# Patient Record
Sex: MALE | Race: WHITE | NOT HISPANIC OR LATINO | Employment: OTHER | URBAN - METROPOLITAN AREA
[De-identification: names, ages, dates, MRNs, and addresses within clinical notes are randomized per-mention and may not be internally consistent; named-entity substitution may affect disease eponyms.]

---

## 2023-03-03 LAB
FIO2: 21 %
PATIENT TEMPERATURE: 37 DEGREES C
POCT BASE EXCESS, ARTERIAL: -6.8 MMOL/L (ref -2–3)
POCT HCO3, ARTERIAL: 17.7 MMOL/L (ref 22–26)
POCT OXY HEMOGLOBIN, ARTERIAL: 92.5 % (ref 94–98)
POCT PCO2, ARTERIAL: 32 MMHG (ref 38–42)
POCT PH, ARTERIAL: 7.35 (ref 7.38–7.42)
POCT PO2, ARTERIAL: 73 MMHG (ref 85–95)
POCT SO2, ARTERIAL: 96 % (ref 94–100)

## 2023-04-04 DIAGNOSIS — I10 HYPERTENSION, UNSPECIFIED TYPE: ICD-10-CM

## 2023-04-04 DIAGNOSIS — E03.9 HYPOTHYROIDISM, UNSPECIFIED TYPE: ICD-10-CM

## 2023-04-04 DIAGNOSIS — E78.5 HYPERLIPIDEMIA, UNSPECIFIED HYPERLIPIDEMIA TYPE: ICD-10-CM

## 2023-04-04 DIAGNOSIS — M47.816 OSTEOARTHRITIS OF LUMBAR SPINE, UNSPECIFIED SPINAL OSTEOARTHRITIS COMPLICATION STATUS: ICD-10-CM

## 2023-04-04 RX ORDER — ROSUVASTATIN CALCIUM 40 MG/1
40 TABLET, COATED ORAL DAILY
COMMUNITY
Start: 2018-01-29 | End: 2023-04-06 | Stop reason: SINTOL

## 2023-04-04 RX ORDER — CELECOXIB 200 MG/1
200 CAPSULE ORAL DAILY
COMMUNITY
Start: 2022-06-07 | End: 2023-04-04 | Stop reason: SDUPTHER

## 2023-04-04 RX ORDER — LEVOTHYROXINE SODIUM 100 UG/1
100 TABLET ORAL DAILY
Qty: 30 TABLET | Refills: 0 | Status: SHIPPED | OUTPATIENT
Start: 2023-04-04 | End: 2023-04-06 | Stop reason: ALTCHOICE

## 2023-04-04 RX ORDER — LEVOTHYROXINE SODIUM 100 UG/1
100 TABLET ORAL DAILY
COMMUNITY
Start: 2014-12-09 | End: 2023-04-04 | Stop reason: SDUPTHER

## 2023-04-04 RX ORDER — PRAVASTATIN SODIUM 40 MG/1
40 TABLET ORAL DAILY
COMMUNITY
Start: 2023-01-12 | End: 2023-04-04 | Stop reason: SDUPTHER

## 2023-04-04 RX ORDER — OLMESARTAN MEDOXOMIL 20 MG/1
20 TABLET ORAL DAILY
COMMUNITY
Start: 2021-12-16 | End: 2023-04-04 | Stop reason: SDUPTHER

## 2023-04-04 RX ORDER — CELECOXIB 200 MG/1
200 CAPSULE ORAL 2 TIMES DAILY
Qty: 60 CAPSULE | Refills: 0 | Status: SHIPPED | OUTPATIENT
Start: 2023-04-04 | End: 2023-04-06 | Stop reason: SDUPTHER

## 2023-04-04 RX ORDER — LISINOPRIL 20 MG/1
20 TABLET ORAL
COMMUNITY
End: 2023-04-06 | Stop reason: ALTCHOICE

## 2023-04-04 RX ORDER — LISINOPRIL 20 MG/1
20 TABLET ORAL DAILY
Qty: 30 TABLET | Refills: 0 | Status: CANCELLED | OUTPATIENT
Start: 2023-04-04 | End: 2023-05-04

## 2023-04-04 RX ORDER — TAMSULOSIN HYDROCHLORIDE 0.4 MG/1
0.4 CAPSULE ORAL DAILY
COMMUNITY
Start: 2021-04-07 | End: 2023-04-06 | Stop reason: ALTCHOICE

## 2023-04-04 RX ORDER — PRAVASTATIN SODIUM 40 MG/1
40 TABLET ORAL DAILY
Qty: 30 TABLET | Refills: 0 | Status: SHIPPED | OUTPATIENT
Start: 2023-04-04 | End: 2023-04-06 | Stop reason: SINTOL

## 2023-04-04 RX ORDER — OLMESARTAN MEDOXOMIL 20 MG/1
20 TABLET ORAL DAILY
Qty: 30 TABLET | Refills: 0 | Status: SHIPPED | OUTPATIENT
Start: 2023-04-04 | End: 2023-04-06 | Stop reason: SDUPTHER

## 2023-04-06 ENCOUNTER — LAB (OUTPATIENT)
Dept: LAB | Facility: LAB | Age: 73
End: 2023-04-06
Payer: MEDICARE

## 2023-04-06 ENCOUNTER — OFFICE VISIT (OUTPATIENT)
Dept: PRIMARY CARE | Facility: CLINIC | Age: 73
End: 2023-04-06
Payer: MEDICARE

## 2023-04-06 VITALS
DIASTOLIC BLOOD PRESSURE: 70 MMHG | WEIGHT: 236 LBS | HEART RATE: 70 BPM | SYSTOLIC BLOOD PRESSURE: 122 MMHG | BODY MASS INDEX: 27.27 KG/M2

## 2023-04-06 DIAGNOSIS — Z00.00 MEDICARE ANNUAL WELLNESS VISIT, SUBSEQUENT: Primary | ICD-10-CM

## 2023-04-06 DIAGNOSIS — R91.1 PULMONARY NODULE: ICD-10-CM

## 2023-04-06 DIAGNOSIS — I10 PRIMARY HYPERTENSION: ICD-10-CM

## 2023-04-06 DIAGNOSIS — E03.9 ACQUIRED HYPOTHYROIDISM: ICD-10-CM

## 2023-04-06 DIAGNOSIS — M15.9 GENERALIZED OSTEOARTHRITIS OF MULTIPLE SITES: ICD-10-CM

## 2023-04-06 DIAGNOSIS — J44.9 CHRONIC OBSTRUCTIVE PULMONARY DISEASE, UNSPECIFIED COPD TYPE (MULTI): ICD-10-CM

## 2023-04-06 DIAGNOSIS — C61 PROSTATE CANCER (MULTI): ICD-10-CM

## 2023-04-06 DIAGNOSIS — M47.816 SPONDYLOSIS OF LUMBAR REGION WITHOUT MYELOPATHY OR RADICULOPATHY: ICD-10-CM

## 2023-04-06 DIAGNOSIS — N18.31 STAGE 3A CHRONIC KIDNEY DISEASE (MULTI): ICD-10-CM

## 2023-04-06 DIAGNOSIS — E78.2 MIXED HYPERLIPIDEMIA: ICD-10-CM

## 2023-04-06 DIAGNOSIS — Z00.00 PHYSICAL EXAM, ANNUAL: ICD-10-CM

## 2023-04-06 PROBLEM — R39.12 WEAK URINARY STREAM: Status: ACTIVE | Noted: 2023-04-06

## 2023-04-06 PROBLEM — R09.02 HYPOXIA: Status: RESOLVED | Noted: 2023-04-06 | Resolved: 2023-04-06

## 2023-04-06 PROBLEM — I65.29 CAROTID ARTERY OCCLUSION: Status: ACTIVE | Noted: 2023-04-06

## 2023-04-06 PROBLEM — M25.642 STIFFNESS OF LEFT HAND JOINT: Status: ACTIVE | Noted: 2023-04-06

## 2023-04-06 PROBLEM — M79.10 MYALGIA: Status: ACTIVE | Noted: 2023-04-06

## 2023-04-06 PROBLEM — R97.20 INCREASED PROSTATE SPECIFIC ANTIGEN (PSA) VELOCITY: Status: RESOLVED | Noted: 2023-04-06 | Resolved: 2023-04-06

## 2023-04-06 PROBLEM — R06.02 SOB (SHORTNESS OF BREATH) ON EXERTION: Status: ACTIVE | Noted: 2023-04-06

## 2023-04-06 PROBLEM — M25.642 STIFFNESS OF LEFT HAND JOINT: Status: RESOLVED | Noted: 2023-04-06 | Resolved: 2023-04-06

## 2023-04-06 PROBLEM — R76.8 ANTI-RNP ANTIBODIES PRESENT: Status: ACTIVE | Noted: 2023-04-06

## 2023-04-06 PROBLEM — M25.641 STIFFNESS OF RIGHT HAND JOINT: Status: ACTIVE | Noted: 2023-04-06

## 2023-04-06 PROBLEM — R09.02 HYPOXIA: Status: ACTIVE | Noted: 2023-04-06

## 2023-04-06 PROBLEM — R76.8 SMITH ANTIBODY POSITIVE: Status: ACTIVE | Noted: 2023-04-06

## 2023-04-06 PROBLEM — I65.23 BILATERAL CAROTID ARTERY STENOSIS: Status: ACTIVE | Noted: 2023-04-06

## 2023-04-06 PROBLEM — K42.9 UMBILICAL HERNIA WITHOUT OBSTRUCTION AND WITHOUT GANGRENE: Status: ACTIVE | Noted: 2023-04-06

## 2023-04-06 PROBLEM — M79.10 MYALGIA: Status: RESOLVED | Noted: 2023-04-06 | Resolved: 2023-04-06

## 2023-04-06 PROBLEM — R79.89 AZOTEMIA: Status: ACTIVE | Noted: 2023-04-06

## 2023-04-06 PROBLEM — R97.20 INCREASED PROSTATE SPECIFIC ANTIGEN (PSA) VELOCITY: Status: ACTIVE | Noted: 2023-04-06

## 2023-04-06 PROBLEM — T46.4X5A ACE-INHIBITOR COUGH: Status: ACTIVE | Noted: 2023-04-06

## 2023-04-06 PROBLEM — R13.19 INTERMITTENT DYSPHAGIA: Status: ACTIVE | Noted: 2023-04-06

## 2023-04-06 PROBLEM — M25.641 STIFFNESS OF RIGHT HAND JOINT: Status: RESOLVED | Noted: 2023-04-06 | Resolved: 2023-04-06

## 2023-04-06 PROBLEM — R05.8 ACE-INHIBITOR COUGH: Status: ACTIVE | Noted: 2023-04-06

## 2023-04-06 PROBLEM — R06.02 SOB (SHORTNESS OF BREATH) ON EXERTION: Status: RESOLVED | Noted: 2023-04-06 | Resolved: 2023-04-06

## 2023-04-06 PROBLEM — R39.12 WEAK URINARY STREAM: Status: RESOLVED | Noted: 2023-04-06 | Resolved: 2023-04-06

## 2023-04-06 PROBLEM — R79.89 AZOTEMIA: Status: RESOLVED | Noted: 2023-04-06 | Resolved: 2023-04-06

## 2023-04-06 PROBLEM — E78.5 HYPERLIPIDEMIA: Status: ACTIVE | Noted: 2023-04-06

## 2023-04-06 LAB — THYROTROPIN (MIU/L) IN SER/PLAS BY DETECTION LIMIT <= 0.05 MIU/L: 0.6 MIU/L (ref 0.44–3.98)

## 2023-04-06 PROCEDURE — 84443 ASSAY THYROID STIM HORMONE: CPT

## 2023-04-06 PROCEDURE — 1160F RVW MEDS BY RX/DR IN RCRD: CPT | Performed by: FAMILY MEDICINE

## 2023-04-06 PROCEDURE — 99397 PER PM REEVAL EST PAT 65+ YR: CPT | Performed by: FAMILY MEDICINE

## 2023-04-06 PROCEDURE — 99214 OFFICE O/P EST MOD 30 MIN: CPT | Performed by: FAMILY MEDICINE

## 2023-04-06 PROCEDURE — 1157F ADVNC CARE PLAN IN RCRD: CPT | Performed by: FAMILY MEDICINE

## 2023-04-06 PROCEDURE — 1036F TOBACCO NON-USER: CPT | Performed by: FAMILY MEDICINE

## 2023-04-06 PROCEDURE — G0439 PPPS, SUBSEQ VISIT: HCPCS | Performed by: FAMILY MEDICINE

## 2023-04-06 PROCEDURE — 3074F SYST BP LT 130 MM HG: CPT | Performed by: FAMILY MEDICINE

## 2023-04-06 PROCEDURE — 1159F MED LIST DOCD IN RCRD: CPT | Performed by: FAMILY MEDICINE

## 2023-04-06 PROCEDURE — 1170F FXNL STATUS ASSESSED: CPT | Performed by: FAMILY MEDICINE

## 2023-04-06 PROCEDURE — 36415 COLL VENOUS BLD VENIPUNCTURE: CPT

## 2023-04-06 PROCEDURE — 3078F DIAST BP <80 MM HG: CPT | Performed by: FAMILY MEDICINE

## 2023-04-06 RX ORDER — CELECOXIB 200 MG/1
200 CAPSULE ORAL 2 TIMES DAILY
Qty: 180 CAPSULE | Refills: 1 | Status: SHIPPED | OUTPATIENT
Start: 2023-04-06 | End: 2023-09-08 | Stop reason: SDUPTHER

## 2023-04-06 RX ORDER — OLMESARTAN MEDOXOMIL 20 MG/1
20 TABLET ORAL DAILY
Qty: 90 TABLET | Refills: 1 | Status: SHIPPED | OUTPATIENT
Start: 2023-04-06 | End: 2023-09-08 | Stop reason: SDUPTHER

## 2023-04-06 RX ORDER — EZETIMIBE 10 MG/1
10 TABLET ORAL DAILY
Qty: 90 TABLET | Refills: 1 | Status: SHIPPED | OUTPATIENT
Start: 2023-04-06 | End: 2024-01-09 | Stop reason: ALTCHOICE

## 2023-04-06 ASSESSMENT — PATIENT HEALTH QUESTIONNAIRE - PHQ9
1. LITTLE INTEREST OR PLEASURE IN DOING THINGS: NOT AT ALL
SUM OF ALL RESPONSES TO PHQ9 QUESTIONS 1 AND 2: 0
2. FEELING DOWN, DEPRESSED OR HOPELESS: NOT AT ALL
1. LITTLE INTEREST OR PLEASURE IN DOING THINGS: NOT AT ALL
SUM OF ALL RESPONSES TO PHQ9 QUESTIONS 1 AND 2: 0
2. FEELING DOWN, DEPRESSED OR HOPELESS: NOT AT ALL

## 2023-04-06 ASSESSMENT — ACTIVITIES OF DAILY LIVING (ADL)
TAKING_MEDICATION: INDEPENDENT
MANAGING_FINANCES: INDEPENDENT
DRESSING: INDEPENDENT
DOING_HOUSEWORK: INDEPENDENT
GROCERY_SHOPPING: INDEPENDENT
BATHING: INDEPENDENT

## 2023-04-06 ASSESSMENT — ENCOUNTER SYMPTOMS
LOSS OF SENSATION IN FEET: 0
OCCASIONAL FEELINGS OF UNSTEADINESS: 0
DEPRESSION: 0

## 2023-04-06 NOTE — ASSESSMENT & PLAN NOTE
Intermediate risk prostate cancer, cT1c cN0 cMx, PSA 3.38 ng/mL, [place] grade group 2 (3+4 = 7), stage IIB  Now almost 2 years status post I-125 brachytherapy seeds  You have follow-up with your oncology forthcoming, please keep that appointment at the end of May  Last PSA check was in November 2022, noted to be 0.75 and there appeared to be a nice decline in the data points since having radiation seeds

## 2023-04-06 NOTE — ASSESSMENT & PLAN NOTE
Last lipid panel was noted to be very nice back in December  163, 42, 90, 152, currently taking pravastatin  We are going to discontinue pravastatin in favor of Zetia 10 mg daily  As we discussed, there is no long-term data for risk reduction with Zetia, but we need to do something to keep your data points as low as possible, especially since you have not been tolerable to the statin family medications  Talked about Repatha, but we both felt it may be cost prohibitive for you

## 2023-04-06 NOTE — ASSESSMENT & PLAN NOTE
CT chest reviewed, ordered by cardiology, reviewed also with pulmonology  Some pulmonary nodules noted along with lymphadenopathy in the hilar and mediastinum area indicative of possible reactive nodes, or even neoplasia, consider sarcoid  Repeat CT chest recommended by radiologist, it looks as though it has been arranged for August 2023 through pulmonology

## 2023-04-06 NOTE — ASSESSMENT & PLAN NOTE
GFR last checked December 2022, noted to be 44 previous 48, 49, 55, 59  It appears as though this has remained stable for quite some time  We will continue to monitor routinely

## 2023-04-06 NOTE — ASSESSMENT & PLAN NOTE
Stable, no changes to medication recommended  I would like to have you monitor and record blood pressures at home   Blood pressure goal should be below 130/80, ideally 120/80  If the blood pressure is too high or too low, we need to consider making adjustments to your antihypertensive therapy

## 2023-04-06 NOTE — PROGRESS NOTES
Subjective   Patient ID: Sae Dan is a 72 y.o. male who presents for Hyperlipidemia, Hypertension, and Hypothyroidism.    HPI  1. Health Maintenance  Overall patient is doing well.   Immunization: Up-to-date except Shingrix  Colon Cancer Screenin, no repeat necessary  Diet:  Exercise:  Tobacco: Former smoker with secondhand smoke exposure  EtOH: Rarely Socially      2. Hypertension   Currently on lisinopril 20 mg and olmesartan 20 mg daily. Tolerating well.   BP today in triage showed ##. Recheck showed ##.   Home blood pressure readings showed systolic around ## and diastolic around ##.   Denies any headaches, dizziness, vision changes, chest pain or pressure, palpitations, SOB, VILLATORO, LE edema, or any other complaints.     3. Hyperlipidemia  Currently on pravastatin 40 mg and ##. Tolerating well. Denies any myalgias.  Previous lipid panel on 2022 showed triglycerides elevated at 152. All others are within normal limits.    4. Hypothyroidism  Currently on levothyroxine 100 mcg daily.  Denies any palpitations, weight changes, confusion, diarrhea/constipation, heat or cold intolerance.    5.. Generalized              Review of Systems  All pertinent positive symptoms are included in the history of present illness.    All other systems have been reviewed and are negative and noncontributory to this patient's current ailments.    No Known Allergies    Immunization History   Administered Date(s) Administered    Influenza, seasonal, injectable 2022    Pfizer Purple Cap SARS-CoV-2 2021, 2021    Pneumococcal, Unspecified 2016, 2018       Objective   Vitals:    23 1303   BP: 122/70   Pulse: 70   Weight: 107 kg (236 lb)     Body mass index is 27.27 kg/m².    Physical Exam  CONSTITUTIONAL - well nourished, well developed, looks like stated age, in no acute distress, not ill-appearing, and not tired appearing  SKIN - normal skin color and pigmentation, normal skin turgor  without rash, lesions, or nodules visualized  HEAD - no trauma, normocephalic  EYES - pupils are equal and reactive to light, extraocular muscles are intact, and normal external exam  ENT - TM's intact, no injection, no signs of infection, uvula midline, normal tongue movement and throat normal, no exudate, nasal passage without discharge and patent  NECK - supple without rigidity, no neck mass was observed, no thyromegaly or thyroid nodules  CHEST - clear to auscultation, no wheezing, no crackles and no rales, good effort  CARDIAC - regular rate and regular rhythm, no skipped beats, no murmur  ABDOMEN - no organomegaly, soft, nontender, nondistended, normal bowel sounds, no guarding/rebound/rigidity, negative McBurney sign and negative Saez sign  RECTAL - prostate is smooth, not enlarged, nontender, no masses or nodules palpable; normal sphincter tone, no rectal masses  EXTREMITIES - no edema, no deformities  NEUROLOGICAL - normal gait, normal balance, normal motor, no ataxia, DTRs equal and symmetrical; alert, oriented and no focal signs  PSYCHIATRIC - alert, pleasant and cordial, age-appropriate  IMMUNOLOGIC - no cervical lymphadenopathy     Assessment/Plan   Problem List Items Addressed This Visit       Hyperlipidemia     Last lipid panel was noted to be very nice back in December  163, 42, 90, 152, currently taking pravastatin         Hypertension - Primary     Stable, no changes to medication recommended  I would like to have you monitor and record blood pressures at home   Blood pressure goal should be below 130/80, ideally 120/80  If the blood pressure is too high or too low, we need to consider making adjustments to your antihypertensive therapy         Hypothyroidism     TSH was 3.47 back in December, you work asymptomatic at the time so no changes to medication were made  You continue to be asymptomatic         Osteoarthritis of lumbar spine     Celebrex has been used to help with arthritic related  pain, we will continue to refill as long as it continues to provide benefit         Prostate cancer (CMS/HCC)     Intermediate risk prostate cancer, cT1c cN0 cMx, PSA 3.38 ng/mL, [place] grade group 2 (3+4 = 7), stage IIB  Now almost 2 years status post I-125 brachytherapy seeds  You have follow-up with your oncology forthcoming, please keep that appointment  Last PSA check was in November 2022, noted to be 0.75 and there appeared to be a nice decline in the data points since having radiation seeds         Stage 3a chronic kidney disease     GFR last checked December 2022, noted to be 44 previous 48, 49, 55, 59  It appears as though this has remained stable for quite some time  We will continue to monitor routinely         COPD (chronic obstructive pulmonary disease) (CMS/[facility])     Recent PFT was normal  Please continue to follow with pulmonology per protocol         Generalized osteoarthritis of multiple sites     Celebrex has been used to help with arthritic related pain, we will continue to refill as long as it continues to provide benefit         Pulmonary nodule     CT chest reviewed, ordered by cardiology, reviewed also with pulmonology  Some pulmonary nodules noted along with lymphadenopathy in the hilar and mediastinum area indicative of possible reactive nodes, or even neoplasia, consider sarcoid  Repeat CT chest recommended by radiologist, it looks as though it has been arranged for August 2023 through pulmonology

## 2023-04-06 NOTE — ASSESSMENT & PLAN NOTE
Celebrex has been used to help with arthritic related pain, we will continue to refill as long as it continues to provide benefit

## 2023-04-06 NOTE — ASSESSMENT & PLAN NOTE
TSH was 3.47 back in December, you were asymptomatic at the time so no changes to medication were made  However, now you continue to have problems with weight gain which could be a symptom of your postoperative hypothyroidism

## 2023-04-06 NOTE — PROGRESS NOTES
Subjective   Patient ID: Sae Dan is a 72 y.o. male who presents for Hyperlipidemia, Hypertension, Hypothyroidism, and Medicare Annual Wellness Visit Subsequent.    HPI  1. Medicare annual wellness visit  Overall patient is doing well.   Immunization: Up to date.  COVID-19 vaccine Pfizer x2.  Colon Cancer Screening: No family history; No longer required to continue.  Diet: reportedly well balanced.  Exercise: poor during the winter; planing to restart his gym membership  Tobacco: Denies use  EtOH: Denies use      2. Acquired Hypothyroidism  History of Grave's Disease s/p thyroidectomy   Currently on levothyroxine 112 mcg daily  TSH was 3.47 on December 2022 labs  He reports that prior to the surgery his weight was approximately 185--he is now 236.  Denies any palpitations, confusion, diarrhea/constipation, heat or cold intolerance     3. Primary hypertension   Currently on olmesartan 20 mg daily. Tolerating well.  BP today in triage showed 122/70.   Denies any headaches, dizziness, vision changes, chest pain or pressure, palpitations, SOB, VILLATORO, LE edema, or any other complaints.     4. Mixed hyperlipidemia   Currently on pravastatin 40 mg. Reports significant muscle cramps.  He was previously on rosuvastatin 40 mg, and reported the same sensation.  Previous lipid panel in December 2022 showed triglycerides elevated at 152. All other are within normal limits.  Not willing to continue the medication, would like to have something different.  He has not spoken to his cardiologist about this.    5. Spondylosis/Low back pain/generalized osteoarthritis   History of chronic low back pain  Denies any urinary incontinence, paresthesias, or muscle weakness  Currently on celecoxib 200 mg 1-2 times daily as needed for low back pain  Reports significant relief of pain in his hands, with some relief of his back pain    6. Pulmonary Nodule/COPD  Currently following with his pulmonologist  Reports that he has a follow up CT  scan in 4 months  Recent pulmonary function test was reportedly normal    7. CKD stage 3b  GFR on 12/21/2022 was 44 from 44 six months prior. Appears stable.  Baseline creatinine appears to be 1.5-1.6.  Denies any fatigue, weakness, nausea, vomiting, loss of appetite, or pruritis  Patient reports that he does not want to follow this issue with nephrology at this time.    Review of Systems  All pertinent positive symptoms are included in the history of present illness.    All other systems have been reviewed and are negative and noncontributory to this patient's current ailments.    No Known Allergies    Immunization History   Administered Date(s) Administered    Influenza, seasonal, injectable 12/21/2022    Pfizer Purple Cap SARS-CoV-2 03/08/2021, 04/05/2021    Pneumococcal, Unspecified 12/21/2016, 08/08/2018       Objective   Vitals:    04/06/23 1303   BP: 122/70   Pulse: 70   Weight: 107 kg (236 lb)     Body mass index is 27.27 kg/m².    Physical Exam  CONSTITUTIONAL - well nourished, well developed, looks like stated age, in no acute distress, not ill-appearing, and not tired appearing  SKIN - normal skin color and pigmentation, normal skin turgor without rash, lesions, or nodules visualized  HEAD - no trauma, normocephalic  EYES - extraocular muscles are intact, and normal external exam  NECK - supple without rigidity, no neck mass was observed, no thyromegaly or thyroid nodules  CHEST - clear to auscultation, no wheezing, no crackles and no rales, good effort  CARDIAC - regular rate and regular rhythm, no skipped beats, no murmur  ABDOMEN - no organomegaly, soft, nontender, nondistended, normal bowel sounds, no guarding/rebound/rigidity  EXTREMITIES - no edema, no deformities  NEUROLOGICAL - normal gait, normal balance, normal motor, no ataxia; alert, oriented and no focal signs  PSYCHIATRIC - alert, pleasant and cordial, age-appropriate  IMMUNOLOGIC - no cervical lymphadenopathy     Assessment/Plan   Problem  List Items Addressed This Visit       Hyperlipidemia     Last lipid panel was noted to be very nice back in December  163, 42, 90, 152, currently taking pravastatin  We are going to discontinue pravastatin in favor of Zetia 10 mg daily  As we discussed, there is no long-term data for risk reduction with Zetia, but we need to do something to keep your data points as low as possible, especially since you have not been tolerable to the statin family medications  Talked about Repatha, but we both felt it may be cost prohibitive for you         Hypertension     Stable, no changes to medication recommended  I would like to have you monitor and record blood pressures at home   Blood pressure goal should be below 130/80, ideally 120/80  If the blood pressure is too high or too low, we need to consider making adjustments to your antihypertensive therapy         Hypothyroidism     TSH was 3.47 back in December, you were asymptomatic at the time so no changes to medication were made  However, now you continue to have problems with weight gain which could be a symptom of your postoperative hypothyroidism         Relevant Orders    Tsh With Reflex To Free T4 If Abnormal    Osteoarthritis of lumbar spine     Celebrex has been used to help with arthritic related pain, we will continue to refill as long as it continues to provide benefit         Prostate cancer (CMS/[facility])     Intermediate risk prostate cancer, cT1c cN0 cMx, PSA 3.38 ng/mL, [place] grade group 2 (3+4 = 7), stage IIB  Now almost 2 years status post I-125 brachytherapy seeds  You have follow-up with your oncology forthcoming, please keep that appointment at the end of May  Last PSA check was in November 2022, noted to be 0.75 and there appeared to be a nice decline in the data points since having radiation seeds         Stage 3a chronic kidney disease     GFR last checked December 2022, noted to be 44 previous 48, 49, 55, 59  It appears as though this has remained  stable for quite some time  We will continue to monitor routinely         COPD (chronic obstructive pulmonary disease) (CMS/HCC)     Recent PFT was normal  Please continue to follow with pulmonology per protocol         Generalized osteoarthritis of multiple sites     Celebrex has been used to help with arthritic related pain, we will continue to refill as long as it continues to provide benefit         Pulmonary nodule     CT chest reviewed, ordered by cardiology, reviewed also with pulmonology  Some pulmonary nodules noted along with lymphadenopathy in the hilar and mediastinum area indicative of possible reactive nodes, or even neoplasia, consider sarcoid  Repeat CT chest recommended by radiologist, it looks as though it has been arranged for August 2023 through pulmonology         Medicare annual wellness visit, subsequent - Primary     Colonoscopy no longer needed  Immunizations up-to-date

## 2023-04-07 DIAGNOSIS — E03.9 HYPOTHYROIDISM, UNSPECIFIED TYPE: ICD-10-CM

## 2023-04-07 RX ORDER — LEVOTHYROXINE SODIUM 112 UG/1
112 TABLET ORAL DAILY
Qty: 30 TABLET | Refills: 0 | OUTPATIENT
Start: 2023-04-07 | End: 2023-05-07

## 2023-04-09 DIAGNOSIS — E03.9 ACQUIRED HYPOTHYROIDISM: Primary | ICD-10-CM

## 2023-04-09 RX ORDER — LEVOTHYROXINE SODIUM 112 UG/1
112 TABLET ORAL DAILY
Qty: 90 TABLET | Refills: 1 | Status: SHIPPED | OUTPATIENT
Start: 2023-04-09 | End: 2023-09-08 | Stop reason: SDUPTHER

## 2023-04-09 NOTE — RESULT ENCOUNTER NOTE
Thyroid looks excellent with a TSH of 0.60 which is well controlled so no changes to medication are recommended.  This is clearly not the reason why there has been some weight gain.  I will refill levothyroxine 112 mcg daily to the pharmacy on file

## 2023-05-15 DIAGNOSIS — E03.9 ACQUIRED HYPOTHYROIDISM: ICD-10-CM

## 2023-05-15 RX ORDER — LEVOTHYROXINE SODIUM 112 UG/1
112 TABLET ORAL DAILY
Qty: 30 TABLET | Refills: 0 | OUTPATIENT
Start: 2023-05-15 | End: 2023-11-11

## 2023-05-18 DIAGNOSIS — E78.2 MIXED HYPERLIPIDEMIA: Primary | ICD-10-CM

## 2023-05-20 RX ORDER — PRAVASTATIN SODIUM 40 MG/1
TABLET ORAL
Qty: 90 TABLET | Refills: 0 | Status: SHIPPED | OUTPATIENT
Start: 2023-05-20 | End: 2023-09-07 | Stop reason: ALTCHOICE

## 2023-09-07 ENCOUNTER — LAB (OUTPATIENT)
Dept: LAB | Facility: LAB | Age: 73
End: 2023-09-07
Payer: MEDICARE

## 2023-09-07 ENCOUNTER — OFFICE VISIT (OUTPATIENT)
Dept: PRIMARY CARE | Facility: CLINIC | Age: 73
End: 2023-09-07
Payer: MEDICARE

## 2023-09-07 VITALS
SYSTOLIC BLOOD PRESSURE: 150 MMHG | WEIGHT: 227 LBS | HEART RATE: 70 BPM | BODY MASS INDEX: 33.52 KG/M2 | DIASTOLIC BLOOD PRESSURE: 90 MMHG

## 2023-09-07 DIAGNOSIS — N18.31 STAGE 3A CHRONIC KIDNEY DISEASE (MULTI): ICD-10-CM

## 2023-09-07 DIAGNOSIS — D72.829 LEUKOCYTOSIS, UNSPECIFIED TYPE: ICD-10-CM

## 2023-09-07 DIAGNOSIS — E78.2 MIXED HYPERLIPIDEMIA: ICD-10-CM

## 2023-09-07 DIAGNOSIS — I10 PRIMARY HYPERTENSION: Primary | ICD-10-CM

## 2023-09-07 DIAGNOSIS — E89.0 POSTOPERATIVE HYPOTHYROIDISM: ICD-10-CM

## 2023-09-07 DIAGNOSIS — I10 PRIMARY HYPERTENSION: ICD-10-CM

## 2023-09-07 DIAGNOSIS — M47.816 SPONDYLOSIS OF LUMBAR REGION WITHOUT MYELOPATHY OR RADICULOPATHY: ICD-10-CM

## 2023-09-07 DIAGNOSIS — E03.9 ACQUIRED HYPOTHYROIDISM: ICD-10-CM

## 2023-09-07 DIAGNOSIS — Z90.49 HISTORY OF APPENDECTOMY: ICD-10-CM

## 2023-09-07 PROBLEM — R94.31 ABNORMAL ELECTROCARDIOGRAM (ECG) (EKG): Status: ACTIVE | Noted: 2023-09-07

## 2023-09-07 PROBLEM — I63.9 CEREBRAL INFARCTION, UNSPECIFIED (MULTI): Status: ACTIVE | Noted: 2023-09-07

## 2023-09-07 LAB
ALANINE AMINOTRANSFERASE (SGPT) (U/L) IN SER/PLAS: 18 U/L (ref 10–52)
ALBUMIN (G/DL) IN SER/PLAS: 4 G/DL (ref 3.4–5)
ALKALINE PHOSPHATASE (U/L) IN SER/PLAS: 76 U/L (ref 33–136)
ANION GAP IN SER/PLAS: 13 MMOL/L (ref 10–20)
ASPARTATE AMINOTRANSFERASE (SGOT) (U/L) IN SER/PLAS: 18 U/L (ref 9–39)
BASOPHILS (10*3/UL) IN BLOOD BY AUTOMATED COUNT: 0.06 X10E9/L (ref 0–0.1)
BASOPHILS/100 LEUKOCYTES IN BLOOD BY AUTOMATED COUNT: 1 % (ref 0–2)
BILIRUBIN TOTAL (MG/DL) IN SER/PLAS: 0.5 MG/DL (ref 0–1.2)
CALCIUM (MG/DL) IN SER/PLAS: 8.9 MG/DL (ref 8.6–10.3)
CARBON DIOXIDE, TOTAL (MMOL/L) IN SER/PLAS: 23 MMOL/L (ref 21–32)
CHLORIDE (MMOL/L) IN SER/PLAS: 108 MMOL/L (ref 98–107)
CHOLESTEROL (MG/DL) IN SER/PLAS: 234 MG/DL (ref 0–199)
CHOLESTEROL IN HDL (MG/DL) IN SER/PLAS: 41.1 MG/DL
CHOLESTEROL/HDL RATIO: 5.7
CREATININE (MG/DL) IN SER/PLAS: 1.24 MG/DL (ref 0.5–1.3)
EOSINOPHILS (10*3/UL) IN BLOOD BY AUTOMATED COUNT: 0.2 X10E9/L (ref 0–0.4)
EOSINOPHILS/100 LEUKOCYTES IN BLOOD BY AUTOMATED COUNT: 3.4 % (ref 0–6)
ERYTHROCYTE DISTRIBUTION WIDTH (RATIO) BY AUTOMATED COUNT: 14 % (ref 11.5–14.5)
ERYTHROCYTE MEAN CORPUSCULAR HEMOGLOBIN CONCENTRATION (G/DL) BY AUTOMATED: 31.8 G/DL (ref 32–36)
ERYTHROCYTE MEAN CORPUSCULAR VOLUME (FL) BY AUTOMATED COUNT: 92 FL (ref 80–100)
ERYTHROCYTES (10*6/UL) IN BLOOD BY AUTOMATED COUNT: 4.35 X10E12/L (ref 4.5–5.9)
GFR MALE: 61 ML/MIN/1.73M2
GLUCOSE (MG/DL) IN SER/PLAS: 94 MG/DL (ref 74–99)
HEMATOCRIT (%) IN BLOOD BY AUTOMATED COUNT: 40 % (ref 41–52)
HEMOGLOBIN (G/DL) IN BLOOD: 12.7 G/DL (ref 13.5–17.5)
IMMATURE GRANULOCYTES/100 LEUKOCYTES IN BLOOD BY AUTOMATED COUNT: 0.2 % (ref 0–0.9)
LDL: 162 MG/DL (ref 0–99)
LEUKOCYTES (10*3/UL) IN BLOOD BY AUTOMATED COUNT: 6 X10E9/L (ref 4.4–11.3)
LYMPHOCYTES (10*3/UL) IN BLOOD BY AUTOMATED COUNT: 1.43 X10E9/L (ref 0.8–3)
LYMPHOCYTES/100 LEUKOCYTES IN BLOOD BY AUTOMATED COUNT: 24 % (ref 13–44)
MONOCYTES (10*3/UL) IN BLOOD BY AUTOMATED COUNT: 0.71 X10E9/L (ref 0.05–0.8)
MONOCYTES/100 LEUKOCYTES IN BLOOD BY AUTOMATED COUNT: 11.9 % (ref 2–10)
NEUTROPHILS (10*3/UL) IN BLOOD BY AUTOMATED COUNT: 3.55 X10E9/L (ref 1.6–5.5)
NEUTROPHILS/100 LEUKOCYTES IN BLOOD BY AUTOMATED COUNT: 59.5 % (ref 40–80)
PLATELETS (10*3/UL) IN BLOOD AUTOMATED COUNT: 131 X10E9/L (ref 150–450)
POTASSIUM (MMOL/L) IN SER/PLAS: 4.5 MMOL/L (ref 3.5–5.3)
PROTEIN TOTAL: 7.2 G/DL (ref 6.4–8.2)
SODIUM (MMOL/L) IN SER/PLAS: 139 MMOL/L (ref 136–145)
THYROTROPIN (MIU/L) IN SER/PLAS BY DETECTION LIMIT <= 0.05 MIU/L: 1.15 MIU/L (ref 0.44–3.98)
TRIGLYCERIDE (MG/DL) IN SER/PLAS: 155 MG/DL (ref 0–149)
UREA NITROGEN (MG/DL) IN SER/PLAS: 22 MG/DL (ref 6–23)
VLDL: 31 MG/DL (ref 0–40)

## 2023-09-07 PROCEDURE — 3080F DIAST BP >= 90 MM HG: CPT | Performed by: FAMILY MEDICINE

## 2023-09-07 PROCEDURE — 84443 ASSAY THYROID STIM HORMONE: CPT

## 2023-09-07 PROCEDURE — 80053 COMPREHEN METABOLIC PANEL: CPT

## 2023-09-07 PROCEDURE — 36415 COLL VENOUS BLD VENIPUNCTURE: CPT

## 2023-09-07 PROCEDURE — 1036F TOBACCO NON-USER: CPT | Performed by: FAMILY MEDICINE

## 2023-09-07 PROCEDURE — 80061 LIPID PANEL: CPT

## 2023-09-07 PROCEDURE — 99214 OFFICE O/P EST MOD 30 MIN: CPT | Performed by: FAMILY MEDICINE

## 2023-09-07 PROCEDURE — 1157F ADVNC CARE PLAN IN RCRD: CPT | Performed by: FAMILY MEDICINE

## 2023-09-07 PROCEDURE — 85025 COMPLETE CBC W/AUTO DIFF WBC: CPT

## 2023-09-07 PROCEDURE — 3077F SYST BP >= 140 MM HG: CPT | Performed by: FAMILY MEDICINE

## 2023-09-07 PROCEDURE — 1160F RVW MEDS BY RX/DR IN RCRD: CPT | Performed by: FAMILY MEDICINE

## 2023-09-07 PROCEDURE — 1126F AMNT PAIN NOTED NONE PRSNT: CPT | Performed by: FAMILY MEDICINE

## 2023-09-07 PROCEDURE — 1159F MED LIST DOCD IN RCRD: CPT | Performed by: FAMILY MEDICINE

## 2023-09-07 PROCEDURE — 3008F BODY MASS INDEX DOCD: CPT | Performed by: FAMILY MEDICINE

## 2023-09-07 ASSESSMENT — PATIENT HEALTH QUESTIONNAIRE - PHQ9
1. LITTLE INTEREST OR PLEASURE IN DOING THINGS: NOT AT ALL
SUM OF ALL RESPONSES TO PHQ9 QUESTIONS 1 AND 2: 0
2. FEELING DOWN, DEPRESSED OR HOPELESS: NOT AT ALL

## 2023-09-07 NOTE — ASSESSMENT & PLAN NOTE
Continue on Zetia given your previous side effects with statins  As we discussed, there is no long-term data for risk reduction with Zetia, but we need to do something to keep your data points as low as possible, especially since you have not been tolerable to the statin family medications

## 2023-09-07 NOTE — PROGRESS NOTES
Subjective   Patient ID: Sae Dan is a 72 y.o. male who presents for Hyperlipidemia, Hypertension, and Hypothyroidism.    HPI  1.  Status post appendectomy/hospital discharge.  Went to the ED on August 4 for RLQ pain  Turns out appendicitis was diagnosed, so underwent appendectomy  Hospitalized until August 5  Pathology revealed acute appendicitis with perforation and acute serositis  No residual concerns or complaints, feeling fantastic today    2.  Hypothyroidism.  History of Grave's Disease s/p thyroidectomy   Currently on levothyroxine 112 mcg daily  TSH was 0.60 on April 2023 labs  He reports that prior to the surgery his weight was approximately 185--he is now 227.  Notes that after his appendectomy, he lost 12 pounds, tried to keep weight off but is gaining some back  Has joined a gym and is hoping to remain committed to weight loss journey  Denies any palpitations, confusion, diarrhea/constipation, heat or cold intolerance      3.  Hypertension.  Currently on olmesartan 20 mg daily. Tolerating well.  BP today in triage showed 150/90, was 142/81 on retake  States he forgot his medication this morning  Denies any headaches, dizziness, vision changes, chest pain or pressure, palpitations, SOB, VILLATORO, LE edema, or any other complaints.      4.  Hyperlipidemia.  Currently on Zetia 10 mg  He was previously on pravastatin 40 mg, and reported muscle cramping  Previous lipid panel in December 2022 showed triglycerides elevated at 152. All other are within normal limits.     5.  Spondylosis/Low back pain/generalized osteoarthritis   History of chronic low back pain  Denies any urinary incontinence, paresthesias, or muscle weakness  Currently on celecoxib 200 mg 1-2 times daily as needed for low back pain  Reports significant relief of pain in his hands, with some relief of his back pain     Review of Systems  All pertinent positive symptoms are included in the history of present illness.    All other systems  have been reviewed and are negative and noncontributory to this patient's current ailments.    Allergies   Allergen Reactions    Morphine Rash       Immunization History   Administered Date(s) Administered    Influenza, seasonal, injectable 12/21/2022    Pfizer Purple Cap SARS-CoV-2 03/08/2021, 04/05/2021    Pneumococcal, Unspecified 12/21/2016, 08/08/2018       Objective   Visit Vitals  /90   Pulse 70   Wt 103 kg (227 lb)   BMI 33.52 kg/m²   Smoking Status Former   BSA 2.24 m²       Physical Exam  CONSTITUTIONAL - well nourished, well developed, looks like stated age, in no acute distress, not ill-appearing, and not tired appearing  SKIN - normal skin color and pigmentation, normal skin turgor without rash, lesions, or nodules visualized  HEAD - no trauma, normocephalic  EYES - normal external exam  CHEST - clear to auscultation, no wheezing, no crackles and no rales, good effort  CARDIAC - regular rate and regular rhythm, no skipped beats, no murmur  ABDOMEN - 3 small incision sites were noted where the appendectomy was done, all well-healed, no signs of infection, there is a small scab on the central lesion just superior to the umbilicus; soft, nontender, no distention  EXTREMITIES - no edema, no deformities  NEUROLOGICAL - normal balance, normal motor, no ataxia  PSYCHIATRIC - alert, pleasant and cordial, age-appropriate     Assessment/Plan   Problem List Items Addressed This Visit       Hyperlipidemia     Continue on Zetia given your previous side effects with statins  As we discussed, there is no long-term data for risk reduction with Zetia, but we need to do something to keep your data points as low as possible, especially since you have not been tolerable to the statin family medications          Relevant Orders    Comprehensive Metabolic Panel    Lipid Panel    Hypertension - Primary     Stable, no changes to medication recommended  I would like to have you monitor and record blood pressures at home    Blood pressure goal should be below 130/80, ideally 120/80  If the blood pressure is too high or too low, we need to consider making adjustments to your antihypertensive therapy         Relevant Orders    Comprehensive Metabolic Panel    Hypothyroidism     Continue on current management plan  We will obtain TSH to ensure euthyroid state         Relevant Orders    TSH with reflex to Free T4 if abnormal    Osteoarthritis of lumbar spine     Celebrex has been used to help with arthritic related pain, we will continue to refill as long as it continues to provide benefit          Stage 3a chronic kidney disease (CMS/HCC)     We will continue to monitor, ordered CMP with blood work today         Relevant Orders    Comprehensive Metabolic Panel    History of appendectomy     I am glad that everything went well with you surgery and you are recovering         Leukocytosis     WBC was elevated at your hospital visit, likely because of your appendicitis  I am going to repeat it with blood work today to ensure it has stabilized         Relevant Orders    CBC and Auto Differential     Current Outpatient Medications   Medication Instructions    celecoxib (CELEBREX) 200 mg, oral, 2 times daily    ezetimibe (ZETIA) 10 mg, oral, Daily    levothyroxine (SYNTHROID, LEVOXYL) 112 mcg, oral, Daily    olmesartan (BENICAR) 20 mg, oral, Daily

## 2023-09-07 NOTE — ASSESSMENT & PLAN NOTE
WBC was elevated at your hospital visit, likely because of your appendicitis  I am going to repeat it with blood work today to ensure it has stabilized

## 2023-09-08 DIAGNOSIS — E03.9 ACQUIRED HYPOTHYROIDISM: ICD-10-CM

## 2023-09-08 DIAGNOSIS — I10 PRIMARY HYPERTENSION: ICD-10-CM

## 2023-09-08 DIAGNOSIS — M15.9 GENERALIZED OSTEOARTHRITIS OF MULTIPLE SITES: ICD-10-CM

## 2023-09-08 DIAGNOSIS — E78.2 MIXED HYPERLIPIDEMIA: ICD-10-CM

## 2023-09-08 RX ORDER — CELECOXIB 200 MG/1
200 CAPSULE ORAL 2 TIMES DAILY
Qty: 180 CAPSULE | Refills: 1 | Status: SHIPPED | OUTPATIENT
Start: 2023-09-08 | End: 2024-03-06

## 2023-09-08 RX ORDER — LEVOTHYROXINE SODIUM 112 UG/1
112 TABLET ORAL DAILY
Qty: 90 TABLET | Refills: 1 | Status: SHIPPED | OUTPATIENT
Start: 2023-09-08 | End: 2024-04-28 | Stop reason: SDUPTHER

## 2023-09-08 RX ORDER — OLMESARTAN MEDOXOMIL 20 MG/1
20 TABLET ORAL DAILY
Qty: 90 TABLET | Refills: 1 | Status: SHIPPED | OUTPATIENT
Start: 2023-09-08 | End: 2024-03-07 | Stop reason: SDUPTHER

## 2023-09-08 NOTE — RESULT ENCOUNTER NOTE
Sugar, kidney, liver, electrolytes, thyroid normal    Cholesterol markedly elevated at 234 with  which is easily the highest you have had on file in at least 3 years.  Does not look like the Zetia that you are using in place of the pravastatin is benefiting you at all.  Pravastatin is the least toxic in regard to side effect profile of all the other statin therapies.  I looked at your past records and you have tried atorvastatin, rosuvastatin, and simvastatin all of which were discontinued because of side effects.  The only other thing I can think about doing is having you seen by a cardiologist who can try to get Repatha approved for you which is an injection every 2 weeks that dramatically lowers the cholesterol and has very minimal side effects.    Complete blood cell count has improved since your hospital admission, still showing some slight anemia, and platelets are down a bit, which I suspect may be residual from your appendectomy.  You may want to consider taking an OTC daily iron 65 mg tablet

## 2023-09-11 RX ORDER — PRAVASTATIN SODIUM 40 MG/1
40 TABLET ORAL DAILY
Qty: 90 TABLET | Refills: 1 | Status: SHIPPED | OUTPATIENT
Start: 2023-09-11 | End: 2024-03-07 | Stop reason: SDUPTHER

## 2023-10-02 ENCOUNTER — APPOINTMENT (OUTPATIENT)
Dept: PULMONOLOGY | Facility: CLINIC | Age: 73
End: 2023-10-02
Payer: MEDICARE

## 2023-10-16 ENCOUNTER — HOSPITAL ENCOUNTER (OUTPATIENT)
Dept: RADIOLOGY | Facility: HOSPITAL | Age: 73
Discharge: HOME | End: 2023-10-16
Payer: MEDICARE

## 2023-10-16 DIAGNOSIS — R91.1 SOLITARY PULMONARY NODULE: ICD-10-CM

## 2023-10-16 DIAGNOSIS — C61 MALIGNANT NEOPLASM OF PROSTATE (MULTI): ICD-10-CM

## 2023-10-16 DIAGNOSIS — R06.02 SHORTNESS OF BREATH: ICD-10-CM

## 2023-10-16 PROCEDURE — 71250 CT THORAX DX C-: CPT

## 2023-10-16 PROCEDURE — 71250 CT THORAX DX C-: CPT | Performed by: RADIOLOGY

## 2023-11-29 ENCOUNTER — LAB (OUTPATIENT)
Dept: LAB | Facility: LAB | Age: 73
End: 2023-11-29
Payer: MEDICARE

## 2023-11-29 ENCOUNTER — TELEPHONE (OUTPATIENT)
Dept: RADIATION ONCOLOGY | Facility: HOSPITAL | Age: 73
End: 2023-11-29

## 2023-11-29 DIAGNOSIS — C61 MALIGNANT NEOPLASM OF PROSTATE (MULTI): Primary | ICD-10-CM

## 2023-11-29 DIAGNOSIS — C61 MALIGNANT NEOPLASM OF PROSTATE (MULTI): ICD-10-CM

## 2023-11-29 LAB — PSA SERPL-MCNC: 0.19 NG/ML

## 2023-11-29 PROCEDURE — 84153 ASSAY OF PSA TOTAL: CPT

## 2023-11-29 PROCEDURE — 36415 COLL VENOUS BLD VENIPUNCTURE: CPT

## 2023-11-30 NOTE — PROGRESS NOTES
Cancer synopsis:  Rad/onc:  Dr. Vázquez/ Mimi HUA    73 year-old male with favorable intermediate risk prostate cancer, cT1c cN0 cMx, PSA 3.38 ng/mL, [place] grade group 2 (3+4 = 7), stage IIB.  The patient has a remote history of Graves' disease treated with retro-orbital radiation 2000 cGy in 10 fractions completed in 2014.  The patient had elevated PSA and MRI of the prostate done shows a 1.2 cm focus in the posterior medial left peripheral zone of the apex consistent with a PI-RADS four lesion with abutment of the capsule, no notable  extraprostatic extension, seminal vesicle invasion or pelvic lymphadenopathy.  The patient underwent transrectal ultrasound-guided biopsy of the prostate  which shows [place] grade group 2 (3+4 = 7) prostate adenocarcinoma with  intraductal carcinoma identified.  Pretreatment MALACHI score 11. IPSS 3.  Quality  of life 1 (pleased).     The radiation planning and treatment procedures were explained to the patient  in advance, and all questions were answered. The benefits and goals of  treatment, options and alternatives, limitations, side effects and risks of  radiation were also explained. The patient provided informed consent.     Technical Summary : Region(s) Treated: Prostate  Radiation Dose Prescribed:  144 Gy, minimal peripheral dose.  Radiation Technique/Machine Used: LDR prostate brachytherapy implant using  I-125. Additional radiation technical details available in our radiation  oncology EMR [employer] [facility] and our treatment planning system.     Treatment Area #1  Location : Prostate  Dates : 5/24/2021  Number of Treatments : 1  Dose : 52728 cGy  Modality : I-125 LDR     History of presenting illness:    Patient ID: [id number]     Sae Dna is a 73 y.o. male who presents for his FIR prostate cancer GS 3+4=7, IPSA <10 now s/p RT.    RT Site: Prostate  RT Date: 05/24/2021 ([facility])  Hormone therapy: No  Hot Flushes: Denies  Fatigue: Denies  Bone pain: Denies  MALACHI: n/a virtual  ED:  Does report erectile loss with aging and RT hwoever not concerned at this time per patient  ED medications: No  IPSS: n/a virtual  Urinary symptoms: Denies hematuria, dysuria. Admits to mild weak stream however resolved as day progresses and is manageable per patient. Denies incomplete bladder emptying, urine leakage or urgency.  Urinary Medications: No  Rectal bleeding: Denies  Other systems: Denies SOB, CP or fever.      Review of systems:  Review of Systems   Constitutional:  Negative for fatigue, fever and unexpected weight change.   Respiratory:  Negative for cough, chest tightness and shortness of breath.    Cardiovascular:  Negative for chest pain, palpitations and leg swelling.   Gastrointestinal:  Negative for abdominal pain, anal bleeding, blood in stool, constipation, diarrhea and rectal pain.   Endocrine: Negative for cold intolerance, heat intolerance and polyuria.   Genitourinary:  Negative for decreased urine volume, difficulty urinating, dysuria, frequency, hematuria and urgency.   Musculoskeletal:  Negative for arthralgias, back pain, gait problem and joint swelling.   Skin: Negative.    Allergic/Immunologic: Negative.    Neurological:  Negative for dizziness, syncope and weakness.   Psychiatric/Behavioral: Negative.         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    Personal history of other diseases of the circulatory system 10/04/2013    History of  arteriosclerotic cardiovascular disease    Personal history of other endocrine, nutritional and metabolic disease     History of Graves' disease    Personal history of other endocrine, nutritional and metabolic disease     History of thyroid disease    Shortness of breath 11/26/2018    Shortness of breath on exertion    Unspecified disorder of refraction 05/15/2014    Refractive error    Unspecified ptosis of left eyelid 05/29/2014    Ptosis of left eyelid    Unspecified ptosis of right eyelid 05/29/2014    Ptosis of right eyelid        Surgical/family history  Family History   Problem Relation Name Age of Onset    Cancer Mother      Cataracts Father      Cancer Father      Glaucoma Other grandmother       Past Surgical History:   Procedure Laterality Date    CT ANGIO HEART CORONARY  11/8/2018    CT HEART CORONARY ANGIOGRAM 11/8/2018 [facility]    MR HEAD ANGIO WO IV CONTRAST  11/17/2012    MR HEAD ANGIO WO IV CONTRAST 11[facility]17/2012 [facility]    MR NECK ANGIO WO IV CONTRAST  11/17/2012    MR NECK ANGIO WO IV CONTRAST 11/17/2012 [facility]    OTHER SURGICAL HISTORY  08/09/2018    Endarterectomy Carotid Artery    TOTAL THYROIDECTOMY  01/09/2015    Thyroid Surgery Total Thyroidectomy        Social History  Tobacco Use: Medium Risk (9/7/2023)    Patient History     Smoking Tobacco Use: Former     Smokeless Tobacco Use: Never     Passive Exposure: Past         Current med list:  Current Outpatient Medications   Medication Instructions    celecoxib (CELEBREX) 200 mg, oral, 2 times daily    ezetimibe (ZETIA) 10 mg, oral, Daily    levothyroxine (SYNTHROID, LEVOXYL) 112 mcg, oral, Daily    olmesartan (BENICAR) 20 mg, oral, Daily    pravastatin (PRAVACHOL) 40 mg, oral, Daily, as directed        Last recorded vital:  N/a virtual    Physical exam  N/a virtual    Pertinent labs:  Prostate Specific AG   Date/Time Value Ref Range Status   11/29/2023 10:55 AM 0.19 <=4.00 ng/mL Final         Dx:  Problem  List Items Addressed This Visit    None  Visit Diagnoses       Malignant neoplasm of prostate (CMS/HCC)    -  Primary    Relevant Orders    Clinic Appointment Request Follow Up; [facility] (virtual); [facility]  [facility] (virtual)         PSA of 0.19 was reviewed and is still declining nicely. Review of latent SE including rectal bleeding, hematuria, urinary strictures, ED where reviewed as well as how to contact office if s/s present. Does report erectile loss with aging and RT however not concerned at this time per patient. Denies latent SE. NCCN guidelines where reviewed and routine FUV of every 3m for first year and every 6m for four years for a total of five years was discussed. Patient verbalized understanding.          PLAN:  FUV 6m virtually  Labs PSA in 6m  Imaging none  FUV other providers: PCP for routine evals        Please contact office with any concerns:  Chon Perez CNP  [phone]

## 2023-12-01 ENCOUNTER — HOSPITAL ENCOUNTER (OUTPATIENT)
Dept: RADIATION ONCOLOGY | Facility: HOSPITAL | Age: 73
Setting detail: RADIATION/ONCOLOGY SERIES
Discharge: HOME | End: 2023-12-01
Payer: MEDICARE

## 2023-12-01 DIAGNOSIS — C61 MALIGNANT NEOPLASM OF PROSTATE (MULTI): Primary | ICD-10-CM

## 2023-12-01 PROCEDURE — 99213 OFFICE O/P EST LOW 20 MIN: CPT

## 2023-12-01 ASSESSMENT — ENCOUNTER SYMPTOMS
HEMATURIA: 0
RECTAL PAIN: 0
WEAKNESS: 0
ALLERGIC/IMMUNOLOGIC NEGATIVE: 1
UNEXPECTED WEIGHT CHANGE: 0
BLOOD IN STOOL: 0
DYSURIA: 0
COUGH: 0
BACK PAIN: 0
FATIGUE: 0
CONSTIPATION: 0
SHORTNESS OF BREATH: 0
JOINT SWELLING: 0
PSYCHIATRIC NEGATIVE: 1
ANAL BLEEDING: 0
DIFFICULTY URINATING: 0
FEVER: 0
PALPITATIONS: 0
CHEST TIGHTNESS: 0
ABDOMINAL PAIN: 0
DIZZINESS: 0
ARTHRALGIAS: 0
FREQUENCY: 0
DIARRHEA: 0

## 2024-01-09 ENCOUNTER — OFFICE VISIT (OUTPATIENT)
Dept: PRIMARY CARE | Facility: CLINIC | Age: 74
End: 2024-01-09
Payer: MEDICARE

## 2024-01-09 VITALS
SYSTOLIC BLOOD PRESSURE: 130 MMHG | WEIGHT: 237 LBS | DIASTOLIC BLOOD PRESSURE: 70 MMHG | BODY MASS INDEX: 35 KG/M2 | HEART RATE: 68 BPM

## 2024-01-09 DIAGNOSIS — K59.04 CHRONIC IDIOPATHIC CONSTIPATION: Primary | ICD-10-CM

## 2024-01-09 DIAGNOSIS — M15.9 GENERALIZED OSTEOARTHRITIS OF MULTIPLE SITES: ICD-10-CM

## 2024-01-09 PROBLEM — K59.00 CONSTIPATION: Status: ACTIVE | Noted: 2024-01-09

## 2024-01-09 PROCEDURE — 1036F TOBACCO NON-USER: CPT | Performed by: FAMILY MEDICINE

## 2024-01-09 PROCEDURE — 1159F MED LIST DOCD IN RCRD: CPT | Performed by: FAMILY MEDICINE

## 2024-01-09 PROCEDURE — 99214 OFFICE O/P EST MOD 30 MIN: CPT | Performed by: FAMILY MEDICINE

## 2024-01-09 PROCEDURE — 1123F ACP DISCUSS/DSCN MKR DOCD: CPT | Performed by: FAMILY MEDICINE

## 2024-01-09 PROCEDURE — 1126F AMNT PAIN NOTED NONE PRSNT: CPT | Performed by: FAMILY MEDICINE

## 2024-01-09 PROCEDURE — 3075F SYST BP GE 130 - 139MM HG: CPT | Performed by: FAMILY MEDICINE

## 2024-01-09 PROCEDURE — 1160F RVW MEDS BY RX/DR IN RCRD: CPT | Performed by: FAMILY MEDICINE

## 2024-01-09 PROCEDURE — 1157F ADVNC CARE PLAN IN RCRD: CPT | Performed by: FAMILY MEDICINE

## 2024-01-09 PROCEDURE — 3008F BODY MASS INDEX DOCD: CPT | Performed by: FAMILY MEDICINE

## 2024-01-09 PROCEDURE — 3078F DIAST BP <80 MM HG: CPT | Performed by: FAMILY MEDICINE

## 2024-01-09 PROCEDURE — 1158F ADVNC CARE PLAN TLK DOCD: CPT | Performed by: FAMILY MEDICINE

## 2024-01-09 ASSESSMENT — PATIENT HEALTH QUESTIONNAIRE - PHQ9
SUM OF ALL RESPONSES TO PHQ9 QUESTIONS 1 AND 2: 0
1. LITTLE INTEREST OR PLEASURE IN DOING THINGS: NOT AT ALL
2. FEELING DOWN, DEPRESSED OR HOPELESS: NOT AT ALL

## 2024-01-09 NOTE — ASSESSMENT & PLAN NOTE
We discussed initiating a trial of Linzess at 72 mcg daily for an initial period of 3 to 4 days. If there's no improvement, we can consider increasing the dosage to 2 capsules daily. However, if there's still no benefit, it would be prudent to explore a GI referral and possibly undergo a colonoscopy to further evaluate your concerns.    Considering your history of appendectomy and prostate radiation therapy, which could potentially affect your bowel wall, we took these factors into account when discussing the possible causes of your increased constipation.    Please keep me informed about your response to the Linzess trial, and we can reassess the next steps based on the outcomes. If you have any questions or experience any concerns during this process, don't hesitate to reach out.

## 2024-01-09 NOTE — PROGRESS NOTES
Subjective   Patient ID: Sae Dan is a 73 y.o. male who presents for Abdominal Pain (On and off for the last month or so).    Past Medical, Surgical, and Family History reviewed and updated in chart.    Reviewed all medications by prescribing practitioner or clinical pharmacist (such as prescriptions, OTCs, herbal therapies and supplements) and documented in the medical record.    HPI  1.  Constipation.  Dilip has been experiencing increased constipation for the past few years, which he believes may have worsened post-radiation therapy for prostate cancer and an appendectomy in August 2023. Despite taking metamucil twice daily, he only experiences a bowel movement once or twice weekly, occasionally relying on stimulant laxatives. Dilip denies abdominal pain but reports persistent bloating and is able to pass gas without issue. There's no indication of melena, hematochezia, fevers, nausea, vomiting, or diarrhea.    Notably, Dilip's most recent colonoscopy in 2020 showed normal results. He acknowledges a decrease in physical activity and insufficient water intake. However, he maintains a good diet rich in fiber and abstains from alcohol.    Dilip is bothered by the fact that he feels like he is gaining weight, especially in the abdominal area.  Not opposed to getting another colonoscopy if it is felt to be warranted.  If he uses an OTC laxative, he has no problems with having a bowel movement.    No recent fever, chills, nausea, vomiting, but admits that there is some abdominal bloating but no true pain.  I reviewed the CT scan from August at which time he was diagnosed with appendicitis, and the rest of the scan was normal outside of the appendix problem.    2.  Diffuse arthritis.  Continues to have problems with arthralgia, especially in the back and hips.  He is not interested in pursuing surgery, but admits that since his halfway, he has not been able to do a whole lot because of his inability to walk long  distances.  He says today is fine, but using Celebrex has not provided any significant benefit that would allow him to be a bit more physically active.  He is reluctantly considering talking to an orthopedic surgeon, but does not necessarily know what the problem is that might be causing the pain that he is experiencing especially in the back and the hips.    Review of Systems  All pertinent positive symptoms are included in the history of present illness.    All other systems have been reviewed and are negative and noncontributory to this patient's current ailments.    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    Personal history of other diseases of the circulatory system 10/04/2013    History of arteriosclerotic cardiovascular disease    Personal history of other endocrine, nutritional and metabolic disease     History of Graves' disease    Personal history of other endocrine, nutritional and metabolic disease     History of thyroid disease    Shortness of breath 11/26/2018    Shortness of breath on exertion    Unspecified disorder of refraction 05/15/2014    Refractive error    Unspecified ptosis of left eyelid 05/29/2014    Ptosis of left eyelid    Unspecified ptosis of right eyelid 05/29/2014    Ptosis of right eyelid     Past Surgical History:   Procedure Laterality Date    CT ANGIO CORONARY ART WITH HEARTFLOW IF SCORE >30%  11/8/2018    CT HEART CORONARY ANGIOGRAM 11/8/2018 [facility]     MR HEAD ANGIO WO IV CONTRAST  11/17/2012    MR HEAD ANGIO WO IV CONTRAST 11/17/2012 [facility]    MR NECK ANGIO WO IV CONTRAST  11/17/2012    MR NECK ANGIO WO IV CONTRAST 11/17/2012 [facility]    OTHER SURGICAL HISTORY  08/09/2018    Endarterectomy Carotid Artery    TOTAL THYROIDECTOMY  01/09/2015    Thyroid Surgery Total Thyroidectomy     Social History     Tobacco Use    Smoking status: Former     Types: Cigarettes     Passive exposure: Past    Smokeless tobacco: Never   Substance Use Topics    Alcohol use: Yes    Drug use: Never     Family History   Problem Relation Name Age of Onset    Cancer Mother      Cataracts Father      Cancer Father      Glaucoma Other grandmother      Immunization History   Administered Date(s) Administered    Influenza, seasonal, injectable 12/21/2022    Pfizer Purple Cap SARS-CoV-2 03/08/2021, 04/05/2021    Pneumococcal, Unspecified 12/21/2016, 08/08/2018     Current Outpatient Medications   Medication Instructions    celecoxib (CELEBREX) 200 mg, oral, 2 times daily    levothyroxine (SYNTHROID, LEVOXYL) 112 mcg, oral, Daily    linaCLOtide (LINZESS) 72 mcg, oral, Daily before breakfast, Do not crush or chew.    olmesartan (BENICAR) 20 mg, oral, Daily    pravastatin (PRAVACHOL) 40 mg, oral, Daily, as directed     Allergies   Allergen Reactions    Morphine Rash       Objective   Vitals:    01/09/24 1120   BP: 130/70   Pulse: 68   Weight: 108 kg (237 lb)     Body mass index is 35 kg/m².    BP Readings from Last 3 Encounters:   01/09/24 130/70   09/07/23 150/90   04/06/23 122/70      Wt Readings from Last 3 Encounters:   01/09/24 108 kg (237 lb)   09/07/23 103 kg (227 lb)   04/06/23 107 kg (236 lb)        No visits with results within 1 Month(s) from this visit.   Latest known visit with results is:   Lab on 11/29/2023   Component Date Value    Prostate Specific AG 11/29/2023 0.19      Physical Exam  CONSTITUTIONAL - well nourished, well developed, looks like stated  age, in no acute distress, not ill-appearing, and not tired appearing  SKIN - normal skin color and pigmentation, normal skin turgor without rash, lesions, or nodules visualized  HEAD - no trauma, normocephalic  NECK - supple without rigidity, no neck mass was observed  ABDOMEN - no organomegaly, soft, nontender, moderately distended, normal bowel sounds, no guarding/rebound/rigidity, negative McBurney sign and negative Saez sign   EXTREMITIES - no obvious or evident edema, arthritic deformities in the fingers  NEUROLOGICAL - walks with a slight limp, normal balance, normal motor, no ataxia; alert, oriented and no focal signs  PSYCHIATRIC - alert, pleasant and cordial, age-appropriate    Assessment/Plan   Problem List Items Addressed This Visit       Generalized osteoarthritis of multiple sites     Suggested speaking with an orthopedic surgeon to be evaluated further and determine whether or not you have hip arthritis that would require either an intra-articular injection or a replacement.  You told me that you would speak to your wife's orthopedic surgeon, and if he has retired, then you would let me know and we can help with a referral         Constipation - Primary     We discussed initiating a trial of Linzess at 72 mcg daily for an initial period of 3 to 4 days. If there's no improvement, we can consider increasing the dosage to 2 capsules daily. However, if there's still no benefit, it would be prudent to explore a GI referral and possibly undergo a colonoscopy to further evaluate your concerns.    Considering your history of appendectomy and prostate radiation therapy, which could potentially affect your bowel wall, we took these factors into account when discussing the possible causes of your increased constipation.    Please keep me informed about your response to the Linzess trial, and we can reassess the next steps based on the outcomes. If you have any questions or experience any concerns during this  process, don't hesitate to reach out.         Relevant Medications    linaCLOtide (Linzess) 72 mcg capsule

## 2024-01-09 NOTE — ASSESSMENT & PLAN NOTE
Suggested speaking with an orthopedic surgeon to be evaluated further and determine whether or not you have hip arthritis that would require either an intra-articular injection or a replacement.  You told me that you would speak to your wife's orthopedic surgeon, and if he has retired, then you would let me know and we can help with a referral

## 2024-02-04 DIAGNOSIS — K59.04 CHRONIC IDIOPATHIC CONSTIPATION: ICD-10-CM

## 2024-03-07 DIAGNOSIS — E78.2 MIXED HYPERLIPIDEMIA: ICD-10-CM

## 2024-03-07 DIAGNOSIS — I10 PRIMARY HYPERTENSION: ICD-10-CM

## 2024-03-07 DIAGNOSIS — K59.04 CHRONIC IDIOPATHIC CONSTIPATION: ICD-10-CM

## 2024-03-07 RX ORDER — OLMESARTAN MEDOXOMIL 20 MG/1
20 TABLET ORAL DAILY
Qty: 90 TABLET | Refills: 0 | Status: SHIPPED | OUTPATIENT
Start: 2024-03-07 | End: 2024-04-25 | Stop reason: SDUPTHER

## 2024-03-07 RX ORDER — PRAVASTATIN SODIUM 40 MG/1
40 TABLET ORAL DAILY
Qty: 90 TABLET | Refills: 0 | Status: SHIPPED | OUTPATIENT
Start: 2024-03-07 | End: 2024-04-28 | Stop reason: SDUPTHER

## 2024-04-25 ENCOUNTER — LAB (OUTPATIENT)
Dept: LAB | Facility: LAB | Age: 74
End: 2024-04-25
Payer: MEDICARE

## 2024-04-25 ENCOUNTER — OFFICE VISIT (OUTPATIENT)
Dept: PRIMARY CARE | Facility: CLINIC | Age: 74
End: 2024-04-25
Payer: MEDICARE

## 2024-04-25 VITALS
SYSTOLIC BLOOD PRESSURE: 138 MMHG | BODY MASS INDEX: 35.1 KG/M2 | HEIGHT: 69 IN | WEIGHT: 237 LBS | HEART RATE: 62 BPM | DIASTOLIC BLOOD PRESSURE: 70 MMHG

## 2024-04-25 DIAGNOSIS — C61 PROSTATE CANCER (MULTI): ICD-10-CM

## 2024-04-25 DIAGNOSIS — E78.2 MIXED HYPERLIPIDEMIA: ICD-10-CM

## 2024-04-25 DIAGNOSIS — Z00.00 PHYSICAL EXAM, ANNUAL: ICD-10-CM

## 2024-04-25 DIAGNOSIS — E03.9 ACQUIRED HYPOTHYROIDISM: ICD-10-CM

## 2024-04-25 DIAGNOSIS — M15.9 GENERALIZED OSTEOARTHRITIS OF MULTIPLE SITES: ICD-10-CM

## 2024-04-25 DIAGNOSIS — Z00.00 MEDICARE ANNUAL WELLNESS VISIT, SUBSEQUENT: Primary | ICD-10-CM

## 2024-04-25 DIAGNOSIS — J44.9 CHRONIC OBSTRUCTIVE PULMONARY DISEASE, UNSPECIFIED COPD TYPE (MULTI): ICD-10-CM

## 2024-04-25 DIAGNOSIS — I73.9 PERIPHERAL VASCULAR DISEASE, UNSPECIFIED (CMS-HCC): ICD-10-CM

## 2024-04-25 DIAGNOSIS — I10 PRIMARY HYPERTENSION: ICD-10-CM

## 2024-04-25 DIAGNOSIS — N18.31 STAGE 3A CHRONIC KIDNEY DISEASE (MULTI): ICD-10-CM

## 2024-04-25 LAB
ALBUMIN SERPL BCP-MCNC: 4.2 G/DL (ref 3.4–5)
ALP SERPL-CCNC: 74 U/L (ref 33–136)
ALT SERPL W P-5'-P-CCNC: 16 U/L (ref 10–52)
ANION GAP SERPL CALC-SCNC: 14 MMOL/L (ref 10–20)
AST SERPL W P-5'-P-CCNC: 18 U/L (ref 9–39)
BASOPHILS # BLD AUTO: 0.06 X10*3/UL (ref 0–0.1)
BASOPHILS NFR BLD AUTO: 1 %
BILIRUB SERPL-MCNC: 0.3 MG/DL (ref 0–1.2)
BUN SERPL-MCNC: 33 MG/DL (ref 6–23)
CALCIUM SERPL-MCNC: 8.8 MG/DL (ref 8.6–10.3)
CHLORIDE SERPL-SCNC: 109 MMOL/L (ref 98–107)
CHOLEST SERPL-MCNC: 204 MG/DL (ref 0–199)
CHOLESTEROL/HDL RATIO: 4.5
CO2 SERPL-SCNC: 21 MMOL/L (ref 21–32)
CREAT SERPL-MCNC: 1.63 MG/DL (ref 0.5–1.3)
EGFRCR SERPLBLD CKD-EPI 2021: 44 ML/MIN/1.73M*2
EOSINOPHIL # BLD AUTO: 0.25 X10*3/UL (ref 0–0.4)
EOSINOPHIL NFR BLD AUTO: 4 %
ERYTHROCYTE [DISTWIDTH] IN BLOOD BY AUTOMATED COUNT: 14 % (ref 11.5–14.5)
GLUCOSE SERPL-MCNC: 84 MG/DL (ref 74–99)
HCT VFR BLD AUTO: 42.9 % (ref 41–52)
HDLC SERPL-MCNC: 45.4 MG/DL
HGB BLD-MCNC: 13 G/DL (ref 13.5–17.5)
IMM GRANULOCYTES # BLD AUTO: 0.02 X10*3/UL (ref 0–0.5)
IMM GRANULOCYTES NFR BLD AUTO: 0.3 % (ref 0–0.9)
LDLC SERPL CALC-MCNC: 139 MG/DL
LYMPHOCYTES # BLD AUTO: 1.72 X10*3/UL (ref 0.8–3)
LYMPHOCYTES NFR BLD AUTO: 27.8 %
MCH RBC QN AUTO: 29.1 PG (ref 26–34)
MCHC RBC AUTO-ENTMCNC: 30.3 G/DL (ref 32–36)
MCV RBC AUTO: 96 FL (ref 80–100)
MONOCYTES # BLD AUTO: 0.8 X10*3/UL (ref 0.05–0.8)
MONOCYTES NFR BLD AUTO: 12.9 %
NEUTROPHILS # BLD AUTO: 3.34 X10*3/UL (ref 1.6–5.5)
NEUTROPHILS NFR BLD AUTO: 54 %
NON HDL CHOLESTEROL: 159 MG/DL (ref 0–149)
NRBC BLD-RTO: 0 /100 WBCS (ref 0–0)
PLATELET # BLD AUTO: 173 X10*3/UL (ref 150–450)
POTASSIUM SERPL-SCNC: 4.7 MMOL/L (ref 3.5–5.3)
PROT SERPL-MCNC: 7.2 G/DL (ref 6.4–8.2)
PSA SERPL-MCNC: 0.24 NG/ML
RBC # BLD AUTO: 4.46 X10*6/UL (ref 4.5–5.9)
SODIUM SERPL-SCNC: 139 MMOL/L (ref 136–145)
TRIGL SERPL-MCNC: 99 MG/DL (ref 0–149)
TSH SERPL-ACNC: 1.48 MIU/L (ref 0.44–3.98)
VLDL: 20 MG/DL (ref 0–40)
WBC # BLD AUTO: 6.2 X10*3/UL (ref 4.4–11.3)

## 2024-04-25 PROCEDURE — 1123F ACP DISCUSS/DSCN MKR DOCD: CPT | Performed by: FAMILY MEDICINE

## 2024-04-25 PROCEDURE — 99397 PER PM REEVAL EST PAT 65+ YR: CPT | Performed by: FAMILY MEDICINE

## 2024-04-25 PROCEDURE — 1157F ADVNC CARE PLAN IN RCRD: CPT | Performed by: FAMILY MEDICINE

## 2024-04-25 PROCEDURE — 80053 COMPREHEN METABOLIC PANEL: CPT

## 2024-04-25 PROCEDURE — 85025 COMPLETE CBC W/AUTO DIFF WBC: CPT

## 2024-04-25 PROCEDURE — 1160F RVW MEDS BY RX/DR IN RCRD: CPT | Performed by: FAMILY MEDICINE

## 2024-04-25 PROCEDURE — 84443 ASSAY THYROID STIM HORMONE: CPT

## 2024-04-25 PROCEDURE — 1036F TOBACCO NON-USER: CPT | Performed by: FAMILY MEDICINE

## 2024-04-25 PROCEDURE — 1159F MED LIST DOCD IN RCRD: CPT | Performed by: FAMILY MEDICINE

## 2024-04-25 PROCEDURE — 80061 LIPID PANEL: CPT

## 2024-04-25 PROCEDURE — 99215 OFFICE O/P EST HI 40 MIN: CPT | Performed by: FAMILY MEDICINE

## 2024-04-25 PROCEDURE — 3008F BODY MASS INDEX DOCD: CPT | Performed by: FAMILY MEDICINE

## 2024-04-25 PROCEDURE — 1170F FXNL STATUS ASSESSED: CPT | Performed by: FAMILY MEDICINE

## 2024-04-25 PROCEDURE — 84153 ASSAY OF PSA TOTAL: CPT

## 2024-04-25 PROCEDURE — 3078F DIAST BP <80 MM HG: CPT | Performed by: FAMILY MEDICINE

## 2024-04-25 PROCEDURE — G0439 PPPS, SUBSEQ VISIT: HCPCS | Performed by: FAMILY MEDICINE

## 2024-04-25 PROCEDURE — 3075F SYST BP GE 130 - 139MM HG: CPT | Performed by: FAMILY MEDICINE

## 2024-04-25 PROCEDURE — 36415 COLL VENOUS BLD VENIPUNCTURE: CPT

## 2024-04-25 RX ORDER — CELECOXIB 200 MG/1
200 CAPSULE ORAL 2 TIMES DAILY
COMMUNITY
Start: 2024-03-07 | End: 2024-05-09

## 2024-04-25 RX ORDER — OLMESARTAN MEDOXOMIL 20 MG/1
20 TABLET ORAL DAILY
Qty: 90 TABLET | Refills: 1 | Status: SHIPPED | OUTPATIENT
Start: 2024-04-25 | End: 2024-10-22

## 2024-04-25 ASSESSMENT — PATIENT HEALTH QUESTIONNAIRE - PHQ9
2. FEELING DOWN, DEPRESSED OR HOPELESS: NOT AT ALL
SUM OF ALL RESPONSES TO PHQ9 QUESTIONS 1 AND 2: 0
1. LITTLE INTEREST OR PLEASURE IN DOING THINGS: NOT AT ALL

## 2024-04-25 ASSESSMENT — ENCOUNTER SYMPTOMS
DEPRESSION: 0
OCCASIONAL FEELINGS OF UNSTEADINESS: 0
LOSS OF SENSATION IN FEET: 0

## 2024-04-25 ASSESSMENT — ACTIVITIES OF DAILY LIVING (ADL)
DOING_HOUSEWORK: INDEPENDENT
DRESSING: INDEPENDENT
MANAGING_FINANCES: INDEPENDENT
BATHING: INDEPENDENT
GROCERY_SHOPPING: INDEPENDENT
TAKING_MEDICATION: INDEPENDENT

## 2024-04-25 NOTE — ASSESSMENT & PLAN NOTE
Our goal for your TSH is 0.1-2.00  Currently asymptomatic  Please obtain blood work to ensure euthyroid state

## 2024-04-25 NOTE — ASSESSMENT & PLAN NOTE
Questions answered and reviewed  Colon cancer screening no longer needed  Immunizations up-to-date

## 2024-04-25 NOTE — ASSESSMENT & PLAN NOTE
Stable, not interested in pursuing any further testing or treatment, although I would recommend continuing the CT chest as did the pulmonology team

## 2024-04-25 NOTE — PROGRESS NOTES
Subjective   Reason for Visit: Sae Dan is an 73 y.o. male here for a Medicare Annual Wellness Visit Subsequent, Hypothyroidism, Hypertension, Hyperlipidemia, COPD, and Annual Exam.     Past Medical, Surgical, and Family History reviewed and updated in chart.    Reviewed all medications by prescribing practitioner or clinical pharmacist (such as prescriptions, OTCs, herbal therapies and supplements) and documented in the medical record.    HPI  1. Medicare Wellness/Physical Exam:   Dilip is generally in good health, although he admits he is getting older and finding it more challenging to maintain physical activity. This has led to weight gain, which in turn has further limited his activity levels. His immunizations are up to date, and given his lack of family history, he no longer requires colon cancer screening.    2. Hypothyroidism:   Dilip has a history of Grave's Disease and has undergone thyroidectomy. He is currently on a daily dose of levothyroxine 112 mcg. Since his thyroid surgery, his weight has increased from approximately 185 to 237. He denies experiencing any symptoms such as palpitations, confusion, changes in bowel movements, or intolerance to heat or cold.    3. Hypertension:   Dilip is currently on a daily dose of olmesartan 20 mg, which he tolerates well. His blood pressure reading today was 122/70. He does not report any symptoms such as headaches, dizziness, vision changes, chest pain or pressure, palpitations, shortness of breath, leg edema, or any other complaints.    4. Hyperlipidemia:   Dilip has consented to have fasting blood work done today. He is currently taking pravastatin 40 mg daily.    5. Generalized Osteoarthritis:   Dilip has a history of chronic low back pain. He denies experiencing any urinary incontinence, paresthesias, or muscle weakness. He is currently taking celecoxib 200 mg 1-2 times daily as needed for low back pain and has expressed interest in obtaining a handicap  placard.    6. Pulmonary Nodule/COPD:   Dilip was previously under the care of Dr. Queen in pulmonology. A CT chest follow-up was requested for October 2024, but Dilip has declined any further workup with pulmonology, stating that he feels fine and is not worried about any pulmonary issues.    7. Chronic Kidney Disease (CKD):   At this time, Dilip is not interested in pursuing nephrology consultations for his CKD.    8. Constipation:  Linzess 145 mcg daily x 90 days alleviated his constipation  He says that it reset his whole bowel cycle, very happy and pleased with the outcome  Has a refill of the medication, and will keep it on the shelf to use it as needed    Review of Systems  All pertinent positive symptoms are included in the history of present illness.    All other systems have been reviewed and are negative and noncontributory to this patient's current ailments.    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    Personal history of other diseases of the circulatory system 10/04/2013    History of arteriosclerotic cardiovascular disease    Personal history of other endocrine, nutritional and metabolic disease     History of Graves' disease    Personal history of other endocrine, nutritional and metabolic disease     History of thyroid disease    Shortness of breath 11/26/2018    Shortness of breath on exertion    Unspecified disorder of refraction 05/15/2014    Refractive error    Unspecified ptosis of  left eyelid 05/29/2014    Ptosis of left eyelid    Unspecified ptosis of right eyelid 05/29/2014    Ptosis of right eyelid     Past Surgical History:   Procedure Laterality Date    CT ANGIO CORONARY ART WITH HEARTFLOW IF SCORE >30%  11/8/2018    CT HEART CORONARY ANGIOGRAM 11/8/2018 [facility] CL[facility]ICAL [facility]GAC[facility]    MR HEAD ANGIO WO IV CONTRAST  11/17/2012    MR HEAD ANGIO WO IV CONTRAST 11[facility]17/2012 [facility]    MR NECK ANGIO WO IV CONTRAST  11/17/2012    MR NECK ANGIO WO IV CONTRAST 11/17/2012 [facility]    OTHER SURGICAL HISTORY  08/09/2018    Endarterectomy Carotid Artery    TOTAL THYROIDECTOMY  01/09/2015    Thyroid Surgery Total Thyroidectomy     Social History     Tobacco Use    Smoking status: Former     Types: Cigarettes     Passive exposure: Past    Smokeless tobacco: Never   Substance Use Topics    Alcohol use: Yes    Drug use: Never     Family History   Problem Relation Name Age of Onset    Cancer Mother      Cataracts Father      Cancer Father      Glaucoma Other grandmother      Allergies   Allergen Reactions    Morphine Rash     Immunization History   Administered Date(s) Administered    Influenza, Seasonal, Quadrivalent, Adjuvanted 11/03/2023    Influenza, seasonal, injectable 12/21/2022    Pfizer Purple Cap SARS-CoV-2 03/08/2021, 04/05/2021    Pneumococcal, Unspecified 12/21/2016, 08/08/2018     Current Outpatient Medications   Medication Instructions    celecoxib (CELEBREX) 200 mg, oral, 2 times daily    levothyroxine (SYNTHROID, LEVOXYL) 112 mcg, oral, Daily    linaCLOtide (LINZESS) 145 mcg, oral, Daily before breakfast, Do not crush or chew.    olmesartan (BENICAR) 20 mg, oral, Daily    pravastatin (PRAVACHOL) 40 mg, oral, Daily, as directed       Patient Self Assessment of Health Status  Patient Self Assessment: Fair    Nutrition and Exercise  Current Diet: Well Balanced Diet  Adequate Fluid Intake: Yes  Caffeine: Yes  Exercise Frequency: Infrequently    Functional Ability/Level of  "Safety  Cognitive Impairment Observed: No cognitive impairment observed  Cognitive Impairment Reported: No cognitive impairment reported by patient or family    Home Safety Risk Factors: None    Objective   Visit Vitals  /70   Pulse 62   Ht 1.753 m (5' 9\")   Wt 108 kg (237 lb)   BMI 35.00 kg/m²   Smoking Status Former   BSA 2.29 m²      No visits with results within 1 Month(s) from this visit.   Latest known visit with results is:   Lab on 11/29/2023   Component Date Value    Prostate Specific AG 11/29/2023 0.19      The 10-year ASCVD risk score (Charles ROSSI, et al., 2019) is: 33.5%    Values used to calculate the score:      Age: 73 years      Sex: Male      Is Non- : No      Diabetic: No      Tobacco smoker: No      Systolic Blood Pressure: 138 mmHg      Is BP treated: Yes      HDL Cholesterol: 41.1 mg/dL      Total Cholesterol: 234 mg/dL    Physical Exam  CONSTITUTIONAL - well nourished, well developed, looks like stated age, in no acute distress, not ill-appearing, and not tired appearing  SKIN - normal skin color and pigmentation, normal skin turgor without rash, lesions, or nodules visualized  HEAD - no trauma, normocephalic  EYES - pupils are equal and reactive to light, extraocular muscles are intact, and normal external exam  CHEST - clear to auscultation, no wheezing, no crackles and no rales, good effort  CARDIAC - regular rate and regular rhythm, no skipped beats, no murmur  EXTREMITIES - no obvious or evident edema, no obvious or evident deformities  NEUROLOGICAL - normal gait, normal balance, normal motor, no ataxia, alert, oriented and no focal signs  PSYCHIATRIC - alert, pleasant and cordial, age-appropriate    Assessment/Plan   Problem List Items Addressed This Visit       Hyperlipidemia    Current Assessment & Plan     Please obtain fasting blood work and we will adjust medication accordingly         Relevant Orders    Lipid Panel    Hypertension    Current Assessment & " Plan     Stable, no changes to medication recommended         Relevant Medications    olmesartan (BENIcar) 20 mg tablet    Other Relevant Orders    CBC and Auto Differential    Comprehensive Metabolic Panel    Hypothyroidism    Current Assessment & Plan     Our goal for your TSH is 0.1-2.00  Currently asymptomatic  Please obtain blood work to ensure euthyroid state         Relevant Orders    TSH with reflex to Free T4 if abnormal    Prostate cancer (Multi)    Current Assessment & Plan     We will continue to monitor PSA to ensure stability         Relevant Orders    PSA    Stage 3a chronic kidney disease (Multi)    Current Assessment & Plan     We will continue to monitor  Continue to maintain proper hydration         Relevant Orders    Comprehensive Metabolic Panel    COPD (chronic obstructive pulmonary disease) (Multi)    Current Assessment & Plan     Stable, not interested in pursuing any further testing or treatment, although I would recommend continuing the CT chest as did the pulmonology team         Generalized osteoarthritis of multiple sites    Current Assessment & Plan     Handicap placard was provided per your request         Relevant Orders    Disability Placard    Medicare annual wellness visit, subsequent - Primary    Current Assessment & Plan     Questions answered and reviewed  Colon cancer screening no longer needed  Immunizations up-to-date         Peripheral vascular disease, unspecified (CMS-HCC)    Current Assessment & Plan     Stable, no changes to medication recommended         Physical exam, annual    Current Assessment & Plan     Complete history and physical examination was performed  We will notify of test results once available          Patient Care Team:  Mgiuel Angel Hendrix DO as PCP - General (Family Medicine)  Miguel Angel Hendrix DO as PCP - [employer] PCP

## 2024-04-28 DIAGNOSIS — E78.2 MIXED HYPERLIPIDEMIA: ICD-10-CM

## 2024-04-28 DIAGNOSIS — E03.9 ACQUIRED HYPOTHYROIDISM: ICD-10-CM

## 2024-04-28 RX ORDER — LEVOTHYROXINE SODIUM 112 UG/1
112 TABLET ORAL DAILY
Qty: 90 TABLET | Refills: 1 | Status: SHIPPED | OUTPATIENT
Start: 2024-04-28 | End: 2024-10-25

## 2024-04-28 RX ORDER — PRAVASTATIN SODIUM 40 MG/1
40 TABLET ORAL DAILY
Qty: 90 TABLET | Refills: 1 | Status: SHIPPED | OUTPATIENT
Start: 2024-04-28 | End: 2024-10-25

## 2024-04-28 NOTE — RESULT ENCOUNTER NOTE
Cholesterol 204, 45, 139, 99 previously 234, 41, 162, 155  Sugar, electrolytes, liver normal  Kidney function is showing some decline again.  If you change your mind about wanting to see a kidney specialist, please let me know so I can help arrange  Complete blood cell count continues to show some slight anemia but improved since previous  Thyroid is excellent as is your prostate cancer screening test  I will not make any changes to your medications at this time

## 2024-04-29 DIAGNOSIS — N18.31 STAGE 3A CHRONIC KIDNEY DISEASE (MULTI): Primary | ICD-10-CM

## 2024-05-08 DIAGNOSIS — K59.04 CHRONIC IDIOPATHIC CONSTIPATION: ICD-10-CM

## 2024-05-08 DIAGNOSIS — M15.9 POLYOSTEOARTHRITIS, UNSPECIFIED: ICD-10-CM

## 2024-05-09 RX ORDER — CELECOXIB 200 MG/1
200 CAPSULE ORAL 2 TIMES DAILY
Qty: 180 CAPSULE | Refills: 1 | Status: SHIPPED | OUTPATIENT
Start: 2024-05-09

## 2024-05-09 RX ORDER — LINACLOTIDE 145 UG/1
CAPSULE, GELATIN COATED ORAL
Qty: 90 CAPSULE | Refills: 1 | Status: SHIPPED | OUTPATIENT
Start: 2024-05-09

## 2024-05-29 ENCOUNTER — TELEPHONE (OUTPATIENT)
Dept: RADIATION ONCOLOGY | Facility: HOSPITAL | Age: 74
End: 2024-05-29
Payer: MEDICARE

## 2024-05-29 ASSESSMENT — ENCOUNTER SYMPTOMS
SHORTNESS OF BREATH: 0
FREQUENCY: 0
DYSURIA: 0
UNEXPECTED WEIGHT CHANGE: 0
WEAKNESS: 0
RECTAL PAIN: 0
ANAL BLEEDING: 0
DIZZINESS: 0
HEMATURIA: 0
ARTHRALGIAS: 0
COUGH: 0
CONSTIPATION: 0
ABDOMINAL PAIN: 0
JOINT SWELLING: 0
BACK PAIN: 0
FEVER: 0
BLOOD IN STOOL: 0
ALLERGIC/IMMUNOLOGIC NEGATIVE: 1
CHEST TIGHTNESS: 0
DIARRHEA: 0
PSYCHIATRIC NEGATIVE: 1
FATIGUE: 0
PALPITATIONS: 0
DIFFICULTY URINATING: 0

## 2024-05-29 NOTE — PROGRESS NOTES
Cancer synopsis:  Rad/onc:  Dr. Vázquez/ Mimi [facility]    73 year-old male with favorable intermediate risk prostate cancer, cT1c cN0 cMx, PSA 3.38 ng/mL, [place] grade group 2 (3+4 = 7), stage IIB.  The patient has a remote history of Graves' disease treated with retro-orbital radiation 2000 cGy in 10 fractions completed in 2014.  The patient had elevated PSA and MRI of the prostate done shows a 1.2 cm focus in the posterior medial left peripheral zone of the apex consistent with a PI-RADS four lesion with abutment of the capsule, no notable  extraprostatic extension, seminal vesicle invasion or pelvic lymphadenopathy.  The patient underwent transrectal ultrasound-guided biopsy of the prostate  which shows Favio grade group 2 (3+4 = 7) prostate adenocarcinoma with  intraductal carcinoma identified.  Pretreatment MALACHI score 11. IPSS 3.  Quality  of life 1 (pleased).     The radiation planning and treatment procedures were explained to the patient  in advance, and all questions were answered. The benefits and goals of  treatment, options and alternatives, limitations, side effects and risks of  radiation were also explained. The patient provided informed consent.     Technical Summary : Region(s) Treated: Prostate  Radiation Dose Prescribed:  144 Gy, minimal peripheral dose.  Radiation Technique/Machine Used: LDR prostate brachytherapy implant using  I-125. Additional radiation technical details available in our radiation  oncology EMR [employer] and our treatment planning system.     Treatment Area #1  Location : Prostate  Dates : 5/24/2021  Number of Treatments : 1  Dose : 25031 cGy  Modality : I-125 LDR     Other issues:  HLD, HTN, stage 3a CKD, hypothydism, OA, PVD    Imaging:  none    History of presenting illness:    Patient ID: [id number]     Sae Dan is a 73 y.o. male who presents for his FIR prostate cancer GS 3+4=7, IPSA <10 now s/p RT.    RT Site: Prostate  RT Date: 05/24/2021 (PSI)  Hormone therapy:  No  Hot Flushes: Denies  Fatigue: Denies  Bone pain: Denies  MALACHI: n/a virtual  ED: Does report erectile loss with aging and RT however not concerned at this time per patient  ED medications: No  IPSS: n/a virtual  Urinary symptoms: Denies hematuria, dysuria. Admits to mild weak stream however resolved as day progresses and is manageable per patient. Denies incomplete bladder emptying, urine leakage or urgency.  Urinary Medications: No  Rectal bleeding: Denies  Colonoscopy:  03/2020: normal, next in 10yrs  Other systems: Denies SOB, CP or fever. Recently had appendix removed after bursting per patient. Recovering well.    Review of systems:  Review of Systems   Constitutional:  Negative for fatigue, fever and unexpected weight change.   Respiratory:  Negative for cough, chest tightness and shortness of breath.    Cardiovascular:  Negative for chest pain, palpitations and leg swelling.   Gastrointestinal:  Negative for abdominal pain, anal bleeding, blood in stool, constipation, diarrhea and rectal pain.   Endocrine: Negative for cold intolerance, heat intolerance and polyuria.   Genitourinary:  Negative for decreased urine volume, difficulty urinating, dysuria, frequency, hematuria and urgency.   Musculoskeletal:  Negative for arthralgias, back pain, gait problem and joint swelling.   Skin: Negative.    Allergic/Immunologic: Negative.    Neurological:  Negative for dizziness, syncope and weakness.   Psychiatric/Behavioral: Negative.         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    Personal history of other diseases of the circulatory system 10/04/2013    History of arteriosclerotic cardiovascular disease    Personal history of other endocrine, nutritional and metabolic disease     History of Graves' disease    Personal history of other endocrine, nutritional and metabolic disease     History of thyroid disease    Shortness of breath 11/26/2018    Shortness of breath on exertion    Unspecified disorder of refraction 05/15/2014    Refractive error    Unspecified ptosis of left eyelid 05/29/2014    Ptosis of left eyelid    Unspecified ptosis of right eyelid 05/29/2014    Ptosis of right eyelid        Surgical/family history  Family History   Problem Relation Name Age of Onset    Cancer Mother      Cataracts Father      Cancer Father      Glaucoma Other grandmother       Past Surgical History:   Procedure Laterality Date    CT ANGIO CORONARY ART WITH HEARTFLOW IF SCORE >30%  11/8/2018    CT HEART CORONARY ANGIOGRAM 11/8/2018 [facility] CLINICAL LEGACY    MR HEAD ANGIO WO IV CONTRAST  11/17/2012    MR HEAD ANGIO WO IV CONTRAST 11/17/2012 [facility]    MR NECK ANGIO WO IV CONTRAST  11/17/2012    MR NECK ANGIO WO IV CONTRAST 11/17/2012 [facility]    OTHER SURGICAL HISTORY  08/09/2018    Endarterectomy Carotid Artery    TOTAL THYROIDECTOMY  01/09/2015    Thyroid Surgery Total Thyroidectomy        Social History  Tobacco Use: Medium Risk (4/25/2024)    Patient History     Smoking Tobacco Use: Former     Smokeless Tobacco Use: Never     Passive Exposure: Past         Current med list:  Current Outpatient Medications   Medication Instructions    celecoxib (CELEBREX) 200 mg, oral, 2 times daily    levothyroxine (SYNTHROID, LEVOXYL) 112 mcg, oral, Daily    Linzess 145 mcg capsule Take 1 capsule (145 mcg) by mouth once daily in the morning before a meal. Do not crush or chew.    olmesartan  (BENICAR) 20 mg, oral, Daily    pravastatin (PRAVACHOL) 40 mg, oral, Daily, as directed        Last recorded vital:  N/a virtual    Physical exam  N/a virtual    Pertinent labs:  Prostate Specific AG   Date/Time Value Ref Range Status   04/25/2024 09:16 AM 0.24 <=4.00 ng/mL Final       Dx:  Problem List Items Addressed This Visit    None  Visit Diagnoses       Malignant neoplasm of prostate (Multi)    -  Primary    Relevant Orders    Clinic Appointment Request Follow Up; CHON ROBB (virtual); [facility] [address] [facility] (virtual) (Completed)        PSA of 0.24 was reviewed and is stable. Review of latent SE including rectal bleeding, hematuria, urinary strictures, ED where reviewed as well as how to contact office if s/s present. Does report erectile loss with aging and RT however not concerned at this time per patient. Denies latent SE. NCCN guidelines where reviewed and routine FUV of every 3m for first year and every 6m for four years for a total of five years was discussed. Patient verbalized understanding.      PLAN:  FUV 6m virtually  Labs PSA in 6m  Imaging none  FUV other providers: PCP for routine evals    Please contact office with any concerns:  Chon Perez CNP  [phone]    I performed this visit using realtime telehealth tools, including an audio/video OR telephone connection between  patient’s name and location Sae Dan and Chon Robb CNP.    2. POS 10: Telehealth provided in patient's home.  o Patient is located in their home (which is a location other than a hospital or other  facility where the patient receives care in a private residence) when receiving  health services or health related services through telecommunication technology.

## 2024-05-31 ENCOUNTER — HOSPITAL ENCOUNTER (OUTPATIENT)
Dept: RADIATION ONCOLOGY | Facility: HOSPITAL | Age: 74
Setting detail: RADIATION/ONCOLOGY SERIES
Discharge: HOME | End: 2024-05-31
Payer: MEDICARE

## 2024-05-31 DIAGNOSIS — C61 MALIGNANT NEOPLASM OF PROSTATE (MULTI): Primary | ICD-10-CM

## 2024-05-31 PROCEDURE — 99213 OFFICE O/P EST LOW 20 MIN: CPT

## 2024-08-28 ENCOUNTER — APPOINTMENT (OUTPATIENT)
Dept: NEPHROLOGY | Facility: CLINIC | Age: 74
End: 2024-08-28
Payer: MEDICARE

## 2024-08-28 VITALS
SYSTOLIC BLOOD PRESSURE: 167 MMHG | BODY MASS INDEX: 33.01 KG/M2 | OXYGEN SATURATION: 93 % | TEMPERATURE: 97.3 F | RESPIRATION RATE: 16 BRPM | HEART RATE: 71 BPM | DIASTOLIC BLOOD PRESSURE: 77 MMHG | WEIGHT: 230.6 LBS | HEIGHT: 70 IN

## 2024-08-28 DIAGNOSIS — I10 ESSENTIAL HYPERTENSION: ICD-10-CM

## 2024-08-28 DIAGNOSIS — N18.31 STAGE 3A CHRONIC KIDNEY DISEASE (MULTI): Primary | ICD-10-CM

## 2024-08-28 LAB
APPEARANCE UR: CLEAR
BILIRUB UR STRIP.AUTO-MCNC: NEGATIVE MG/DL
COLOR UR: ABNORMAL
CREAT UR-MCNC: 145.6 MG/DL (ref 20–370)
GLUCOSE UR STRIP.AUTO-MCNC: NORMAL MG/DL
HYALINE CASTS #/AREA URNS AUTO: ABNORMAL /LPF
KETONES UR STRIP.AUTO-MCNC: NEGATIVE MG/DL
LEUKOCYTE ESTERASE UR QL STRIP.AUTO: NEGATIVE
MUCOUS THREADS #/AREA URNS AUTO: ABNORMAL /LPF
NITRITE UR QL STRIP.AUTO: NEGATIVE
PH UR STRIP.AUTO: 5.5 [PH]
POC APPEARANCE, URINE: ABNORMAL
POC BILIRUBIN, URINE: NEGATIVE
POC BLOOD, URINE: ABNORMAL
POC COLOR, URINE: YELLOW
POC GLUCOSE, URINE: NEGATIVE MG/DL
POC KETONES, URINE: NEGATIVE MG/DL
POC LEUKOCYTES, URINE: NEGATIVE
POC NITRITE,URINE: NEGATIVE
POC PH, URINE: 5.5 PH
POC PROTEIN, URINE: NEGATIVE MG/DL
POC SPECIFIC GRAVITY, URINE: 1.02
POC UROBILINOGEN, URINE: 0.2 EU/DL
PROT UR STRIP.AUTO-MCNC: NEGATIVE MG/DL
PROT UR-ACNC: 10 MG/DL (ref 5–25)
PROT/CREAT UR: 0.07 MG/MG CREAT (ref 0–0.17)
RBC # UR STRIP.AUTO: ABNORMAL /UL
RBC #/AREA URNS AUTO: ABNORMAL /HPF
SP GR UR STRIP.AUTO: 1.02
UROBILINOGEN UR STRIP.AUTO-MCNC: NORMAL MG/DL
WBC #/AREA URNS AUTO: ABNORMAL /HPF

## 2024-08-28 PROCEDURE — 3078F DIAST BP <80 MM HG: CPT | Performed by: INTERNAL MEDICINE

## 2024-08-28 PROCEDURE — 81001 URINALYSIS AUTO W/SCOPE: CPT

## 2024-08-28 PROCEDURE — 1123F ACP DISCUSS/DSCN MKR DOCD: CPT | Performed by: INTERNAL MEDICINE

## 2024-08-28 PROCEDURE — 1157F ADVNC CARE PLAN IN RCRD: CPT | Performed by: INTERNAL MEDICINE

## 2024-08-28 PROCEDURE — 81003 URINALYSIS AUTO W/O SCOPE: CPT | Performed by: INTERNAL MEDICINE

## 2024-08-28 PROCEDURE — 3008F BODY MASS INDEX DOCD: CPT | Performed by: INTERNAL MEDICINE

## 2024-08-28 PROCEDURE — 84156 ASSAY OF PROTEIN URINE: CPT

## 2024-08-28 PROCEDURE — 99205 OFFICE O/P NEW HI 60 MIN: CPT | Performed by: INTERNAL MEDICINE

## 2024-08-28 PROCEDURE — 3077F SYST BP >= 140 MM HG: CPT | Performed by: INTERNAL MEDICINE

## 2024-08-28 PROCEDURE — 82570 ASSAY OF URINE CREATININE: CPT

## 2024-08-28 PROCEDURE — 1159F MED LIST DOCD IN RCRD: CPT | Performed by: INTERNAL MEDICINE

## 2024-08-28 PROCEDURE — 82043 UR ALBUMIN QUANTITATIVE: CPT

## 2024-08-28 RX ORDER — ASPIRIN 81 MG/1
81 TABLET ORAL DAILY
COMMUNITY

## 2024-08-28 NOTE — PROGRESS NOTES
Subjective       Sae Dan is a 73 y.o. male who has past medical history of HTN, HLD, bilateral carotid stenosis, PAD, hypothyroidism, OA, prostate cancer, cerebral infarction, and COPD presents for initial consultation for CKD management.    Sae comes in today with his wife, Alie. Referral by PCP, Dr. Hendrix, for acute worsening of kidney function in May 2024. In Dec 2021, SCr increased from 1.3 to 1.42. This continued to rise up until August 2023 with a peak at 1.85. This coincided with hospital admission for acute appendicitis. Repeat Scr 1.24 in September 2023. In April 2024, Scr increased again to 1.63.  Currently has brachytherapy in place. PSA stable per radiation oncology. No history of external radiation. BP today was slightly elevated compared to prior office visits. States that he was walking around the building outside, which led to this elevation. Reports that his SBP at home runs around 120s. States that he drinks >50 oz of water per day. Discussed limiting sodium rich foods. Reports that he originally gained weight due to long-term prednisone use for treatment of Grave's ophthalmopathy. No history of kidney stones.     Objective   There were no vitals taken for this visit.  Wt Readings from Last 3 Encounters:   04/25/24 108 kg (237 lb)   01/09/24 108 kg (237 lb)   09/07/23 103 kg (227 lb)       Physical Exam    General appearance: no distress awake and alert on room air, euvolemic on exam  Eyes: non-icteric  HEENT: atrumatic head, PEERLA, moist mucosa  Skin: no apparent rash  Heart: NSR, S1, S2 normal, no murmur or gallop  Lungs: Symmetrical expansion, wheezing bilaterally  Abdomen: soft, nt/nd, obese  Extremities: no edema bilat  Neuro: No FND,asterixis, no focal deficits noticed        Review of Systems     Constitutional: no fever, no chills, no recent weight gain and no recent weight loss.   Eyes: no blurred vision and no diplopia.   ENT: no hearing loss, no earache, no sore throat,  "no swollen glands in the neck and no nasal discharge.   Cardiovascular: no chest pain, no palpitations and no lower extremity edema.   Respiratory: no shortness of breath, no chronic cough and no shortness of breath during exertion.   Gastrointestinal: no abdominal pain, no constipation, no heartburn, no vomiting, no bloody stools and no change in bowel movements.   Genitourinary: no dysuria and no hematuria.   Musculoskeletal: no arthralgias and no myalgias.   Skin: no rashes and no skin lesions.   Neurological: no headaches and no dizziness.   Psychiatric: no confusion, no depression and no anxiety.   Endocrine: no heat intolerance, no cold intolerance, appetite not increased, no thyroid disorder, no increased urinary frequency and no dry skin.   Hematologic/Lymphatic: does not bleed easily and does not bruise easily.   All other systems have been reviewed and are negative for complaint.         Data Review                   No results found for: \"URICACID\"        No results found for: \"HGBA1C\"              No lab exists for component: \"CR\", \"PHOSPHORUS\"        No results found for: \"MICROALBCREA\"         RFP  Recent Labs     04/25/24  0916 09/07/23  1111 08/05/23  0544 08/04/23  1418 12/21/22  0750 06/07/22  1054 12/13/21  0737 05/05/21  1102 03/17/21  0807    139 134* 135* 143 141 139   < > 141   K 4.7 4.5 5.2 5.2 4.6 4.9 4.4   < > 4.4   * 108* 105 107 108* 110* 107   < > 108*   CO2 21 23 21 20* 24 22 25   < > 22*   BUN 33* 22 28* 24* 31* 28* 21   < > 24   CREATININE 1.63* 1.24 1.85* 1.62* 1.65* 1.52* 1.42*   < > 1.3   GLUCOSE 84 94 144* 97 93 81 99   < > 100*   CALCIUM 8.8 8.9 8.3* 8.7 9.0 9.2 8.6   < > 9.2   EGFR 44*  --   --   --   --   --   --   --  58   ANIONGAP 14 13 13 13 16 14 11   < > 11    < > = values in this interval not displayed.        Urineanalysis  Recent Labs     05/05/21  1102 03/17/21  0803   COLORU YELLOW YELLOW   APPEARANCEU CLEAR CLEAR   SPECGRAVU 1.008 1.025   MARISA 6.0 5.5 "   PROTUR NEGATIVE NEGATIVE   GLUCOSEU NEGATIVE NEGATIVE   BLOODU NEGATIVE NEGATIVE   KETONESU NEGATIVE NEGATIVE   BILIRUBINU NEGATIVE NEGATIVE   NITRITEU NEGATIVE NEGATIVE   LEUKOCYTESU NEGATIVE NEGATIVE       Urine Electrolytes  Recent Labs     05/05/21  1102 03/17/21  0803   PROTUR NEGATIVE NEGATIVE        Urine Micro  Recent Labs     03/17/21  0803   WBCU NONE SEEN   RBCU NONE SEEN        Iron  Recent Labs     11/08/18  1110   IRON 107   TIBC 375   IRONSAT 29   FERRITIN 1,073*          Current Outpatient Medications on File Prior to Visit   Medication Sig Dispense Refill    celecoxib (CeleBREX) 200 mg capsule TAKE 1 CAPSULE BY MOUTH TWICE DAILY 180 capsule 1    levothyroxine (Synthroid, Levoxyl) 112 mcg tablet Take 1 tablet (112 mcg) by mouth once daily. 90 tablet 1    Linzess 145 mcg capsule Take 1 capsule (145 mcg) by mouth once daily in the morning before a meal. Do not crush or chew. 90 capsule 1    olmesartan (BENIcar) 20 mg tablet Take 1 tablet (20 mg) by mouth once daily. 90 tablet 1    pravastatin (Pravachol) 40 mg tablet Take 1 tablet (40 mg) by mouth once daily. as directed 90 tablet 1     No current facility-administered medications on file prior to visit.           Assessment and Plan       Sae Dan  is a 73 y.o. male who has past medical history of HTN, HLD, bilateral carotid stenosis, PAD, hypothyroidism, OA, prostate cancer, cerebral infarction, and COPD presents for initial consultation for CKD management.    # Chronic kidney disease - G3bA1  - Baseline SCr 1.3-1.6 in 2023  - Most recent Scr 1.63, GFR 44  - Most likely related to atherosclerosis and chronic HTN  - Urine dipstick in office today showed blood, will send microscopy  - Prior CT August 2023-Kidneys/collecting system/urinary bladder: Right renal cyst. Left renal subcentimeter low-attenuation lesion, too small to characterize.   - Lytes: Cl 109; K, Na wnl  - Celecoxib 200mg bid for OA, switch to Tylenol prn for GFR preservation    - Repeat CKD labs for new baseline in one week, hold celecoxib prior to drawing labs  - Obtain renal US due to blood in urine. No prior UA to compare. Blood possibly due to prior radiation vs kidney lesion seen on CT    # BP - today at office 151/75 without orthostatic vitals   - average home reading with systolic 120s  - Current meds: Olmesartan 20mg  - Low threshold to start diuretic in the future  - No Changes     #Anemia Hb-13  - No recent iron studies    #(CKD)-MBD  - Ca 8.8, PTH xx, Vit D xx    # CVS  -Pravastatin 40mg  -    #No Acidosis   #No diabetes    #Others  - No NSAIDs, no contrast as possible. If to be done- we recommend holding ACEi/ARBS/diuretics 24 hrs prior to contrast exposure and ensure appropriate hydration   - Ensure well hydration  - Limit salt in diet  - No smoking    Patient received CKD education and counseling. Please follow up with labs and UA prior to the next office visit in 6 months.    LUIGI Rocha IV      Patient was seen and examined.  Consultation for worsening CKD in the setting of NSAID use.  Holding offending agent.  Repeat blood work in 1 week.  Kidney ultrasound for microscopic hematuria noticed.  Follow-up closely    Dr. Katie FROST

## 2024-08-28 NOTE — PATIENT INSTRUCTIONS
Dear Sae-it was nice meeting you nephrology clinic today discuss the following    # Chronic kidney disease stage III-baseline kidney function 40-50%.  Today we discussed holding Celebrex/NSAID medications.  Will repeat blood work in 1 week    # Hypertension-in good control at home.  Continue olmesartan 20 mg.  We discussed low-salt diet and healthy lifestyle and appropriate hydration    # Microscopic hematuria/blood in the urine-will check kidney ultrasound    Will repeat blood work in 1 week-you will hear with results and further recommendation  Follow-up in 6 months with another repeat blood work analysis prior to next visit    Keely Wilson MD, MS, JEFFREY WADDELL   Clinical  - [employer] [facility] [employer] [facility] [employer]   Nephrologist - [facility]

## 2024-08-29 LAB
CREAT UR-MCNC: 146.5 MG/DL (ref 20–370)
MICROALBUMIN UR-MCNC: 13.8 MG/L
MICROALBUMIN/CREAT UR: 9.4 UG/MG CREAT

## 2024-09-03 ENCOUNTER — HOSPITAL ENCOUNTER (OUTPATIENT)
Dept: RADIOLOGY | Facility: CLINIC | Age: 74
Discharge: HOME | End: 2024-09-03
Payer: MEDICARE

## 2024-09-03 DIAGNOSIS — I10 ESSENTIAL HYPERTENSION: ICD-10-CM

## 2024-09-03 DIAGNOSIS — N18.31 STAGE 3A CHRONIC KIDNEY DISEASE (MULTI): ICD-10-CM

## 2024-09-03 PROCEDURE — 76770 US EXAM ABDO BACK WALL COMP: CPT

## 2024-09-03 PROCEDURE — 76770 US EXAM ABDO BACK WALL COMP: CPT | Performed by: RADIOLOGY

## 2024-09-11 ENCOUNTER — APPOINTMENT (OUTPATIENT)
Dept: PRIMARY CARE | Facility: CLINIC | Age: 74
End: 2024-09-11
Payer: MEDICARE

## 2024-09-16 ENCOUNTER — OFFICE VISIT (OUTPATIENT)
Dept: PRIMARY CARE | Facility: CLINIC | Age: 74
End: 2024-09-16
Payer: MEDICARE

## 2024-09-16 VITALS
WEIGHT: 232 LBS | DIASTOLIC BLOOD PRESSURE: 62 MMHG | HEART RATE: 76 BPM | HEIGHT: 70 IN | BODY MASS INDEX: 33.21 KG/M2 | SYSTOLIC BLOOD PRESSURE: 122 MMHG

## 2024-09-16 DIAGNOSIS — R93.2 ABNORMAL LIVER ULTRASOUND: ICD-10-CM

## 2024-09-16 DIAGNOSIS — E78.2 MIXED HYPERLIPIDEMIA: ICD-10-CM

## 2024-09-16 DIAGNOSIS — Z23 FLU VACCINE NEED: ICD-10-CM

## 2024-09-16 DIAGNOSIS — E03.9 ACQUIRED HYPOTHYROIDISM: ICD-10-CM

## 2024-09-16 DIAGNOSIS — I10 PRIMARY HYPERTENSION: ICD-10-CM

## 2024-09-16 DIAGNOSIS — K59.04 CHRONIC IDIOPATHIC CONSTIPATION: ICD-10-CM

## 2024-09-16 DIAGNOSIS — R91.1 PULMONARY NODULE: Primary | ICD-10-CM

## 2024-09-16 DIAGNOSIS — N18.31 STAGE 3A CHRONIC KIDNEY DISEASE (MULTI): ICD-10-CM

## 2024-09-16 PROCEDURE — 1159F MED LIST DOCD IN RCRD: CPT | Performed by: FAMILY MEDICINE

## 2024-09-16 PROCEDURE — 3074F SYST BP LT 130 MM HG: CPT | Performed by: FAMILY MEDICINE

## 2024-09-16 PROCEDURE — 90662 IIV NO PRSV INCREASED AG IM: CPT | Performed by: FAMILY MEDICINE

## 2024-09-16 PROCEDURE — 3078F DIAST BP <80 MM HG: CPT | Performed by: FAMILY MEDICINE

## 2024-09-16 PROCEDURE — 1123F ACP DISCUSS/DSCN MKR DOCD: CPT | Performed by: FAMILY MEDICINE

## 2024-09-16 PROCEDURE — 99214 OFFICE O/P EST MOD 30 MIN: CPT | Performed by: FAMILY MEDICINE

## 2024-09-16 PROCEDURE — G0008 ADMIN INFLUENZA VIRUS VAC: HCPCS | Performed by: FAMILY MEDICINE

## 2024-09-16 PROCEDURE — 1036F TOBACCO NON-USER: CPT | Performed by: FAMILY MEDICINE

## 2024-09-16 PROCEDURE — 1157F ADVNC CARE PLAN IN RCRD: CPT | Performed by: FAMILY MEDICINE

## 2024-09-16 PROCEDURE — 1160F RVW MEDS BY RX/DR IN RCRD: CPT | Performed by: FAMILY MEDICINE

## 2024-09-16 PROCEDURE — 3008F BODY MASS INDEX DOCD: CPT | Performed by: FAMILY MEDICINE

## 2024-09-16 PROCEDURE — G2211 COMPLEX E/M VISIT ADD ON: HCPCS | Performed by: FAMILY MEDICINE

## 2024-09-16 NOTE — ASSESSMENT & PLAN NOTE
Problem: Pain  Goal: #Acceptable pain level achieved/maintained at rest using NRS/Faces  Description: This goal is used for patients who can self-report.  Acceptable means the level is at or below the identified comfort/function goal.  Outcome: Outcome Not Met, Continue to Monitor  Goal: # Acceptable pain level achieved/maintained at rest using NRS/Faces without oversedation (opioid naive or PCA/Epidural infusion)  Description: This goal is used if Opioid-naïve or on PCA/Epidural Infusion.  Outcome: Outcome Not Met, Continue to Monitor  Goal: # Acceptable pain level achieved/maintained with activity using NRS/Faces  Description: This goal is used for patients who can self-report and are not achieving acceptable pain control during activity.  Outcome: Outcome Not Met, Continue to Monitor  Goal: Acceptable pain/comfort level is achieved/maintained at rest (based on Pain Behaviors Scale)  Description: This goal is used for patients who are not able to self-report pain and are assessed for pain using the Pain Behaviors Scale  Outcome: Outcome Not Met, Continue to Monitor  Goal: # Verbalizes understanding of pain management  Description: Documented in Patient Education Activity  Outcome: Outcome Not Met, Continue to Monitor     Patient alert and oriented x 4. Patient on 1L nasal cannula saturating well. Patient vitals stable, afebrile. No pain indicated at this time. Patient with no n/v during shift. All due medications given. Patient NPO for paracentesis today. No signs of distress. Fall precautions in place. Bed alarm set. Patient instructed to call for assistance. Frequent rounding complete. Will continue to monitor. Rae Davila RN     CT chest from 2023 was reviewed  Some pulmonary nodules noted along with lymphadenopathy in the hilar and mediastinum area indicative of possible reactive nodes, or even neoplasia, consider sarcoid  Repeat CT chest recommended by radiologist and pulmonology  Please obtain October 16, 2024 beyond, order has been placed

## 2024-09-16 NOTE — PROGRESS NOTES
"Subjective   Patient ID: Sae Dan is a 73 y.o. male who presents for Follow-up (Ultrasound review).    Past Medical, Surgical, and Family History reviewed and updated in chart.    Reviewed all medications by prescribing practitioner or clinical pharmacist (such as prescriptions, OTCs, herbal therapies and supplements) and documented in the medical record.    HPI  1. Follow-up for Ultrasound Review  Don's ultrasound on 08/28/2024 indicated \"increased hepatic echogenicity, which may be consistent with hepatic steatosis or hepatocellular disease.\" The patient expressed concerns regarding the potential implications of liver disease.     The last laboratory tests, conducted on 04/25/2024, showed AST at 18 U/L, ALT at 16 U/L, cholesterol at 204 mg/dL, HDL at 45.5 mg/dL, LDL at 139 mg/dL, and triglycerides at 99 mg/dL.     A previous CT abdomen scan on 08/04/2023 reported an unremarkable liver.     An MRI of the liver on 11/07/2018 showed the liver was not enlarged, had a normal signal, and was smooth in contour. Fasting blood work will be obtained at a later date.    2. Lung Nodule  A previous chest CT on 10/16/2023 revealed a stable 5 mm nodule in the inferior lingula, along with several subpleural nodules in the anterior right lung base measuring up to 5 mm and in the left lung base measuring up to 5 mm. We will continue to monitor these findings.    3. Chronic Kidney Disease  The patient is under the care of nephrologist Dr. Keely Wilson, who has recommended discontinuing Celecoxib to preserve kidney function.  He will continue to follow with nephrology on an annual basis, and according to Dr. Wilson's test result note, she had suggested to him that the renal cysts are benign, but she is going to follow them on an annual basis with an ultrasound.    4. Immunization  The patient agreed to receive a flu vaccine.    Review of Systems  All pertinent positive symptoms are included in the history of present " illness.    All other systems have been reviewed and are negative and noncontributory to this patient's current ailments.    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    Personal history of other diseases of the circulatory system 10/04/2013    History of arteriosclerotic cardiovascular disease    Personal history of other endocrine, nutritional and metabolic disease     History of Graves' disease    Personal history of other endocrine, nutritional and metabolic disease     History of thyroid disease    Shortness of breath 11/26/2018    Shortness of breath on exertion    Unspecified disorder of refraction 05/15/2014    Refractive error    Unspecified ptosis of left eyelid 05/29/2014    Ptosis of left eyelid    Unspecified ptosis of right eyelid 05/29/2014    Ptosis of right eyelid     Past Surgical History:   Procedure Laterality Date    CT ANGIO CORONARY ART WITH HEARTFLOW IF SCORE >30%  11/8/2018    CT HEART CORONARY ANGIOGRAM 11/8/2018 [facility]    MR HEAD ANGIO WO IV CONTRAST  11/17/2012    MR HEAD ANGIO WO IV CONTRAST 11/17/2012 [facility]    MR NECK ANGIO WO IV CONTRAST  11/17/2012    MR NECK ANGIO WO IV CONTRAST 11/17/2012 [facility]    OTHER SURGICAL HISTORY  08/09/2018    Endarterectomy Carotid Artery    TOTAL THYROIDECTOMY  01/09/2015    Thyroid Surgery Total Thyroidectomy     Social History     Tobacco Use    Smoking status: Former     Types: Cigarettes      "Passive exposure: Past    Smokeless tobacco: Never   Substance Use Topics    Alcohol use: Yes    Drug use: Never     Family History   Problem Relation Name Age of Onset    Cancer Mother      Cataracts Father      Cancer Father      Glaucoma Other grandmother      Immunization History   Administered Date(s) Administered    Flu vaccine, trivalent, preservative free, HIGH-DOSE, age 65y+ (Fluzone) 12/09/2014, 09/16/2024    Influenza, Seasonal, Quadrivalent, Adjuvanted 11/03/2023    Influenza, seasonal, injectable 12/21/2016, 12/21/2022    Pfizer Purple Cap SARS-CoV-2 03/08/2021, 04/05/2021    Pneumococcal, Unspecified 12/21/2016, 08/08/2018     Current Outpatient Medications   Medication Instructions    aspirin 81 mg, oral, Daily    levothyroxine (SYNTHROID, LEVOXYL) 112 mcg, oral, Daily    Linzess 145 mcg capsule Take 1 capsule (145 mcg) by mouth once daily in the morning before a meal. Do not crush or chew.    olmesartan (BENICAR) 20 mg, oral, Daily    pravastatin (PRAVACHOL) 40 mg, oral, Daily, as directed     Allergies   Allergen Reactions    Morphine Rash       Objective   Vitals:    09/16/24 1315   BP: 122/62   Pulse: 76   Weight: 105 kg (232 lb)   Height: 1.765 m (5' 9.5\")     Body mass index is 33.77 kg/m².    BP Readings from Last 3 Encounters:   09/16/24 122/62   08/28/24 167/77   04/25/24 138/70      Wt Readings from Last 3 Encounters:   09/16/24 105 kg (232 lb)   08/28/24 105 kg (230 lb 9.6 oz)   04/25/24 108 kg (237 lb)      Office Visit on 08/28/2024   Component Date Value    POC Color, Urine 08/28/2024 Yellow     POC Appearance, Urine 08/28/2024 Hazy (A)     POC Glucose, Urine 08/28/2024 NEGATIVE     POC Bilirubin, Urine 08/28/2024 NEGATIVE     POC Ketones, Urine 08/28/2024 NEGATIVE     POC Specific Gravity, Ur* 08/28/2024 1.025     POC Blood, Urine 08/28/2024 MODERATE (2+) (A)     POC PH, Urine 08/28/2024 5.5     POC Protein, Urine 08/28/2024 NEGATIVE     POC Urobilinogen, Urine 08/28/2024 0.2     Poc " Nitrite, Urine 08/28/2024 NEGATIVE     POC Leukocytes, Urine 08/28/2024 NEGATIVE     Albumin, Urine Random 08/28/2024 13.8     Creatinine, Urine Random 08/28/2024 146.5     Albumin/Creatinine Ratio 08/28/2024 9.4     Total Protein, Urine Ran* 08/28/2024 10     Creatinine, Urine Random 08/28/2024 145.6     T. Protein/Creatinine Ra* 08/28/2024 0.07     Color, Urine 08/28/2024 Light-Yellow     Appearance, Urine 08/28/2024 Clear     Specific Gravity, Urine 08/28/2024 1.020     pH, Urine 08/28/2024 5.5     Protein, Urine 08/28/2024 NEGATIVE     Glucose, Urine 08/28/2024 Normal     Blood, Urine 08/28/2024 0.5 (2+) (A)     Ketones, Urine 08/28/2024 NEGATIVE     Bilirubin, Urine 08/28/2024 NEGATIVE     Urobilinogen, Urine 08/28/2024 Normal     Nitrite, Urine 08/28/2024 NEGATIVE     Leukocyte Esterase, Urine 08/28/2024 NEGATIVE     WBC, Urine 08/28/2024 1-5     RBC, Urine 08/28/2024 11-20 (A)     Mucus, Urine 08/28/2024 FEW     Hyaline Casts, Urine 08/28/2024 4+ (A)      Physical Exam  CONSTITUTIONAL - well nourished, well developed, looks like stated age, in no acute distress, not ill-appearing, and not tired appearing  SKIN - normal skin color and pigmentation, normal skin turgor without rash, lesions, or nodules visualized  HEAD - no trauma, normocephalic  EYES - extraocular muscles are intact, and normal external exam  NECK - supple without rigidity, no neck mass was observed, no thyromegaly or thyroid nodules  EXTREMITIES - no obvious or evident edema, no obvious or evident deformities  NEUROLOGICAL - normal gait, normal balance, normal motor, no ataxia, ; alert, oriented and no focal signs  PSYCHIATRIC - alert, pleasant and cordial, age-appropriate  IMMUNOLOGIC - no cervical lymphadenopathy    Assessment/Plan   Problem List Items Addressed This Visit       Hyperlipidemia     Please obtain fasting blood work and we will adjust medication accordingly         Relevant Orders    Lipid Panel    Hepatic function panel     Hypertension     Stable, no changes to medication recommended         Hypothyroidism     Our goal for your TSH is 0.1-2.00  Currently asymptomatic  Please obtain blood work to ensure euthyroid state         Relevant Orders    TSH with reflex to Free T4 if abnormal    Stage 3a chronic kidney disease (Multi)     We will continue to monitor  We have reviewed your note from nephrology  Continue trying to avoid NSAIDs in the future  Tylenol should be used as instructed and as needed for pain  Please continue to follow-up with nephrology per protocol         Pulmonary nodule - Primary     CT chest from 2023 was reviewed  Some pulmonary nodules noted along with lymphadenopathy in the hilar and mediastinum area indicative of possible reactive nodes, or even neoplasia, consider sarcoid  Repeat CT chest recommended by radiologist and pulmonology  Please obtain October 16, 2024 beyond, order has been placed         Relevant Orders    CT chest wo IV contrast    Constipation     Continue Linzess on an as-needed basis as it seems to be well-controlled for you         Abnormal liver ultrasound     Your renal ultrasound that was done by nephrology revealed potential possible fatty liver, but as we reviewed with you, your liver has never been problematic on MRI or CT    We're going to do a CT of your chest to evaluate for pulmonary nodules, and I suspect that they are going to look at the liver on that as well for you          Other Visit Diagnoses       Flu vaccine need        Relevant Orders    Flu vaccine, trivalent, preservative free, HIGH-DOSE, age 65y+ (Fluzone) (Completed)

## 2024-09-16 NOTE — ASSESSMENT & PLAN NOTE
Your renal ultrasound that was done by nephrology revealed potential possible fatty liver, but as we reviewed with you, your liver has never been problematic on MRI or CT    We're going to do a CT of your chest to evaluate for pulmonary nodules, and I suspect that they are going to look at the liver on that as well for you

## 2024-09-16 NOTE — ASSESSMENT & PLAN NOTE
We will continue to monitor  We have reviewed your note from nephrology  Continue trying to avoid NSAIDs in the future  Tylenol should be used as instructed and as needed for pain  Please continue to follow-up with nephrology per protocol

## 2024-09-19 ENCOUNTER — LAB (OUTPATIENT)
Dept: LAB | Facility: LAB | Age: 74
End: 2024-09-19
Payer: MEDICARE

## 2024-09-19 ENCOUNTER — APPOINTMENT (OUTPATIENT)
Dept: PRIMARY CARE | Facility: CLINIC | Age: 74
End: 2024-09-19
Payer: MEDICARE

## 2024-09-19 DIAGNOSIS — E78.2 MIXED HYPERLIPIDEMIA: ICD-10-CM

## 2024-09-19 DIAGNOSIS — E03.9 ACQUIRED HYPOTHYROIDISM: ICD-10-CM

## 2024-09-19 LAB
ALBUMIN SERPL BCP-MCNC: 4.2 G/DL (ref 3.4–5)
ALP SERPL-CCNC: 83 U/L (ref 33–136)
ALT SERPL W P-5'-P-CCNC: 13 U/L (ref 10–52)
AST SERPL W P-5'-P-CCNC: 15 U/L (ref 9–39)
BILIRUB DIRECT SERPL-MCNC: 0 MG/DL (ref 0–0.3)
BILIRUB SERPL-MCNC: 0.2 MG/DL (ref 0–1.2)
CHOLEST SERPL-MCNC: 191 MG/DL (ref 0–199)
CHOLESTEROL/HDL RATIO: 4.5
HDLC SERPL-MCNC: 42.8 MG/DL
LDLC SERPL CALC-MCNC: 125 MG/DL
NON HDL CHOLESTEROL: 148 MG/DL (ref 0–149)
PROT SERPL-MCNC: 7.1 G/DL (ref 6.4–8.2)
TRIGL SERPL-MCNC: 117 MG/DL (ref 0–149)
TSH SERPL-ACNC: 0.69 MIU/L (ref 0.44–3.98)
VLDL: 23 MG/DL (ref 0–40)

## 2024-09-19 PROCEDURE — 84450 TRANSFERASE (AST) (SGOT): CPT

## 2024-09-19 PROCEDURE — 82247 BILIRUBIN TOTAL: CPT

## 2024-09-19 PROCEDURE — 82040 ASSAY OF SERUM ALBUMIN: CPT

## 2024-09-19 PROCEDURE — 84075 ASSAY ALKALINE PHOSPHATASE: CPT

## 2024-09-19 PROCEDURE — 84460 ALANINE AMINO (ALT) (SGPT): CPT

## 2024-09-19 PROCEDURE — 80061 LIPID PANEL: CPT

## 2024-09-19 PROCEDURE — 84155 ASSAY OF PROTEIN SERUM: CPT

## 2024-09-19 PROCEDURE — 84443 ASSAY THYROID STIM HORMONE: CPT

## 2024-09-19 PROCEDURE — 36415 COLL VENOUS BLD VENIPUNCTURE: CPT

## 2024-09-20 DIAGNOSIS — E03.9 ACQUIRED HYPOTHYROIDISM: ICD-10-CM

## 2024-09-20 DIAGNOSIS — I10 PRIMARY HYPERTENSION: ICD-10-CM

## 2024-09-20 DIAGNOSIS — E78.2 MIXED HYPERLIPIDEMIA: ICD-10-CM

## 2024-09-20 RX ORDER — OLMESARTAN MEDOXOMIL 20 MG/1
20 TABLET ORAL DAILY
Qty: 90 TABLET | Refills: 1 | Status: SHIPPED | OUTPATIENT
Start: 2024-09-20 | End: 2025-03-19

## 2024-09-20 RX ORDER — PRAVASTATIN SODIUM 80 MG/1
80 TABLET ORAL DAILY
Qty: 90 TABLET | Refills: 1 | Status: SHIPPED | OUTPATIENT
Start: 2024-09-20 | End: 2025-03-19

## 2024-09-20 RX ORDER — LEVOTHYROXINE SODIUM 112 UG/1
112 TABLET ORAL DAILY
Qty: 90 TABLET | Refills: 1 | Status: SHIPPED | OUTPATIENT
Start: 2024-09-20 | End: 2025-03-19

## 2024-09-20 NOTE — RESULT ENCOUNTER NOTE
Cholesterol 191, 42, 125, 117 previous 204, 45, 139, 99.  We need to get that bad cholesterol down a bit more some increasing pravastatin from 40 mg daily to 80 mg daily  Liver function testing looks normal  Thyroid is excellent with a TSH of 0.69.  Stable, no changes to this medication dose  All medication has been sent for 6 months to [facility]

## 2024-09-24 DIAGNOSIS — M79.603 PAIN OF UPPER EXTREMITY, UNSPECIFIED LATERALITY: Primary | ICD-10-CM

## 2024-10-01 ENCOUNTER — LAB (OUTPATIENT)
Dept: LAB | Facility: LAB | Age: 74
End: 2024-10-01
Payer: MEDICARE

## 2024-10-01 DIAGNOSIS — N18.31 STAGE 3A CHRONIC KIDNEY DISEASE (MULTI): ICD-10-CM

## 2024-10-01 DIAGNOSIS — I10 ESSENTIAL HYPERTENSION: ICD-10-CM

## 2024-10-01 LAB
ALBUMIN SERPL BCP-MCNC: 4.4 G/DL (ref 3.4–5)
ANION GAP SERPL CALC-SCNC: 15 MMOL/L (ref 10–20)
BUN SERPL-MCNC: 42 MG/DL (ref 6–23)
CALCIUM SERPL-MCNC: 9.3 MG/DL (ref 8.6–10.3)
CHLORIDE SERPL-SCNC: 108 MMOL/L (ref 98–107)
CO2 SERPL-SCNC: 21 MMOL/L (ref 21–32)
CREAT SERPL-MCNC: 1.75 MG/DL (ref 0.5–1.3)
EGFRCR SERPLBLD CKD-EPI 2021: 40 ML/MIN/1.73M*2
GLUCOSE SERPL-MCNC: 99 MG/DL (ref 74–99)
PHOSPHATE SERPL-MCNC: 3.7 MG/DL (ref 2.5–4.9)
POTASSIUM SERPL-SCNC: 4.9 MMOL/L (ref 3.5–5.3)
SODIUM SERPL-SCNC: 139 MMOL/L (ref 136–145)

## 2024-10-01 PROCEDURE — 80069 RENAL FUNCTION PANEL: CPT

## 2024-10-01 PROCEDURE — 36415 COLL VENOUS BLD VENIPUNCTURE: CPT

## 2024-10-16 ENCOUNTER — HOSPITAL ENCOUNTER (OUTPATIENT)
Dept: RADIOLOGY | Facility: CLINIC | Age: 74
Discharge: HOME | End: 2024-10-16
Payer: MEDICARE

## 2024-10-16 DIAGNOSIS — R91.1 PULMONARY NODULE: ICD-10-CM

## 2024-10-16 PROCEDURE — 71250 CT THORAX DX C-: CPT

## 2024-10-16 PROCEDURE — 71250 CT THORAX DX C-: CPT | Performed by: RADIOLOGY

## 2024-10-20 NOTE — RESULT ENCOUNTER NOTE
The scan shows that your lung nodules are relatively stable with no change in size, and the radiologist believes these could be benign.    Additionally, there is a decrease in the size of some of your lymph nodes, which appear to be stable. This suggests they are likely reactive rather than pathologic.    There is some indication of fatty infiltration in the liver. This could potentially be improved with dietary changes, such as reducing carbohydrates and trans fats.    The recommendation is to repeat the CT scan annually to monitor these findings.

## 2024-11-08 ENCOUNTER — APPOINTMENT (OUTPATIENT)
Dept: PRIMARY CARE | Facility: CLINIC | Age: 74
End: 2024-11-08
Payer: MEDICARE

## 2024-11-08 VITALS
DIASTOLIC BLOOD PRESSURE: 60 MMHG | HEART RATE: 82 BPM | BODY MASS INDEX: 33.54 KG/M2 | WEIGHT: 230.4 LBS | SYSTOLIC BLOOD PRESSURE: 128 MMHG | OXYGEN SATURATION: 95 %

## 2024-11-08 DIAGNOSIS — H53.2 DOUBLE VISION: ICD-10-CM

## 2024-11-08 DIAGNOSIS — K42.9 UMBILICAL HERNIA WITHOUT OBSTRUCTION AND WITHOUT GANGRENE: ICD-10-CM

## 2024-11-08 DIAGNOSIS — M25.512 CHRONIC LEFT SHOULDER PAIN: Primary | ICD-10-CM

## 2024-11-08 DIAGNOSIS — G89.29 CHRONIC LEFT SHOULDER PAIN: Primary | ICD-10-CM

## 2024-11-08 ASSESSMENT — PATIENT HEALTH QUESTIONNAIRE - PHQ9
SUM OF ALL RESPONSES TO PHQ9 QUESTIONS 1 AND 2: 0
1. LITTLE INTEREST OR PLEASURE IN DOING THINGS: NOT AT ALL
1. LITTLE INTEREST OR PLEASURE IN DOING THINGS: NOT AT ALL
SUM OF ALL RESPONSES TO PHQ9 QUESTIONS 1 AND 2: 0
2. FEELING DOWN, DEPRESSED OR HOPELESS: NOT AT ALL
2. FEELING DOWN, DEPRESSED OR HOPELESS: NOT AT ALL

## 2024-11-08 NOTE — PROGRESS NOTES
Subjective   Patient ID: Sae Dan is a 74 y.o. male who presents for Follow-up.    Past Medical, Surgical, and Family History reviewed and updated in chart.    Reviewed all medications by prescribing practitioner or clinical pharmacist (such as prescriptions, OTCs, herbal therapies and supplements) and documented in the medical record.    HPI  1. Left Shoulder Pain:     - Dilip has been experiencing left shoulder pain and has been undergoing physical therapy for two months without improvement in strength or range of motion.     - He reports significant pain at the insertion of the supraspinatus in the left upper extremity.     - The issue began several months ago after a fall, during which he felt something tear in his left shoulder while trying to brace himself.     - Dilip is concerned about a possible muscle tear and is seeking guidance on the next steps for evaluation and treatment.    2. Double Vision:     - Dilip previously had double vision after his thyroidectomy, which was resolved by an ophthalmologist.     - He attempted to make a follow-up appointment with Dr. Sb Dockery but found he is no longer available.     - Dilip reached out to Dr. Parmar, his former endocrinologist, for a referral to an ophthalmologist experienced with Graves' disease but has not received a reply.     - He has scheduled an appointment with Dr. Lucas and is seeking advice on whether this is the appropriate specialist for his condition.     - Recently, Dilip experienced brief episodes of double vision while using his computer and phone, raising concerns about a potential connection to his Graves' disease.    3. Umbilical Hernia:     - Dilip reports that his umbilical hernia is enlarging and he is now ready to have it surgically repaired.     - He is requesting a referral for surgical consultation and repair.    Review of Systems  All pertinent positive symptoms are included in the history of present illness.    All other  systems have been reviewed and are negative and noncontributory to this patient's current ailments.    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    Personal history of other diseases of the circulatory system 10/04/2013    History of arteriosclerotic cardiovascular disease    Personal history of other endocrine, nutritional and metabolic disease     History of Graves' disease    Personal history of other endocrine, nutritional and metabolic disease     History of thyroid disease    Shortness of breath 11/26/2018    Shortness of breath on exertion    Unspecified disorder of refraction 05/15/2014    Refractive error    Unspecified ptosis of left eyelid 05/29/2014    Ptosis of left eyelid    Unspecified ptosis of right eyelid 05/29/2014    Ptosis of right eyelid     Past Surgical History:   Procedure Laterality Date    CT ANGIO CORONARY ART WITH HEARTFLOW IF SCORE >30%  11/8/2018    CT HEART CORONARY ANGIOGRAM 11/8/2018 [facility] CLINICAL LEGAC[facility]    MR HEAD ANGIO WO IV CONTRAST  11/17/2012    MR HEAD ANGIO WO IV CONTRAST 11/17/2012 [facility]    MR NECK ANGIO WO IV CONTRAST  11/17/2012    MR NECK ANGIO WO IV CONTRAST 11/17/2012 [facility]    OTHER SURGICAL HISTORY  08/09/2018    Endarterectomy Carotid Artery    TOTAL THYROIDECTOMY  01/09/2015    Thyroid Surgery Total Thyroidectomy     Social History     Tobacco Use    Smoking status: Former     Types: Cigarettes     Passive exposure: Past     Smokeless tobacco: Never   Substance Use Topics    Alcohol use: Yes    Drug use: Never     Family History   Problem Relation Name Age of Onset    Cancer Mother      Cataracts Father      Cancer Father      Glaucoma Other grandmother      Immunization History   Administered Date(s) Administered    Flu vaccine, trivalent, preservative free, HIGH-DOSE, age 65y+ (Fluzone) 12/09/2014, 09/16/2024    Influenza, Seasonal, Quadrivalent, Adjuvanted 11/03/2023    Influenza, seasonal, injectable 12/21/2016, 12/21/2022    Pfizer Purple Cap SARS-CoV-2 03/08/2021, 04/05/2021    Pneumococcal, Unspecified 12/21/2016, 08/08/2018     Current Outpatient Medications   Medication Instructions    aspirin 81 mg, oral, Daily    levothyroxine (SYNTHROID, LEVOXYL) 112 mcg, oral, Daily    olmesartan (BENICAR) 20 mg, oral, Daily    pravastatin (PRAVACHOL) 80 mg, oral, Daily, as directed     Allergies   Allergen Reactions    Morphine Rash       Objective   Vitals:    11/08/24 1017   BP: 128/60   BP Location: Left arm   Patient Position: Sitting   BP Cuff Size: Large adult   Pulse: 82   SpO2: 95%   Weight: 105 kg (230 lb 6.4 oz)     Body mass index is 33.54 kg/m².    BP Readings from Last 3 Encounters:   11/08/24 128/60   09/16/24 122/62   08/28/24 167/77      Wt Readings from Last 3 Encounters:   11/08/24 105 kg (230 lb 6.4 oz)   09/16/24 105 kg (232 lb)   08/28/24 105 kg (230 lb 9.6 oz)        No visits with results within 1 Month(s) from this visit.   Latest known visit with results is:   Lab on 10/01/2024   Component Date Value    Glucose 10/01/2024 99     Sodium 10/01/2024 139     Potassium 10/01/2024 4.9     Chloride 10/01/2024 108 (H)     Bicarbonate 10/01/2024 21     Anion Gap 10/01/2024 15     Urea Nitrogen 10/01/2024 42 (H)     Creatinine 10/01/2024 1.75 (H)     eGFR 10/01/2024 40 (L)     Calcium 10/01/2024 9.3     Phosphorus 10/01/2024 3.7     Albumin 10/01/2024 4.4      Physical Exam  CONSTITUTIONAL - well nourished, well developed,  looks like stated age, in no acute distress, not ill-appearing, and not tired appearing  SKIN - normal skin color and pigmentation, normal skin turgor without rash, lesions, or nodules visualized  HEAD - no trauma, normocephalic  EYES - pupils are equal and reactive to light, extraocular muscles are intact, and normal external exam  ABDOMEN - no organomegaly, soft, nontender, nondistended, normal bowel sounds, no guarding/rebound/rigidity, negative McBurney sign and negative Saez sign; large umbilicus bulge which is reducible slightly tender, no discharge or signs of infection or obstruction  EXTREMITIES - no obvious or evident edema, no obvious or evident deformities; left shoulder with decreased range of motion, rotator cuff strength 3/5 against resistance, unable to lift left upper extremity above at 90 degree plane actively; producible tenderness at the insertion of the supraspinatus in the left upper extremity  NEUROLOGICAL - normal gait, normal balance, normal motor, no ataxia, alert, oriented and no focal signs  PSYCHIATRIC - alert, pleasant and cordial, age-appropriate    Assessment/Plan   Problem List Items Addressed This Visit       Umbilical hernia without obstruction and without gangrene     I agree that it is time to have this repaired, so I will send you to a surgeon for evaluation and ultimately surgical management         Relevant Orders    Referral to General Surgery    Chronic left shoulder pain - Primary     I am concerned about rotator cuff tear, so since you have failed physical therapy, we need to get an MRI of the area to further evaluate after which further recommendations can be made    Due to the fact you have significant claustrophobia in MRI units, I provided you with the phone number and location to the [facility] MRI unit of which I am aware         Relevant Orders    MR shoulder left wo IV contrast    Double vision     Please keep appointment with ophthalmologist for early  November, and if further specialization is needed, Dr. Lucas will certainly recommend you to see

## 2024-11-08 NOTE — ASSESSMENT & PLAN NOTE
I agree that it is time to have this repaired, so I will send you to a surgeon for evaluation and ultimately surgical management

## 2024-11-08 NOTE — ASSESSMENT & PLAN NOTE
Please keep appointment with ophthalmologist for early November, and if further specialization is needed, Dr. Lucas will certainly recommend you to see

## 2024-11-08 NOTE — ASSESSMENT & PLAN NOTE
I am concerned about rotator cuff tear, so since you have failed physical therapy, we need to get an MRI of the area to further evaluate after which further recommendations can be made    Due to the fact you have significant claustrophobia in MRI units, I provided you with the phone number and location to the [facility] MRI unit of which I am aware

## 2024-11-21 ENCOUNTER — APPOINTMENT (OUTPATIENT)
Dept: OPHTHALMOLOGY | Facility: CLINIC | Age: 74
End: 2024-11-21
Payer: MEDICARE

## 2024-11-21 DIAGNOSIS — H52.01 HYPEROPIA OF RIGHT EYE: ICD-10-CM

## 2024-11-21 DIAGNOSIS — H52.4 PRESBYOPIA: ICD-10-CM

## 2024-11-21 DIAGNOSIS — H52.223 REGULAR ASTIGMATISM OF BOTH EYES: ICD-10-CM

## 2024-11-21 DIAGNOSIS — E05.00 GRAVES' DISEASE: Primary | ICD-10-CM

## 2024-11-21 DIAGNOSIS — H25.13 AGE-RELATED NUCLEAR CATARACT OF BOTH EYES: ICD-10-CM

## 2024-11-21 DIAGNOSIS — Z86.69 HX OF DIPLOPIA: ICD-10-CM

## 2024-11-21 DIAGNOSIS — H52.12 MYOPIA OF LEFT EYE: ICD-10-CM

## 2024-11-21 DIAGNOSIS — H53.8 BLURRED VISION: ICD-10-CM

## 2024-11-21 PROCEDURE — 92004 COMPRE OPH EXAM NEW PT 1/>: CPT | Performed by: OPHTHALMOLOGY

## 2024-11-21 PROCEDURE — 92015 DETERMINE REFRACTIVE STATE: CPT | Performed by: OPHTHALMOLOGY

## 2024-11-21 ASSESSMENT — REFRACTION_MANIFEST
OD_AXIS: 090
OS_AXIS: 046
OS_SPHERE: -0.25
OS_AXIS: 034
OS_CYLINDER: -0.75
OS_CYLINDER: -0.25
OD_ADD: +2.50
OS_ADD: +2.50
OD_SPHERE: +1.50
OD_AXIS: 086
METHOD_AUTOREFRACTION: 1
OD_SPHERE: +1.75
OD_CYLINDER: -0.50
OD_CYLINDER: -1.25
OS_SPHERE: -0.25

## 2024-11-21 ASSESSMENT — CONF VISUAL FIELD
OD_INFERIOR_TEMPORAL_RESTRICTION: 0
OS_SUPERIOR_TEMPORAL_RESTRICTION: 0
OD_SUPERIOR_NASAL_RESTRICTION: 0
OS_NORMAL: 1
OD_NORMAL: 1
OS_INFERIOR_TEMPORAL_RESTRICTION: 0
OS_INFERIOR_NASAL_RESTRICTION: 0
OD_INFERIOR_NASAL_RESTRICTION: 0
OD_SUPERIOR_TEMPORAL_RESTRICTION: 0
OS_SUPERIOR_NASAL_RESTRICTION: 0

## 2024-11-21 ASSESSMENT — ENCOUNTER SYMPTOMS: EYES NEGATIVE: 1

## 2024-11-21 ASSESSMENT — TONOMETRY
IOP_METHOD: GOLDMANN APPLANATION
OD_IOP_MMHG: 14
OS_IOP_MMHG: 13

## 2024-11-21 ASSESSMENT — VISUAL ACUITY
METHOD: SNELLEN - LINEAR
OS_SC: 20/20
CORRECTION_TYPE: GLASSES
OD_BAT_MED: 20/60
OD_SC: 20/30
OS_BAT_MED: 20/50
OD_SC: J2
OS_SC: J2

## 2024-11-21 ASSESSMENT — CUP TO DISC RATIO
OD_RATIO: 0.35
OS_RATIO: 0.35

## 2024-11-21 NOTE — PROGRESS NOTES
Assessment/Plan   Diagnoses and all orders for this visit:  Graves' disease  -status post (s/p) thyroidectomy- on Synthroid; pt states his thyroid hormone levels are wnl  -status post (s/p) radiation therapy and Prednisone therapy in the past  -doing well now  -no evidence of proptosis or EOM restriction at the present time    Blurred vision in right eye  Improved with refraction today  -pt was advised his vision was corrected to 20/20 in each eye with refraction today.    Age-related nuclear cataract of both eyes  -right eye worse than left  -vision can be corrected with manifest refraction (MR) today     Hx of diplopia-intermittent  -2 episodes of vertical diplopia in the last month or so, lasting about 30-40 seconds  -denies any pain, watering or burning of eyes    Refer to [facility] for Graves evaluation and management    Hyperopia of right eye  Myopia of left eye  Regular astigmatism of both eyes  Presbyopia  Refractive error  -give Rx for new glasses- pt states he will not wear glasses because he cannot walk down the stairs with glasses;  Recommended to try SV distance and SV reading glasses if he is not tolerating bifocals.      Return for a dilated exam in   12  months or sooner if having any problems

## 2024-11-22 ENCOUNTER — HOSPITAL ENCOUNTER (OUTPATIENT)
Dept: RADIOLOGY | Facility: CLINIC | Age: 74
End: 2024-11-22
Payer: MEDICARE

## 2024-11-25 ENCOUNTER — HOSPITAL ENCOUNTER (OUTPATIENT)
Dept: RADIOLOGY | Facility: CLINIC | Age: 74
Discharge: HOME | End: 2024-11-25
Payer: MEDICARE

## 2024-11-25 ENCOUNTER — APPOINTMENT (OUTPATIENT)
Facility: CLINIC | Age: 74
End: 2024-11-25
Payer: MEDICARE

## 2024-11-25 VITALS — BODY MASS INDEX: 34.07 KG/M2 | HEIGHT: 69 IN | WEIGHT: 230 LBS

## 2024-11-25 DIAGNOSIS — G89.29 CHRONIC LEFT SHOULDER PAIN: ICD-10-CM

## 2024-11-25 DIAGNOSIS — M25.512 CHRONIC LEFT SHOULDER PAIN: ICD-10-CM

## 2024-11-25 DIAGNOSIS — M25.512 LEFT SHOULDER PAIN, UNSPECIFIED CHRONICITY: ICD-10-CM

## 2024-11-25 DIAGNOSIS — M75.42 IMPINGEMENT SYNDROME OF LEFT SHOULDER: ICD-10-CM

## 2024-11-25 DIAGNOSIS — S43.432A LABRAL TEAR OF SHOULDER, LEFT, INITIAL ENCOUNTER: ICD-10-CM

## 2024-11-25 DIAGNOSIS — M75.122 COMPLETE TEAR OF LEFT ROTATOR CUFF, UNSPECIFIED WHETHER TRAUMATIC: ICD-10-CM

## 2024-11-25 DIAGNOSIS — M75.22 BICEPS TENDINITIS OF LEFT SHOULDER: Primary | ICD-10-CM

## 2024-11-25 DIAGNOSIS — M75.102 TEAR OF LEFT ROTATOR CUFF, UNSPECIFIED TEAR EXTENT, UNSPECIFIED WHETHER TRAUMATIC: ICD-10-CM

## 2024-11-25 PROCEDURE — 73030 X-RAY EXAM OF SHOULDER: CPT | Mod: LT

## 2024-11-25 PROCEDURE — 1160F RVW MEDS BY RX/DR IN RCRD: CPT | Performed by: ORTHOPAEDIC SURGERY

## 2024-11-25 PROCEDURE — 1125F AMNT PAIN NOTED PAIN PRSNT: CPT | Performed by: ORTHOPAEDIC SURGERY

## 2024-11-25 PROCEDURE — 73030 X-RAY EXAM OF SHOULDER: CPT | Mod: LEFT SIDE

## 2024-11-25 PROCEDURE — 1036F TOBACCO NON-USER: CPT | Performed by: ORTHOPAEDIC SURGERY

## 2024-11-25 PROCEDURE — 1159F MED LIST DOCD IN RCRD: CPT | Performed by: ORTHOPAEDIC SURGERY

## 2024-11-25 PROCEDURE — 99204 OFFICE O/P NEW MOD 45 MIN: CPT | Performed by: ORTHOPAEDIC SURGERY

## 2024-11-25 PROCEDURE — 1123F ACP DISCUSS/DSCN MKR DOCD: CPT | Performed by: ORTHOPAEDIC SURGERY

## 2024-11-25 PROCEDURE — 1157F ADVNC CARE PLAN IN RCRD: CPT | Performed by: ORTHOPAEDIC SURGERY

## 2024-11-25 ASSESSMENT — PAIN - FUNCTIONAL ASSESSMENT: PAIN_FUNCTIONAL_ASSESSMENT: 0-10

## 2024-11-25 ASSESSMENT — PAIN SCALES - GENERAL: PAINLEVEL_OUTOF10: 1

## 2024-11-25 NOTE — PROGRESS NOTES
History of Present Illness:    74 y.o. male presents to clinic today for his left shoulder.  Patient has had over 3 months of left shoulder pain.  He states in mid August he slipped on a set of stairs and caught himself with his left arm.  He states his arm was hung on the railing.  He states that he felt something pull at that time.  Since then he has been having left shoulder pain.  He notes that the lateral shoulder pain.  He states his pain is sharp.  He states that his pain is worse with activity and better with rest.  He notes overhead movements are particularly difficult.  He has pain at night that keeps him up.  He states that he did 2 months of physical therapy.  Patient had 12-16 sessions of physical therapy without any significant improvement.  he takes Tylenol as needed for pain.  He is also using ice and heat therapy as needed.  Patient has not had any surgeries on his left shoulder previously.  He does have some baseline neck pain and occasionally gets radicular symptoms.        Review of Systems:    GENERAL: Negative  GI: Negative  MUSCULOSKELETAL: See HPI  SKIN: Negative  NEURO:  Negative     Physical Exam:  Patients' self reported past medical history, medications, allergies, surgical history, family and social history as well as a 10 point review of systems has been documented in the new patient intake form and scanned into the patient's electronic medical record.  The intake form was reviewed by Dr Stratton during the office visit and signed by Dr. Stratton and the patient.  Pertinent findings are documented in the HPI.    General Multi-System Physical Exam:  Constitutional  General appearance:  Alert, oriented, and in no acute distress.  Well developed, well nourished.  Head and Face  Head and face:  Normocephalic and atraumatic.  Ears, Nose, Mouth, and Throat  External inspection of ears and nose: Normal.  Eyes:  Pupils are equal and round.  Neck  Neck:  no neck mass was  observed.  Pulmonary  Respiratory effort:  no respiratory distress.  Cardiovascular  Intact distal pulses.  Lymphatic  Palpation of lymph nodes in the affected extremity:  Normal.  Skin  Skin and subcutaneous tissue:  Normal skin color and pigmentation.  Normal skin turgor.  No rashes.  Neurologic  Sensation:  normal to light touch.  Psychiatric  Judgement and insight:  Intact.  Mood and affect:  Normal.  Musculoskeletal    Shoulder:  Examination of left shoulder demonstrates no tenderness to palpation to the sternoclavicular joint.  No tenderness to the AC Joint.  Right shoulder range of motion is 120 degrees of forward flexion abduction, 50 degrees of external rotation and internally rotates to L1 .range of motion of the left shoulder is 120 degrees of flexion abduction, 50 degrees of external rotation, and sacrum of internal rotation.  Positive biceps ,  Postive Neers, Trimble.  Positive Jobes testing with pain and x-rays of the patient were ordered by Dr Stratton and obtained today.  Dr Stratton personally reviewed the results of the x-rays.    In addition, Dr Stratton independently interpreted the patient's x-rays (performed by the Radiology department) by viewing the x-ray images and this is Dr. Stratton's personal interpretation:     Weakness.     Elbow and wrist motion were not irritable.  Radial pulse 2+ and palpable. SILT. UE is NVI.     Imaging:   X-rays of the patient were ordered by Dr Stratton and obtained today.  Dr Stratton personally reviewed the results of the x-rays.    In addition, Dr Stratton independently interpreted the patient's x-rays (performed by the Radiology department) by viewing the x-ray images and this is Dr. Stratton's personal interpretation:     Left shoulder  X-rays of the left shoulder demonstrates well-maintained glenohumeral joint space.  No acute fractures or dislocations seen on x-rays today.    Assessment:   Left shoulder pain related to rotator cuff tear following a fall 3 months  "ago.    Plan:  Discussed further management with patient today.  At this time we discussed there is concern for a rotator cuff tear left shoulder.  Patient has had over 3 months of left shoulder pain.  He did have a traumatic injury that is concerning for rotator cuff tear and continues to be symptomatic after extensive treatment..  Patient is also failed extensive conservative management with oral medication and 12-16 sessions of physical therapy.  At this time would recommend moving forward with an MRI to evaluate for rotator cuff tearing and biceps tearing.  Will follow-up with the patient once he is had an MRI completed of the left shoulder for further recommendations pending his results.      This patient had a traumatic injury to their shoulder and based upon their history and physical examination, I am concerned that they have a traumatic full-thickness rotator cuff tear of their shoulder.  Excellent literature has proven over and over again that traumatic full-thickness rotator cuff tears do much better with acute surgical repair and have much worse results with delayed surgery.  One such piece of literature includes the publication, \"Early Repair of Traumatic Rotator Cuff Tears Improves Functional Outcomes\" by Rony Murdock et al., published in the Journal of Shoulder and Elbow Surgery, year 2021, 30th Edition, Pages 2103-8549.  In this publication, they clearly show that patients who have traumatic full-thickness rotator cuff tears do much better with surgical repair of the rotator cuff within 3 weeks of their traumatic injury.  When surgery was performed greater than 4 months after the traumatic injury, outcomes from the surgery are much worse.  In their publication, they specifically state, \"It is important for all providers to be aware that surgical timing in traumatic rotator cuff tears is important and early MRI diagnosis is also important.  In our experience, the mean time to initial presentation to " "an orthopedic surgeon was nearly 2 months after the injury.  Owing to the delayed presentation to an orthopedic surgeon and a gradual decline in outcomes that occurs with a delayed operation, it is imperative for providers to obtain MRI scans expediently to allow for the best outcomes.\"    As a result of this publication and other similar publications that prove traumatic full-thickness rotator cuff tears do much better with surgery within 3 weeks of the injury and have much worse results when surgery is delayed more than 4 months after the injury, we must obtain a  MRI of this patient's shoulder to evaluate for full-thickness rotator cuff tear.  The patient had a traumatic injury and if they have a full-thickness rotator cuff tear, excellent research shows much better outcomes with surgery as quick as possible after the injury.  We will therefore order a MRI of the patient's shoulder to evaluate for full-thickness rotator cuff tear and see the patient back as soon as the MRI has been completed to discuss the results.                This patient has a new, acute, previously-undiagnosed problem of their affected extremity.  We will begin treatment as listed here and monitor treatment based upon their progression and response to treatment.  Due to the fact that we are just beginning treatment on this issue, this is currently considered an undiagnosed new problem with uncertain prognosis.  The exact diagnosis and their prognosis will depend upon their response to treatment and progression of their condition as time progresses.    Due to this patient's condition, they are at a moderate risk of morbidity from additional diagnostic testing / treatment.    "

## 2024-11-27 ENCOUNTER — LAB (OUTPATIENT)
Dept: LAB | Facility: LAB | Age: 74
End: 2024-11-27
Payer: MEDICARE

## 2024-11-27 DIAGNOSIS — C61 MALIGNANT NEOPLASM OF PROSTATE (MULTI): ICD-10-CM

## 2024-11-27 LAB — PSA SERPL-MCNC: 0.23 NG/ML

## 2024-11-27 PROCEDURE — 84153 ASSAY OF PSA TOTAL: CPT

## 2024-11-27 PROCEDURE — 36415 COLL VENOUS BLD VENIPUNCTURE: CPT

## 2024-11-29 ENCOUNTER — APPOINTMENT (OUTPATIENT)
Dept: RADIATION ONCOLOGY | Facility: HOSPITAL | Age: 74
End: 2024-11-29
Payer: MEDICARE

## 2024-12-03 ENCOUNTER — OFFICE VISIT (OUTPATIENT)
Dept: SURGERY | Facility: HOSPITAL | Age: 74
End: 2024-12-03
Payer: MEDICARE

## 2024-12-03 DIAGNOSIS — K43.2 INCISIONAL HERNIA, WITHOUT OBSTRUCTION OR GANGRENE: Primary | ICD-10-CM

## 2024-12-03 DIAGNOSIS — K42.9 UMBILICAL HERNIA WITHOUT OBSTRUCTION AND WITHOUT GANGRENE: ICD-10-CM

## 2024-12-03 PROCEDURE — 1159F MED LIST DOCD IN RCRD: CPT | Performed by: SURGERY

## 2024-12-03 PROCEDURE — 1157F ADVNC CARE PLAN IN RCRD: CPT | Performed by: SURGERY

## 2024-12-03 PROCEDURE — 1036F TOBACCO NON-USER: CPT | Performed by: SURGERY

## 2024-12-03 PROCEDURE — 99214 OFFICE O/P EST MOD 30 MIN: CPT | Performed by: SURGERY

## 2024-12-03 PROCEDURE — 1123F ACP DISCUSS/DSCN MKR DOCD: CPT | Performed by: SURGERY

## 2024-12-03 PROCEDURE — 1126F AMNT PAIN NOTED NONE PRSNT: CPT | Performed by: SURGERY

## 2024-12-03 RX ORDER — CEFAZOLIN SODIUM 2 G/100ML
2 INJECTION, SOLUTION INTRAVENOUS ONCE
OUTPATIENT
Start: 2024-12-03 | End: 2024-12-03

## 2024-12-03 ASSESSMENT — PAIN SCALES - GENERAL: PAINLEVEL_OUTOF10: 0-NO PAIN

## 2024-12-03 NOTE — LETTER
December 3, 2024     Miguel Angel Hendrix DO  [address]  [facility], [address]  [facility] [address]    Patient: Sae Dan   YOB: 1950   Date of Visit: 12/3/2024       Dear Dr. Miguel Angel Hendrix, :    Thank you for referring Sae Dan to me for evaluation. Below are my notes for this consultation.  If you have questions, please do not hesitate to call me. I look forward to following your patient along with you.       Sincerely,     Ilia Brooks MD      CC: No Recipients  ______________________________________________________________________________________    History Of Present Illness  Sae Dan is a 74 y.o. male presenting with discomfort related to a periumbilical bulge.  I had taken his appendix out last summer.  He had a known umbilical hernia at the time.  Occasionally bothers him with exertional activities or coughing.  His primary doctor referred him back to our clinic for definitive management.  History of hypertension, hypothyroidism, hyperlipidemia.  He is retired still, lives with wife in [place].     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   • Personal history of other diseases of the circulatory system 10/04/2013    History of arteriosclerotic cardiovascular disease   • Personal history of other endocrine, nutritional and metabolic disease     History of Graves' disease   • Personal history of other endocrine,  nutritional and metabolic disease     History of thyroid disease   • Shortness of breath 11/26/2018    Shortness of breath on exertion   • Unspecified disorder of refraction 05/15/2014    Refractive error   • Unspecified ptosis of left eyelid 05/29/2014    Ptosis of left eyelid   • Unspecified ptosis of right eyelid 05/29/2014    Ptosis of right eyelid       Surgical History  Past Surgical History:   Procedure Laterality Date   • CT ANGIO CORONARY ART WITH HEARTFLOW IF SCORE >30%  11/8/2018    CT HEART CORONARY ANGIOGRAM 11/8/2018 [facility] CLINICAL LEGACY   • MR HEAD ANGIO WO IV CONTRAST  11/17/2012    MR HEAD ANGIO WO IV CONTRAST 11/17/2012 [facility] EMERGENCY LEGACY   • MR NECK ANGIO WO IV CONTRAST  11/17/2012    MR NECK ANGIO WO IV CONTRAST 11/17/2012 [facility] EMERGENCY LEGACY   • OTHER SURGICAL HISTORY  08/09/2018    Endarterectomy Carotid Artery   • TOTAL THYROIDECTOMY  01/09/2015    Thyroid Surgery Total Thyroidectomy        Social History  He reports that he has quit smoking. His smoking use included cigarettes. He has been exposed to tobacco smoke. He has never used smokeless tobacco. He reports current alcohol use. He reports that he does not use drugs.    Family History  Family History   Problem Relation Name Age of Onset   • Cancer Mother     • Cataracts Father     • Cancer Father     • Glaucoma Other grandmother         Allergies  Morphine    Review of Systems  Constitutional: no weight loss, no fevers, no malaise  HEENT: Double vision  Neck: negative  Pulmonary: no SOB, no cough  CV: no chest pain, otherwise negative  GI: no pain, no diarrhea, no bloody stools, no constipation  : no hematuria, retention.  MS: no aches/pains  Neurologic: negative  Skin: no rashes, lesions  HEME: no bleeding tendency, no bruising  Psych: no mood issues    Physical Exam  General: well appearing, no acute distress, well nourished  HEENT: normal  Neck: supple  Pulmonary: lungs clear to auscultation bilaterally  CV: RR, S1S2, no murmurs.   Pulses palpable and equal.  Good capillary refill  Abdomen: soft, moderate size periumbilical fascial defect, mildly tender  : grossly normal external genitalia  MS: grossly normal  Neurologic: alert and oriented, strength/sensation intact  Skin: non jaundiced, no lesions  Psych: mood appropriate    Last Recorded Vitals  There were no vitals taken for this visit.    Relevant Results           Assessment/Plan      Impression:  Ventral Incisional Hernia  -I carefully reviewed pathophysiology of incisional hernias with patient  -Risks of eventual incarceration/strangulation, worsening symptomatology described  -Rationale for surgical repair outlined  -Techniques of repair described (laparoscopy vs open)  -Risks of surgery addressed (bleeding, infection, bladder/bowel injury, chronic pain syndromes, recurrence, etc)  -Expected recovery timeline conveyed (2-4 weeks of limited activity, work restrictions, need for pain medications, etc)  -Other option would be watchful waiting, avoidance of activity that worsens symptoms  -Patient has indicated to me a verbal understanding of all this information and would like to proceed with robotic preperitoneal mesh repair of the hernia.    -Office will schedule at patient convenience       I spent 30 minutes in the professional and overall care of this patient.      Ilia Brooks MD

## 2024-12-03 NOTE — PROGRESS NOTES
History Of Present Illness  Sae Dan is a 74 y.o. male presenting with discomfort related to a periumbilical bulge.  I had taken his appendix out last summer.  He had a known umbilical hernia at the time.  Occasionally bothers him with exertional activities or coughing.  His primary doctor referred him back to our clinic for definitive management.  History of hypertension, hypothyroidism, hyperlipidemia.  He is retired still, lives with wife in [place].     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    Personal history of other diseases of the circulatory system 10/04/2013    History of arteriosclerotic cardiovascular disease    Personal history of other endocrine, nutritional and metabolic disease     History of Graves' disease    Personal history of other endocrine, nutritional and metabolic disease     History of thyroid disease    Shortness of breath 11/26/2018    Shortness of breath on exertion    Unspecified disorder of refraction 05/15/2014    Refractive error    Unspecified ptosis of left eyelid 05/29/2014    Ptosis of left eyelid    Unspecified ptosis of right eyelid 05/29/2014    Ptosis of right eyelid       Surgical History  Past Surgical History:   Procedure Laterality Date    CT ANGIO CORONARY ART WITH HEARTFLOW IF SCORE >30%  11/8/2018    CT HEART CORONARY ANGIOGRAM 11/8/2018 [facility] CLINICAL LEGACY    MR HEAD ANGIO WO IV CONTRAST  11/17/2012    MR HEAD ANGIO WO  IV CONTRAST 11/17/2012 [facility]    MR NECK ANGIO WO IV CONTRAST  11/17/2012    MR NECK ANGIO WO IV CONTRAST 11/17/2012 [facility]    OTHER SURGICAL HISTORY  08/09/2018    Endarterectomy Carotid Artery    TOTAL THYROIDECTOMY  01/09/2015    Thyroid Surgery Total Thyroidectomy        Social History  He reports that he has quit smoking. His smoking use included cigarettes. He has been exposed to tobacco smoke. He has never used smokeless tobacco. He reports current alcohol use. He reports that he does not use drugs.    Family History  Family History   Problem Relation Name Age of Onset    Cancer Mother      Cataracts Father      Cancer Father      Glaucoma Other grandmother         Allergies  Morphine    Review of Systems  Constitutional: no weight loss, no fevers, no malaise  HEENT: Double vision  Neck: negative  Pulmonary: no SOB, no cough  CV: no chest pain, otherwise negative  GI: no pain, no diarrhea, no bloody stools, no constipation  : no hematuria, retention.  MS: no aches/pains  Neurologic: negative  Skin: no rashes, lesions  HEME: no bleeding tendency, no bruising  Psych: no mood issues    Physical Exam  General: well appearing, no acute distress, well nourished  HEENT: normal  Neck: supple  Pulmonary: lungs clear to auscultation bilaterally  CV: RR, S1S2, no murmurs.  Pulses palpable and equal.  Good capillary refill  Abdomen: soft, moderate size periumbilical fascial defect, mildly tender  : grossly normal external genitalia  MS: grossly normal  Neurologic: alert and oriented, strength/sensation intact  Skin: non jaundiced, no lesions  Psych: mood appropriate    Last Recorded Vitals  There were no vitals taken for this visit.    Relevant Results           Assessment/Plan       Impression:  Ventral Incisional Hernia  -I carefully reviewed pathophysiology of incisional hernias with patient  -Risks of eventual incarceration/strangulation, worsening symptomatology described  -Rationale  for surgical repair outlined  -Techniques of repair described (laparoscopy vs open)  -Risks of surgery addressed (bleeding, infection, bladder/bowel injury, chronic pain syndromes, recurrence, etc)  -Expected recovery timeline conveyed (2-4 weeks of limited activity, work restrictions, need for pain medications, etc)  -Other option would be watchful waiting, avoidance of activity that worsens symptoms  -Patient has indicated to me a verbal understanding of all this information and would like to proceed with robotic preperitoneal mesh repair of the hernia.    -Office will schedule at patient convenience       I spent 30 minutes in the professional and overall care of this patient.      Ilia Brooks MD

## 2024-12-05 ENCOUNTER — APPOINTMENT (OUTPATIENT)
Dept: RADIATION ONCOLOGY | Facility: HOSPITAL | Age: 74
End: 2024-12-05
Payer: MEDICARE

## 2024-12-06 ENCOUNTER — TELEPHONE (OUTPATIENT)
Dept: RADIATION ONCOLOGY | Facility: HOSPITAL | Age: 74
End: 2024-12-06
Payer: MEDICARE

## 2024-12-06 ENCOUNTER — HOSPITAL ENCOUNTER (OUTPATIENT)
Dept: RADIOLOGY | Facility: CLINIC | Age: 74
Discharge: HOME | End: 2024-12-06
Payer: MEDICARE

## 2024-12-06 DIAGNOSIS — M75.102 TEAR OF LEFT ROTATOR CUFF, UNSPECIFIED TEAR EXTENT, UNSPECIFIED WHETHER TRAUMATIC: ICD-10-CM

## 2024-12-06 PROCEDURE — 73221 MRI JOINT UPR EXTREM W/O DYE: CPT | Mod: LT

## 2024-12-09 ASSESSMENT — ENCOUNTER SYMPTOMS
BACK PAIN: 0
COUGH: 0
CONSTIPATION: 0
DIZZINESS: 0
UNEXPECTED WEIGHT CHANGE: 0
FATIGUE: 0
PALPITATIONS: 0
BLOOD IN STOOL: 0
RECTAL PAIN: 0
SHORTNESS OF BREATH: 0
DIFFICULTY URINATING: 0
CHEST TIGHTNESS: 0
ABDOMINAL PAIN: 0
DYSURIA: 0
HEMATURIA: 0
FEVER: 0
WEAKNESS: 0
ANAL BLEEDING: 0
ALLERGIC/IMMUNOLOGIC NEGATIVE: 1
ARTHRALGIAS: 0
PSYCHIATRIC NEGATIVE: 1
JOINT SWELLING: 0
DIARRHEA: 0
FREQUENCY: 0

## 2024-12-09 NOTE — PROGRESS NOTES
Cancer synopsis:  Rad/onc:  Dr. Vázquez/ Mimi CAMACHO    74 year-old male with favorable intermediate risk prostate cancer, cT1c cN0 cMx, PSA 3.38 ng/mL, Favio grade group 2 (3+4 = 7), stage IIB.  The patient has a remote history of Graves' disease treated with retro-orbital radiation 2000 cGy in 10 fractions completed in 2014.  The patient had elevated PSA and MRI of the prostate done shows a 1.2 cm focus in the posterior medial left peripheral zone of the apex consistent with a PI-RADS four lesion with abutment of the capsule, no notable  extraprostatic extension, seminal vesicle invasion or pelvic lymphadenopathy.  The patient underwent transrectal ultrasound-guided biopsy of the prostate  which shows [place] grade group 2 (3+4 = 7) prostate adenocarcinoma with  intraductal carcinoma identified.  Pretreatment MALACHI score 11. IPSS 3.  Quality  of life 1 (pleased).     The radiation planning and treatment procedures were explained to the patient  in advance, and all questions were answered. The benefits and goals of  treatment, options and alternatives, limitations, side effects and risks of  radiation were also explained. The patient provided informed consent.     Technical Summary : Region(s) Treated: Prostate  Radiation Dose Prescribed:  144 Gy, minimal peripheral dose.  Radiation Technique/Machine Used: LDR prostate brachytherapy implant using  I-125. Additional radiation technical details available in our radiation  oncology EMR [employer] and our treatment planning system.     Treatment Area #1  Location : Prostate  Dates : 5/24/2021  Number of Treatments : 1  Dose : 28864 cGy  Modality : I-125 LDR     Other issues:  HLD, HTN, stage 3a CKD, hypothydism, OA, PVD, COPD    History of presenting illness:    Patient ID: [id number]     Sae Dan is a 74 y.o. male who presents for his FIR prostate cancer GS 3+4=7, IPSA <10 now s/p RT.    RT Site: Prostate  RT Date: 05/24/2021 (PSI)  Hormone therapy: No  Hot Flushes:  Denies  Fatigue: Denies  Bone pain: Denies  MALACHI: n/a virtual  ED: Does report erectile loss with aging and RT however not concerned at this time per patient  ED medications: No  IPSS: n/a virtual  Urinary symptoms: Denies hematuria, dysuria. Admits to mild weak stream however resolved as day progresses and is manageable per patient. Denies incomplete bladder emptying, urine leakage or urgency.  Urinary Medications: No  Rectal bleeding: Denies  Colonoscopy:  03/2020: normal, next in 10yrs  Other systems: Denies SOB, CP or fever. Recently had appendix removed after bursting per patient. Recovering well.    Review of systems:  Review of Systems   Constitutional:  Negative for fatigue, fever and unexpected weight change.   Respiratory:  Negative for cough, chest tightness and shortness of breath.    Cardiovascular:  Negative for chest pain, palpitations and leg swelling.   Gastrointestinal:  Negative for abdominal pain, anal bleeding, blood in stool, constipation, diarrhea and rectal pain.   Endocrine: Negative for cold intolerance, heat intolerance and polyuria.   Genitourinary:  Negative for decreased urine volume, difficulty urinating, dysuria, frequency, hematuria and urgency.   Musculoskeletal:  Negative for arthralgias, back pain, gait problem and joint swelling.   Skin: Negative.    Allergic/Immunologic: Negative.    Neurological:  Negative for dizziness, syncope and weakness.   Psychiatric/Behavioral: Negative.       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    Personal history of other diseases of the circulatory system 10/04/2013    History of arteriosclerotic cardiovascular disease    Personal history of other endocrine, nutritional and metabolic disease     History of Graves' disease    Personal history of other endocrine, nutritional and metabolic disease     History of thyroid disease    Shortness of breath 11/26/2018    Shortness of breath on exertion    Unspecified disorder of refraction 05/15/2014    Refractive error    Unspecified ptosis of left eyelid 05/29/2014    Ptosis of left eyelid    Unspecified ptosis of right eyelid 05/29/2014    Ptosis of right eyelid      Surgical/family history  Family History   Problem Relation Name Age of Onset    Cancer Mother      Cataracts Father      Cancer Father      Glaucoma Other grandmother       Past Surgical History:   Procedure Laterality Date    CT ANGIO CORONARY ART WITH HEARTFLOW IF SCORE >30%  11/8/2018    CT HEART CORONARY ANGIOGRAM 11/8/2018 [facility] CLINICAL LEGACY    MR HEAD ANGIO WO IV CONTRAST  11/17/2012    MR HEAD ANGIO WO IV CONTRAST 11/17/2012 [facility]    MR NECK ANGIO WO IV CONTRAST  11/17/2012    MR NECK ANGIO WO IV CONTRAST 11/17/2012 [facility]    OTHER SURGICAL HISTORY  08/09/2018    Endarterectomy Carotid Artery    TOTAL THYROIDECTOMY  01/09/2015    Thyroid Surgery Total Thyroidectomy      Social History  Tobacco Use: Medium Risk (12/3/2024)    Patient History     Smoking Tobacco Use: Former     Smokeless Tobacco Use: Never     Passive Exposure: Past       Current med list:  Current Outpatient Medications   Medication Instructions    aspirin 81 mg, Daily    levothyroxine (SYNTHROID, LEVOXYL) 112 mcg, oral, Daily    olmesartan (BENICAR) 20 mg, oral, Daily    pravastatin (PRAVACHOL) 80 mg, oral, Daily, as directed      Last recorded vital:  N/a virtual    Physical exam  N/a virtual    Pertinent  labs:  Prostate Specific AG   Date/Time Value Ref Range Status   11/27/2024 01:04 PM 0.23 <=4.00 ng/mL Final     Dx:  Problem List Items Addressed This Visit    None  PSA of 0.23 was reviewed and is stable. Review of latent SE including rectal bleeding, hematuria, urinary strictures, ED where reviewed as well as how to contact office if s/s present. Does report erectile loss with aging and RT however not concerned at this time per patient. Denies latent SE. NCCN guidelines where reviewed and routine FUV of every 3m for first year and every 6m for four years for a total of five years was discussed. Patient verbalized understanding.      PLAN:  FUV 6m virtually  Labs PSA in 6m  Imaging none  FUV other providers: PCP for routine evals    Please contact office with any concerns:  Chon Perez CNP  [phone]    I performed this visit using realtime telehealth tools, including an audio/video OR telephone connection between patient’s name and location Sae Dan and Chon Le CNP.    2. POS 10: Telehealth provided in patient's home.  o Patient is located in their home (which is a location other than a hospital or other  facility where the patient receives care in a private residence) when receiving  health services or health related services through telecommunication technology.

## 2024-12-10 ENCOUNTER — HOSPITAL ENCOUNTER (OUTPATIENT)
Dept: RADIATION ONCOLOGY | Facility: HOSPITAL | Age: 74
Setting detail: RADIATION/ONCOLOGY SERIES
Discharge: HOME | End: 2024-12-10
Payer: MEDICARE

## 2024-12-10 DIAGNOSIS — C61 MALIGNANT NEOPLASM OF PROSTATE (MULTI): Primary | ICD-10-CM

## 2024-12-10 PROCEDURE — 99213 OFFICE O/P EST LOW 20 MIN: CPT | Mod: 95

## 2024-12-10 PROCEDURE — 99213 OFFICE O/P EST LOW 20 MIN: CPT

## 2024-12-23 ENCOUNTER — TELEPHONE (OUTPATIENT)
Dept: ORTHOPEDIC SURGERY | Facility: CLINIC | Age: 74
End: 2024-12-23

## 2024-12-23 ENCOUNTER — APPOINTMENT (OUTPATIENT)
Facility: CLINIC | Age: 74
End: 2024-12-23
Payer: MEDICARE

## 2024-12-23 VITALS — BODY MASS INDEX: 32.93 KG/M2 | WEIGHT: 230 LBS | HEIGHT: 70 IN

## 2024-12-23 DIAGNOSIS — S43.432A LABRAL TEAR OF SHOULDER, LEFT, INITIAL ENCOUNTER: ICD-10-CM

## 2024-12-23 DIAGNOSIS — M75.122 COMPLETE TEAR OF LEFT ROTATOR CUFF, UNSPECIFIED WHETHER TRAUMATIC: Primary | ICD-10-CM

## 2024-12-23 DIAGNOSIS — M75.22 BICEPS TENDINITIS OF LEFT SHOULDER: ICD-10-CM

## 2024-12-23 DIAGNOSIS — S46.012D TRAUMATIC COMPLETE TEAR OF LEFT ROTATOR CUFF, SUBSEQUENT ENCOUNTER: ICD-10-CM

## 2024-12-23 DIAGNOSIS — M75.42 IMPINGEMENT SYNDROME OF LEFT SHOULDER: ICD-10-CM

## 2024-12-23 PROBLEM — S46.012A TRAUMATIC COMPLETE TEAR OF LEFT ROTATOR CUFF: Status: ACTIVE | Noted: 2024-12-23

## 2024-12-23 PROCEDURE — 1157F ADVNC CARE PLAN IN RCRD: CPT | Performed by: ORTHOPAEDIC SURGERY

## 2024-12-23 PROCEDURE — 1036F TOBACCO NON-USER: CPT | Performed by: ORTHOPAEDIC SURGERY

## 2024-12-23 PROCEDURE — 99215 OFFICE O/P EST HI 40 MIN: CPT | Performed by: ORTHOPAEDIC SURGERY

## 2024-12-23 PROCEDURE — 1123F ACP DISCUSS/DSCN MKR DOCD: CPT | Performed by: ORTHOPAEDIC SURGERY

## 2024-12-23 PROCEDURE — 1159F MED LIST DOCD IN RCRD: CPT | Performed by: ORTHOPAEDIC SURGERY

## 2024-12-23 PROCEDURE — 1125F AMNT PAIN NOTED PAIN PRSNT: CPT | Performed by: ORTHOPAEDIC SURGERY

## 2024-12-23 PROCEDURE — 1160F RVW MEDS BY RX/DR IN RCRD: CPT | Performed by: ORTHOPAEDIC SURGERY

## 2024-12-23 ASSESSMENT — PAIN SCALES - GENERAL: PAINLEVEL_OUTOF10: 2

## 2024-12-23 ASSESSMENT — PAIN - FUNCTIONAL ASSESSMENT: PAIN_FUNCTIONAL_ASSESSMENT: 0-10

## 2024-12-23 NOTE — TELEPHONE ENCOUNTER
Left message for patient just confirmed patients insurance will carry over into 2025 with no changes. If there are changes I need insurance name id# and group#.

## 2024-12-23 NOTE — PROGRESS NOTES
74-year-old male who had a traumatic injury to the left shoulder in August 2024 presents with continued pain in the left shoulder.  Patient continues having significant pain in the left shoulder.  He is unable to do activities of daily living without significant pain in the left shoulder and has significant night pain.  He is already had 12-16 sessions of physical therapy with no improvements anti-inflammatories with no improvements.  Pain is worse activity better with rest    Patients' self reported past medical history, medications, allergies, surgical history, family and social history as well as a 10 point review of systems has been documented in the new patient intake form and scanned into the patient's electronic medical record.  The intake form was reviewed by Dr Stratton during the office visit and signed by Dr. Stratton and the patient.  Pertinent findings are documented in the HPI.    General Multi-System Physical Exam:  Constitutional  General appearance:  Alert, oriented, and in no acute distress.  Well developed, well nourished.  Head and Face  Head and face:  Normocephalic and atraumatic.  Ears, Nose, Mouth, and Throat  External inspection of ears and nose: Normal.  Eyes:  Pupils are equal and round.  Neck  Neck:  no neck mass was observed.  Pulmonary  Respiratory effort:  no respiratory distress.  Cardiovascular  Intact distal pulses.  Lymphatic  Palpation of lymph nodes in the affected extremity:  Normal.  Skin  Skin and subcutaneous tissue:  Normal skin color and pigmentation.  Normal skin turgor.  No rashes.  Neurologic  Sensation:  normal to light touch.  Psychiatric  Judgement and insight:  Intact.  Mood and affect:  Normal.  Musculoskeletal  Left shoulder is 100 degrees of active abduction forward flexion 110 degrees of passive abduction forward flexion 45 degrees of external rotation internal rotates to sacrum he has negative tenderness over the acromioclavicular joint negative crossarm test  positive biceps tenderness with tenderness in the bicipital groove positive Neer positive Trimble positive Jobes with pain and significant weakness neurovasc intact left upper extremity    Dr Stratton independently interpreted the patient's MRI (performed by the Radiology department) by viewing the MRI images and this is Dr. Stratton's personal interpretation:     MRI left shoulder showing full-thickness rotator cuff tear of the supraspinatus retracted 1.5 cm with superior subscapularis tearing superior infraspinatus tearing biceps tendon sublux out of the groove labral tearing and impingement      The patient is also having some back pain so we will refer him to physical medicine and rehabilitation to be worked up for his back pain    We had a long discussion regards to his left shoulder.  We talked about his full-thickness rotator cuff tear.  We talked about conservative treatment options but he is already had 12-16 sessions of physical therapy with no improvement and anti-inflammatories with no improvement.  This is a traumatic rotator cuff tear which she sustained in August and research is clearly shown the sooner you get to fix these the better the patient is due.  We talked about a left shoulder scope with rotator cuff repair biceps tenodesis extensive debridement and subacromial decompression with partial acromioplasty.  Patient wants to proceed with surgery so we will schedule surgery on January 21 at [place].  I will see him back for surgery sooner if necessary        I, Dr. Stratton, had a long discussion with the patient / patient's family regarding the risks versus benefits of surgery and all of the treatment options, including nonoperative treatment and surgical treatment options. We discussed the risks of surgery.      The risks of surgery include, but are not limited to, nerve damage, artery damage, muscle damage, risk of bleeding or infection, risk of bone fracture, risk of post-operative stiffness, risk  "of failure of the surgery with possible continued pain or possible need for additional surgery.  Other risks include the risk of hardware pain and possible need for removal of the surgical hardware at another time.   The risks of undergoing anesthesia including, but are not limited to, stroke, heart attack or death.      After a long discussion, the patient / patient's family understood all of the risks versus benefits of surgery and decided that they wanted to proceed with surgery. The patient / guardian signed appropriate surgical consent forms.    Since this patient will be undergoing surgery, I expect the patient to be in significant additional pain in the immediate postoperative period as a result of the surgical procedure. Non-opioid pain medications will not be sufficient to treat this acute, sharp, postoperative pain. Therefore we will be prescribing the patient narcotic pain medications for the immediate postoperative period in addition to numerous non-narcotic pain medications in order to try to help the patient with their post-operative pain.  However, as the pain from surgery subsides, we will work diligently to wean the patient off of all narcotics as quickly as possible.   If the patient requires more narcotic pain medications than we typically see with patients undergoing this surgery, we will refer the patient to a pain management specialist within the first few weeks after their surgical procedure. The patient's OARRS report was reviewed within the last 90 days.    This patient had a traumatic injury to their shoulder and their MRI confirmed a full thickness rotator cuff tear.  Excellent literature has proven over and over again that traumatic full-thickness rotator cuff tears do much better with acute surgical repair and have much worse results with delayed surgery.  One such piece of literature includes the publication, \"Early Repair of Traumatic Rotator Cuff Tears Improves Functional Outcomes\" by " "Rony Murdock et al., published in the Journal of Shoulder and Elbow Surgery, year 2021, 30th Edition, Pages 6503-1323.  In this publication, they clearly show that patients who have traumatic full-thickness rotator cuff tears do much better with surgical repair of the rotator cuff within 3 weeks of their traumatic injury.  When surgery was performed greater than 4 months after the traumatic injury, outcomes from the surgery were much worse.  In their publication, they specifically conclude, \"Earlier surgical repair of traumatic rotator cuff tears results in significantly better functional outcomes.  Although there is a gradual decline in scores as the repair time increases, surgical rotator cuff repair within 3 weeks of injury is the optimal time for repair, with further drop-off in function occurring when surgery was performed greater than 4 months after injury.\"    As a result of this publication and other similar publications that prove traumatic full-thickness rotator cuff tears do much better with surgery within 3 weeks of the injury and have much worse results when surgery is delayed more than 4 months after the injury, we must take this patient to the operating room to perform a shoulder arthroscopy and arthroscopic rotator cuff repair as soon as possible.  The patient had a traumatic full thickness rotator cuff tear  and excellent research shows much better outcomes with surgery as quick as possible after the injury.  We will therefore schedule the patient for a shoulder arthroscopy and arthroscopic rotator cuff repair as soon as possible.        This patient has had a major injury to their affected extremity which has significantly traumatized it.  If surgery is not performed to repair the extremity, it is unlikely the patient's extremity would ever function normally again.  Non-operative treatment would probably fail, leaving this patient with significant disability.  That would classify this problem as " an acute injury that poses a threat to the function of the patient's extremity.     We discussed their surgery at great length.  This is an elective major surgery which is warranted in this case due to the patient's severely injured extremity.  We discussed identified patient and procedural risk factors and how these risk factors may increase the risk of the surgery or influence the outcome of the surgical procedure.   With this procedure, procedural risk factors include, but are not limited to, the risk of infection, bleeding, possible nerve or vasculature injury, nancy fracture, and the possible risk of continued pain or weakness after the operation.  We also discussed how delaying surgery and treating this injury non-operatively may lead to a progression of the injury/disease and high risk of further morbidity.

## 2024-12-24 ENCOUNTER — LAB (OUTPATIENT)
Dept: LAB | Facility: LAB | Age: 74
End: 2024-12-24
Payer: MEDICARE

## 2024-12-24 DIAGNOSIS — S46.012D TRAUMATIC COMPLETE TEAR OF LEFT ROTATOR CUFF, SUBSEQUENT ENCOUNTER: ICD-10-CM

## 2024-12-24 LAB
ANION GAP SERPL CALC-SCNC: 13 MMOL/L (ref 10–20)
BUN SERPL-MCNC: 35 MG/DL (ref 6–23)
CALCIUM SERPL-MCNC: 8.9 MG/DL (ref 8.6–10.3)
CHLORIDE SERPL-SCNC: 108 MMOL/L (ref 98–107)
CO2 SERPL-SCNC: 23 MMOL/L (ref 21–32)
CREAT SERPL-MCNC: 1.46 MG/DL (ref 0.5–1.3)
EGFRCR SERPLBLD CKD-EPI 2021: 50 ML/MIN/1.73M*2
GLUCOSE SERPL-MCNC: 94 MG/DL (ref 74–99)
POTASSIUM SERPL-SCNC: 4.5 MMOL/L (ref 3.5–5.3)
SODIUM SERPL-SCNC: 139 MMOL/L (ref 136–145)

## 2024-12-24 PROCEDURE — 80048 BASIC METABOLIC PNL TOTAL CA: CPT

## 2024-12-31 ENCOUNTER — CLINICAL SUPPORT (OUTPATIENT)
Dept: PREADMISSION TESTING | Facility: HOSPITAL | Age: 74
End: 2024-12-31
Payer: MEDICARE

## 2024-12-31 DIAGNOSIS — K43.2 INCISIONAL HERNIA, WITHOUT OBSTRUCTION OR GANGRENE: ICD-10-CM

## 2024-12-31 NOTE — CPM/PAT NURSE NOTE
CPM/PAT Nurse Note      Name: Sae Dan (Sae Dan)  /Age: 1950/74 y.o.       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    Personal history of other diseases of the circulatory system 10/04/2013    History of arteriosclerotic cardiovascular disease    Personal history of other endocrine, nutritional and metabolic disease     History of Graves' disease    Personal history of other endocrine, nutritional and metabolic disease     History of thyroid disease    Shortness of breath 2018    Shortness of breath on exertion    Unspecified disorder of refraction 05/15/2014    Refractive error    Unspecified ptosis of left eyelid 2014    Ptosis of left eyelid    Unspecified ptosis of right eyelid 2014    Ptosis of right eyelid       Past Surgical History:   Procedure Laterality Date    CT ANGIO CORONARY ART WITH HEARTFLOW IF SCORE >30%  2018    CT HEART CORONARY ANGIOGRAM 2018 [facility] CLINICAL LEGACY    MR HEAD ANGIO WO IV CONTRAST  2012    MR HEAD ANGIO WO IV CONTRAST [facility]    MR NECK ANGIO WO IV CONTRAST  2012    MR NECK ANGIO WO IV CONTRAST [facility]    OTHER SURGICAL HISTORY  2018    Endarterectomy Carotid Artery    TOTAL THYROIDECTOMY  2015    Thyroid Surgery Total Thyroidectomy       Patient  has no history on file for sexual activity.    Family  History   Problem Relation Name Age of Onset    Cancer Mother      Cataracts Father      Cancer Father      Stroke Father      Glaucoma Other grandmother        Allergies   Allergen Reactions    Morphine Rash       Prior to Admission medications    Medication Sig Start Date End Date Taking? Authorizing Provider   aspirin 81 mg EC tablet Take 1 tablet (81 mg) by mouth once daily.    Historical Provider, MD   levothyroxine (Synthroid, Levoxyl) 112 mcg tablet Take 1 tablet (112 mcg) by mouth once daily. 9/20/24 3/19/25  Miguel Angel Hendrix, DO   olmesartan (BENIcar) 20 mg tablet Take 1 tablet (20 mg) by mouth once daily. 9/20/24 3/19/25  Miguel Angel Hendrix, DO   pravastatin (Pravachol) 80 mg tablet Take 1 tablet (80 mg) by mouth once daily. as directed 9/20/24 3/19/25  Miguel Angel Hendrix, DO        PAT ROS     DASI Risk Score      Flowsheet Row Questionnaire Series Submission from 12/3/2024 in [facility] with Generic Provider Tania   Can you take care of yourself (eat, dress, bathe, or use toilet)?  2.75  filed at 12/03/2024 1424   Can you walk a block or two on level ground?  2.75  filed at 12/03/2024 1424   Can you climb a flight of stairs or walk up a hill? 5.5  filed at 12/03/2024 1424   Can you run a short distance? 0  filed at 12/03/2024 1424   Can you do light work around the house like dusting or washing dishes? 2.7  filed at 12/03/2024 1424   Can you do moderate work around the house like vacuuming, sweeping floors or carrying groceries? 3.5  filed at 12/03/2024 1424   Can you do heavy work around the house like scrubbing floors or lifting and moving heavy furniture?  8  filed at 12/03/2024 1424   Can you do yard work like raking leaves, weeding or pushing a mower? 4.5  filed at 12/03/2024 1424   Can you have sexual relations? 0  filed at 12/03/2024 1424   Can you participate in moderate recreational activities like golf, bowling, dancing, doubles tennis or throwing a baseball or football? 6  filed at 12/03/2024 3516    Can you participate in strenous sports like swimming, singles tennis, football, basketball, or skiing? 0  filed at 12/03/2024 1424          Caprini DVT Assessment    No data to display       Modified Frailty Index    No data to display       CHADS2 Stroke Risk  Current as of yesterday        N/A 3 to 100%: High Risk   2 to < 3%: Medium Risk   0 to < 2%: Low Risk     Last Change: N/A          This score determines the patient's risk of having a stroke if the patient has atrial fibrillation.        This score is not applicable to this patient. Components are not calculated.          Revised Cardiac Risk Index    No data to display       Apfel Simplified Score    No data to display       Risk Analysis Index Results This Encounter    No data found in the last 10 encounters.       Stop Bang Score      Flowsheet Row Questionnaire Series Submission from 12/3/2024 in [facility] with Generic Provider Tania   Do you snore loudly? 0  filed at 12/03/2024 1424   Do you often feel tired or fatigued after your sleep? 0  filed at 12/03/2024 1424   Has anyone ever observed you stop breathing in your sleep? 0  filed at 12/03/2024 1424   Do you have or are you being treated for high blood pressure? 1  filed at 12/03/2024 1424   Recent BMI (Calculated) 34  filed at 12/03/2024 1424   Is BMI greater than 35 kg/m2? 0=No  filed at 12/03/2024 1424   Age older than 50 years old? 1=Yes  filed at 12/03/2024 1424   Gender - Male 1=Yes  filed at 12/03/2024 1424          Prodigy: High Risk  Total Score: 20              Prodigy Age Score      Prodigy Gender Score          ARISCAT Score for Postoperative Pulmonary Complications    No data to display       Cardona Perioperative Risk for Myocardial Infarction or Cardiac Arrest (LILI)    No data to display         Nurse Plan of Action:     RN screening call complete.  Reviewed allergies, medications and pharmacy, medical, surgical and social history with patient.  Chart updated.

## 2025-01-02 DIAGNOSIS — E03.9 ACQUIRED HYPOTHYROIDISM: ICD-10-CM

## 2025-01-02 DIAGNOSIS — H53.2 DOUBLE VISION: Primary | ICD-10-CM

## 2025-01-03 ENCOUNTER — OFFICE VISIT (OUTPATIENT)
Dept: OPHTHALMOLOGY | Facility: CLINIC | Age: 75
End: 2025-01-03
Payer: MEDICARE

## 2025-01-03 ENCOUNTER — ANESTHESIA EVENT (OUTPATIENT)
Dept: OPERATING ROOM | Facility: HOSPITAL | Age: 75
End: 2025-01-03
Payer: MEDICARE

## 2025-01-03 DIAGNOSIS — H50.21 HYPOTROPIA OF RIGHT EYE: ICD-10-CM

## 2025-01-03 DIAGNOSIS — H53.2 DOUBLE VISION: ICD-10-CM

## 2025-01-03 DIAGNOSIS — H57.89 THYROID EYE DISEASE: Primary | ICD-10-CM

## 2025-01-03 DIAGNOSIS — E03.9 ACQUIRED HYPOTHYROIDISM: ICD-10-CM

## 2025-01-03 DIAGNOSIS — E07.9 THYROID EYE DISEASE: Primary | ICD-10-CM

## 2025-01-03 PROCEDURE — 99205 OFFICE O/P NEW HI 60 MIN: CPT | Performed by: PSYCHIATRY & NEUROLOGY

## 2025-01-03 PROCEDURE — 92060 SENSORIMOTOR EXAMINATION: CPT | Performed by: PSYCHIATRY & NEUROLOGY

## 2025-01-03 ASSESSMENT — VISUAL ACUITY
METHOD: SNELLEN - LINEAR
CORRECTION_TYPE: GLASSES
OD_CC: 20/20
OS_CC: 20/20
OD_CC+: -2

## 2025-01-03 ASSESSMENT — CONF VISUAL FIELD
OS_SUPERIOR_NASAL_RESTRICTION: 0
OD_INFERIOR_NASAL_RESTRICTION: 0
OD_INFERIOR_TEMPORAL_RESTRICTION: 0
OD_SUPERIOR_NASAL_RESTRICTION: 0
OD_SUPERIOR_TEMPORAL_RESTRICTION: 0
OS_NORMAL: 1
OS_INFERIOR_TEMPORAL_RESTRICTION: 0
OS_INFERIOR_NASAL_RESTRICTION: 0
OD_NORMAL: 1
OS_SUPERIOR_TEMPORAL_RESTRICTION: 0

## 2025-01-03 ASSESSMENT — CUP TO DISC RATIO
OD_RATIO: 0.35
OS_RATIO: 0.35

## 2025-01-03 ASSESSMENT — ENCOUNTER SYMPTOMS
RESPIRATORY NEGATIVE: 0
GASTROINTESTINAL NEGATIVE: 0
MUSCULOSKELETAL NEGATIVE: 0
HEMATOLOGIC/LYMPHATIC NEGATIVE: 0
PSYCHIATRIC NEGATIVE: 0
NEUROLOGICAL NEGATIVE: 0
EYES NEGATIVE: 1
CARDIOVASCULAR NEGATIVE: 0
ENDOCRINE NEGATIVE: 0
CONSTITUTIONAL NEGATIVE: 0
ALLERGIC/IMMUNOLOGIC NEGATIVE: 0

## 2025-01-03 ASSESSMENT — LEVATOR FUNCTION
OS_LEVATOR: 15
OD_LEVATOR: 13

## 2025-01-03 ASSESSMENT — MARGIN REFLEX DISTANCE
OD_MRD1: 3
OS_MRD1: 5

## 2025-01-03 ASSESSMENT — TONOMETRY
OS_IOP_MMHG: 11
IOP_METHOD: GOLDMANN APPLANATION
OD_IOP_MMHG: 11

## 2025-01-03 NOTE — PROGRESS NOTES
Assessment and Plan    01/03/2025 Olivia OD 13 & OS 15 at base 97.     08/31/2018 CT facial bones without contrast, which I personally reviewed, shows right cheek soft tissue swelling.  04/09/2014 CT orbits without contrast, which I personally reviewed, shows no orbital lesion  04/04/2014 CT head without contrast, which I personally reviewed, shows stable findings of prior stroke.  01/02/2014 MRI brain without contrast, which I personally reviewed, shows continued FLAIR evidence of right posterior frontal stroke, appearing chronic.  11/17/2012 MRI brain without contrast & MRA head & neck, which I personally reviewed, show diffusion changes with right posterior frontal and other scattered right hemispheric strokes along with bihemispheric white matter changes with right internal carotid artery occlusion.  11/17/2012 CT head without contrast, which I personally reviewed, shows no acute lesion.    09/14/2014 HVF 30-2 OD fovea 39, wnl MD +1.21 & OS fovea 37, wnl MD +1.87.  Prior HVF.    This 74 year-old man with a history of Graves disease status post thyroidectomy with hypothyroidism, carotid stenosis, HLD, HTN, CKD, COPD, PVD presents for evaluation of Graves disease and intermittent diplopia.    He has a small right hypotropia in superior gaze. That finding is consistent with residual thyroid eye disease. The episode is not clear whether binocular or monocular. I suspect he has residual misalignment from thyroid eye disease and brief decompensation. He also might have had dry eye. The possibility of ocular myasthenia is much less. I doubt transient ischemic attack with so few other signs.    Small right ptosis appears consistent with involutional changes. I do not see accompanying anisocoria, and misalignment is not enough for strong suspicion for cranial nerve (CN) III palsy.    Plan    Check acetylcholine receptor binding, blocking & modulating antibodies, thyroid peroxidase antibody, thyroid stimulating  immunoglobulin, thyrotropin receptor antibody & thyroid function testing.  Keep a log of double vision events covering each eye individually to determine whether the double is in each individual eye or only when looking through both eyes together..    Follow up in 4 months with stereo plates. (Dilated 1/3/2025)

## 2025-01-07 ENCOUNTER — HOSPITAL ENCOUNTER (OUTPATIENT)
Dept: RADIOLOGY | Facility: CLINIC | Age: 75
Discharge: HOME | End: 2025-01-07
Payer: MEDICARE

## 2025-01-07 ENCOUNTER — DOCUMENTATION (OUTPATIENT)
Dept: PREADMISSION TESTING | Facility: HOSPITAL | Age: 75
End: 2025-01-07

## 2025-01-07 ENCOUNTER — PRE-ADMISSION TESTING (OUTPATIENT)
Dept: PREADMISSION TESTING | Facility: HOSPITAL | Age: 75
End: 2025-01-07
Payer: MEDICARE

## 2025-01-07 ENCOUNTER — LAB (OUTPATIENT)
Dept: LAB | Facility: LAB | Age: 75
End: 2025-01-07
Payer: MEDICARE

## 2025-01-07 VITALS
HEIGHT: 70 IN | DIASTOLIC BLOOD PRESSURE: 62 MMHG | RESPIRATION RATE: 16 BRPM | HEART RATE: 74 BPM | OXYGEN SATURATION: 98 % | BODY MASS INDEX: 33.14 KG/M2 | WEIGHT: 231.48 LBS | SYSTOLIC BLOOD PRESSURE: 156 MMHG | TEMPERATURE: 97.4 F

## 2025-01-07 DIAGNOSIS — H50.21 HYPOTROPIA OF RIGHT EYE: ICD-10-CM

## 2025-01-07 DIAGNOSIS — E07.9 THYROID EYE DISEASE: ICD-10-CM

## 2025-01-07 DIAGNOSIS — Z01.818 PREOP TESTING: ICD-10-CM

## 2025-01-07 DIAGNOSIS — I65.23 BILATERAL CAROTID ARTERY STENOSIS: ICD-10-CM

## 2025-01-07 DIAGNOSIS — H57.89 THYROID EYE DISEASE: ICD-10-CM

## 2025-01-07 DIAGNOSIS — H53.2 DOUBLE VISION: ICD-10-CM

## 2025-01-07 DIAGNOSIS — Z01.818 PREOP TESTING: Primary | ICD-10-CM

## 2025-01-07 LAB
ATRIAL RATE: 69 BPM
BASOPHILS # BLD AUTO: 0.06 X10*3/UL (ref 0–0.1)
BASOPHILS NFR BLD AUTO: 1 %
EOSINOPHIL # BLD AUTO: 0.23 X10*3/UL (ref 0–0.4)
EOSINOPHIL NFR BLD AUTO: 3.8 %
ERYTHROCYTE [DISTWIDTH] IN BLOOD BY AUTOMATED COUNT: 13.4 % (ref 11.5–14.5)
HCT VFR BLD AUTO: 41.5 % (ref 41–52)
HGB BLD-MCNC: 13.4 G/DL (ref 13.5–17.5)
IMM GRANULOCYTES # BLD AUTO: 0.02 X10*3/UL (ref 0–0.5)
IMM GRANULOCYTES NFR BLD AUTO: 0.3 % (ref 0–0.9)
LYMPHOCYTES # BLD AUTO: 1.7 X10*3/UL (ref 0.8–3)
LYMPHOCYTES NFR BLD AUTO: 28.4 %
MCH RBC QN AUTO: 29.4 PG (ref 26–34)
MCHC RBC AUTO-ENTMCNC: 32.3 G/DL (ref 32–36)
MCV RBC AUTO: 91 FL (ref 80–100)
MONOCYTES # BLD AUTO: 0.7 X10*3/UL (ref 0.05–0.8)
MONOCYTES NFR BLD AUTO: 11.7 %
NEUTROPHILS # BLD AUTO: 3.27 X10*3/UL (ref 1.6–5.5)
NEUTROPHILS NFR BLD AUTO: 54.8 %
NRBC BLD-RTO: 0 /100 WBCS (ref 0–0)
P AXIS: 43 DEGREES
P OFFSET: 201 MS
P ONSET: 150 MS
PLATELET # BLD AUTO: 192 X10*3/UL (ref 150–450)
PR INTERVAL: 152 MS
Q ONSET: 226 MS
QRS COUNT: 12 BEATS
QRS DURATION: 130 MS
QT INTERVAL: 404 MS
QTC CALCULATION(BAZETT): 432 MS
QTC FREDERICIA: 423 MS
R AXIS: 42 DEGREES
RBC # BLD AUTO: 4.56 X10*6/UL (ref 4.5–5.9)
T AXIS: 157 DEGREES
T OFFSET: 428 MS
T3FREE SERPL-MCNC: 3.1 PG/ML (ref 2.3–4.2)
T4 FREE SERPL-MCNC: 0.91 NG/DL (ref 0.61–1.12)
THYROPEROXIDASE AB SERPL-ACNC: <28 IU/ML
TSH SERPL-ACNC: 1.17 MIU/L (ref 0.44–3.98)
VENTRICULAR RATE: 69 BPM
WBC # BLD AUTO: 6 X10*3/UL (ref 4.4–11.3)

## 2025-01-07 PROCEDURE — 93880 EXTRACRANIAL BILAT STUDY: CPT

## 2025-01-07 PROCEDURE — 93010 ELECTROCARDIOGRAM REPORT: CPT | Performed by: INTERNAL MEDICINE

## 2025-01-07 PROCEDURE — 93880 EXTRACRANIAL BILAT STUDY: CPT | Performed by: RADIOLOGY

## 2025-01-07 PROCEDURE — 87081 CULTURE SCREEN ONLY: CPT | Mod: AHULAB | Performed by: PHYSICIAN ASSISTANT

## 2025-01-07 PROCEDURE — 99204 OFFICE O/P NEW MOD 45 MIN: CPT | Performed by: PHYSICIAN ASSISTANT

## 2025-01-07 PROCEDURE — 93005 ELECTROCARDIOGRAM TRACING: CPT | Performed by: PHYSICIAN ASSISTANT

## 2025-01-07 RX ORDER — CHLORHEXIDINE GLUCONATE ORAL RINSE 1.2 MG/ML
SOLUTION DENTAL
Qty: 475 ML | Refills: 0 | Status: SHIPPED | OUTPATIENT
Start: 2025-01-07

## 2025-01-07 RX ORDER — DEXTROMETHORPHAN HYDROBROMIDE, GUAIFENESIN 5; 100 MG/5ML; MG/5ML
650 LIQUID ORAL EVERY 8 HOURS PRN
COMMUNITY

## 2025-01-07 ASSESSMENT — ENCOUNTER SYMPTOMS
HEMOPTYSIS: 0
MYALGIAS: 0
EXCESSIVE BLEEDING: 0
EYE PAIN: 0
VISUAL CHANGE: 0
ABDOMINAL DISTENTION: 0
CONFUSION: 0
DIARRHEA: 0
LIGHT-HEADEDNESS: 0
NECK STIFFNESS: 0
UNEXPECTED WEIGHT CHANGE: 0
WOUND: 0
SKIN CHANGES: 0
DYSPNEA AT REST: 0
BRUISES/BLEEDS EASILY: 1
TROUBLE SWALLOWING: 0
CHILLS: 0
RHINORRHEA: 0
SINUS CONGESTION: 0
EYE DISCHARGE: 0
DIFFICULTY URINATING: 0
DYSPNEA WITH EXERTION: 0
SHORTNESS OF BREATH: 0
COUGH: 0
NECK PAIN: 0
ABDOMINAL PAIN: 0
CONSTIPATION: 0
DOUBLE VISION: 0
LIMITED RANGE OF MOTION: 0
PALPITATIONS: 0
NAUSEA: 0
WHEEZING: 0
WEAKNESS: 0
ARTHRALGIAS: 1
FEVER: 0
BLOOD IN STOOL: 0
NUMBNESS: 0
DYSURIA: 0
VOMITING: 0

## 2025-01-07 NOTE — CPM/PAT NURSE NOTE
CPM/PAT Nurse Note      Name: Sae Dan (Sae Dan)  /Age: 1950/74 y.o.       Past Medical History:   Diagnosis Date    Abnormal liver ultrasound     fatty infiltrates    Age-related nuclear cataract, left eye 2014    Age-related nuclear cataract of left eye    Age-related nuclear cataract, right eye 2014    Age-related nuclear cataract of right eye    Atherosclerotic heart disease of native coronary artery without angina pectoris 10/14/2013    Coronary artery disease    Carotid stenosis     Cerebral infarction     no residual issues    CKD (chronic kidney disease) stage 3, GFR 30-59 ml/min (Multi)     24 BUN 35 Creatinine 1.46 GFR 50 - no NSAIDS    COPD (chronic obstructive pulmonary disease) (Multi)     pt denies    Depression     occasional/ SAD    Essential (primary) hypertension 08/15/2016    Benign essential hypertension    Graves disease     History of stress test 2023    Hyperlipidemia     Hypocalcemia 08/15/2016    Hypocalcemia    Hypothyroidism     OA (osteoarthritis)     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    PVD (peripheral vascular disease) (CMS-HCC)     Shortness of breath     Shortness of breath on exertion - pt denies 24       Past Surgical History:   Procedure Laterality Date    APPENDECTOMY      CT ANGIO CORONARY ART WITH HEARTFLOW IF SCORE >30%  2018    CT HEART CORONARY ANGIOGRAM 2018 [facility] CLINICAL LEGACY    MR HEAD ANGIO WO IV CONTRAST  2012    MR HEAD ANGIO WO IV CONTRAST 2012 [facility]    MR NECK ANGIO WO IV CONTRAST  11/17/2012    MR NECK ANGIO WO IV CONTRAST 11/17/2012 [facility] EMERGENCY LEGACY    OTHER SURGICAL HISTORY  08/09/2018    Endarterectomy Carotid Artery    TOTAL THYROIDECTOMY  01/09/2015    Thyroid Surgery Total Thyroidectomy       Patient  has no history on file for sexual activity.    Family History   Problem Relation Name Age of Onset    Cancer Mother      Cataracts Father      Cancer Father      Stroke Father      Glaucoma Other grandmother        Allergies   Allergen Reactions    Morphine Rash       Prior to Admission medications    Medication Sig Start Date End Date Taking? Authorizing Provider   acetaminophen (Tylenol 8 HOUR) 650 mg ER tablet Take 1 tablet (650 mg) by mouth every 8 hours if needed for mild pain (1 - 3). Do not crush, chew, or split.    Historical Provider, MD   aspirin 81 mg EC tablet Take 1 tablet (81 mg) by mouth once daily.    Historical Provider, MD   chlorhexidine (Peridex) 0.12 % solution Swish for 30 seconds and spit 15mL of solution the night before and morning of surgery 1/7/25   Kay Munoz PA-C   levothyroxine (Synthroid, Levoxyl) 112 mcg tablet Take 1 tablet (112 mcg) by mouth once daily. 9/20/24 3/19/25  Miguel Angel Hendrix, DO   olmesartan (BENIcar) 20 mg tablet Take 1 tablet (20 mg) by mouth once daily. 9/20/24 3/19/25  Miguel Angel Hendrix, DO   pravastatin (Pravachol) 80 mg tablet Take 1 tablet (80 mg) by mouth once daily. as directed 9/20/24 3/19/25  Miguel Angel Hendrix DO        PAT ROS     DASI Risk Score      Flowsheet Row Questionnaire Series Submission from 12/3/2024 in [facility] with Generic Provider Tania   Can you take care of yourself (eat, dress, bathe, or use toilet)?  2.75  filed at 12/03/2024 1424   Can you walk a block or two on level ground?  2.75  filed at 12/03/2024 1424   Can you climb a flight of stairs or walk up a hill? 5.5  filed at 12/03/2024 1424   Can you run a short distance? 0  filed at 12/03/2024 1424   Can  you do light work around the house like dusting or washing dishes? 2.7  filed at 12/03/2024 1424   Can you do moderate work around the house like vacuuming, sweeping floors or carrying groceries? 3.5  filed at 12/03/2024 1424   Can you do heavy work around the house like scrubbing floors or lifting and moving heavy furniture?  8  filed at 12/03/2024 1424   Can you do yard work like raking leaves, weeding or pushing a mower? 4.5  filed at 12/03/2024 1424   Can you have sexual relations? 0  filed at 12/03/2024 1424   Can you participate in moderate recreational activities like golf, bowling, dancing, doubles tennis or throwing a baseball or football? 6  filed at 12/03/2024 1424   Can you participate in strenous sports like swimming, singles tennis, football, basketball, or skiing? 0  filed at 12/03/2024 1424          Caprini DVT Assessment    No data to display       Modified Frailty Index    No data to display       CHADS2 Stroke Risk  Current as of 5 minutes ago        N/A 3 to 100%: High Risk   2 to < 3%: Medium Risk   0 to < 2%: Low Risk     Last Change: N/A          This score determines the patient's risk of having a stroke if the patient has atrial fibrillation.        This score is not applicable to this patient. Components are not calculated.          Revised Cardiac Risk Index    No data to display       Apfel Simplified Score    No data to display       Risk Analysis Index Results This Encounter    No data found in the last 10 encounters.       Stop Bang Score      Flowsheet Row Questionnaire Series Submission from 12/3/2024 in [facility] with Generic Provider Tania   Do you snore loudly? 0  filed at 12/03/2024 1424   Do you often feel tired or fatigued after your sleep? 0  filed at 12/03/2024 1424   Has anyone ever observed you stop breathing in your sleep? 0  filed at 12/03/2024 1424   Do you have or are you being treated for high blood pressure? 1  filed at 12/03/2024 1424   Recent BMI (Calculated)  34  filed at 12/03/2024 1424   Is BMI greater than 35 kg/m2? 0=No  filed at 12/03/2024 1424   Age older than 50 years old? 1=Yes  filed at 12/03/2024 1424   Gender - Male 1=Yes  filed at 12/03/2024 1424          Prodigy: High Risk  Total Score: 20              Prodigy Age Score      Prodigy Gender Score          ARISCAT Score for Postoperative Pulmonary Complications      Flowsheet Row Pre-Admission Testing from 1/7/2025 in [facility] with Kay Munoz PA-C   Age, years  3 filed at 01/07/2025 0754   Preoperative SpO2 0 filed at 01/07/2025 0754   Respiratory infection in the last month Either upper or lower (i.e., URI, bronchitis, pneumonia), with fever and antibiotic treatment 0 filed at 01/07/2025 0754   Preoperative anemoa (Hgb less than 10 g/dl) 0 filed at 01/07/2025 0754   Surgical incision  15 filed at 01/07/2025 0754   Duration of surgery  16 filed at 01/07/2025 0754   Emergency Procedure  0 filed at 01/07/2025 0754   ARISCAT Total Score  34 filed at 01/07/2025 0754          Brendan Perioperative Risk for Myocardial Infarction or Cardiac Arrest (LILI)    No data to display         Nurse Plan of Action:   After Visit Summary (AVS) reviewed and patient verbalized good understanding of medications and NPO instructions.  Pre-op infection prevention measures:  CHG showers and mouthwash reviewed, understanding voiced.  CHG soap given and patient verbalized need to pick CHG mouthwash at their preferred local pharmacy.

## 2025-01-07 NOTE — H&P (VIEW-ONLY)
[facility] Evaluation       Name: Sae Dan (Sae Dan)  /Age: 1950/74 y.o.         Date of Consult: 25    Referring Provider: Dr. Brooks    Surgery, Date, and Length: Robotic Assisted Abdominal Wall Hernia Repair; Mesh Placement , 1/15/25, 150MIN    Sae Dan is a 74 y.o. year-old male who presents to the [facility] for perioperative risk assessment prior to surgery.    Patient presents with a primary diagnosis of abdominal wall hernia. Occasionally bothers him with exertional activities or coughing. Hernia has grown in size over the past year or so. He is s/p appendectomy in . He denies changes to bowel habits.  No f/c/n/v.      This note was created in part upon personal review of patient's medical records.      Patient is scheduled to have Robotic Assisted Abdominal Wall Hernia Repair; Mesh Placement      Pt denies any past history of anesthetic complications such as PONV, awareness, prolonged sedation, dental damage, aspiration, cardiac arrest, difficult intubation, difficult I.V. access or unexpected hospital admissions.  NO malignant hyperthermia and or pseudocholinesterase deficiency.  No history of blood transfusions     The patient is not a Confucianist and will accept blood and blood products if medically indicated.   Type and screen NOT sent.   Past Medical History:   Diagnosis Date    Abnormal liver ultrasound     fatty infiltrates    Age-related nuclear cataract, left eye 2014    Age-related nuclear cataract of left eye    Age-related nuclear cataract, right eye 2014    Age-related nuclear cataract of right eye    Atherosclerotic heart disease of native coronary artery without angina pectoris 10/14/2013    Coronary artery disease    Carotid stenosis     Cerebral infarction     no residual issues    CKD (chronic kidney disease) stage 3, GFR 30-59 ml/min (Multi)     24 BUN 35 Creatinine 1.46 GFR 50 - no NSAIDS    COPD (chronic obstructive  pulmonary disease) (Multi)     pt denies    Depression     occasional/ SAD    Essential (primary) hypertension 08/15/2016    Benign essential hypertension    Graves disease     History of stress test 03/09/2023    Hyperlipidemia     Hypocalcemia 08/15/2016    Hypocalcemia    Hypothyroidism     OA (osteoarthritis)     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    PVD (peripheral vascular disease) (CMS-HCC)     Shortness of breath     Shortness of breath on exertion - pt denies 12/31/24       Past Surgical History:   Procedure Laterality Date    APPENDECTOMY      CT ANGIO CORONARY ART WITH HEARTFLOW IF SCORE >30%  11/08/2018    CT HEART CORONARY ANGIOGRAM 11/8/2018 [facility] CLINICAL LEGACY    MR HEAD ANGIO WO IV CONTRAST  11/17/2012    MR HEAD ANGIO WO IV CONTRAST 11/17/2012 [facility]    MR NECK ANGIO WO IV CONTRAST  11/17/2012    MR NECK ANGIO WO IV CONTRAST 11/17/2012 [facility] EMERGENCY LEGACY    OTHER SURGICAL HISTORY  08/09/2018    Endarterectomy Carotid Artery    TOTAL THYROIDECTOMY  01/09/2015    Thyroid Surgery Total Thyroidectomy       Patient  has no history on file for sexual activity.    Family History   Problem Relation Name Age of Onset    Cancer Mother      Cataracts Father      Cancer Father      Stroke Father      Glaucoma Other grandmother      Social History     Socioeconomic History    Marital status:      Spouse name: Not on file    Number of children: Not on file    Years of education: Not on file    Highest education level: Not on file   Occupational History     Not on file   Tobacco Use    Smoking status: Former     Current packs/day: 0.00     Types: Cigarettes, Cigars     Quit date:      Years since quittin.0     Passive exposure: Past    Smokeless tobacco: Never   Vaping Use    Vaping status: Never Used   Substance and Sexual Activity    Alcohol use: Yes     Comment: rare    Drug use: Never    Sexual activity: Not on file   Other Topics Concern    Not on file   Social History Narrative    Not on file     Social Drivers of Health     Financial Resource Strain: Not on file   Food Insecurity: Not on file   Transportation Needs: Not on file   Physical Activity: Not on file   Stress: Not on file   Social Connections: Not on file   Intimate Partner Violence: Not on file   Housing Stability: Not on file        Allergies   Allergen Reactions    Morphine Rash       Current Outpatient Medications   Medication Instructions    acetaminophen (TYLENOL 8 HOUR) 650 mg, oral, Every 8 hours PRN, Do not crush, chew, or split.    aspirin 81 mg, Daily    chlorhexidine (Peridex) 0.12 % solution Swish for 30 seconds and spit 15mL of solution the night before and morning of surgery    levothyroxine (SYNTHROID, LEVOXYL) 112 mcg, oral, Daily    olmesartan (BENICAR) 20 mg, oral, Daily    pravastatin (PRAVACHOL) 80 mg, oral, Daily, as directed         PAT ROS:   Constitutional:    no fever   no chills   no unexpected weight change  Neuro/Psych:    no numbness   no weakness   no light-headedness   no confusion  Eyes:    no discharge   no pain   no vision loss   no diplopia   no visual disturbance   use of corrective lenses  Ears:    no ear pain   no hearing loss   no tinnitus  Nose:    no nasal discharge   no sinus congestion   no epistaxis  Mouth:    no dental issues   no mouth pain   no oral bleeding   no mouth lesions  Throat:    no throat pain   no dysphagia  Neck:    no neck pain   no neck stiffness  Cardio:    Functional 4 Mets. Patient denies SOB walking up 2 flights of stairs    Gardening, mowing, fishing, cooking, cleaning, grocery shopping   no chest pain   no palpitations   no peripheral edema   no dyspnea   no VILLATORO  Respiratory:    no cough   no wheezing   no hemoptysis   no shortness of breath  Endocrine:    no cold intolerance   no heat intolerance  GI:    no abdominal distention   no abdominal pain   no constipation   no diarrhea   no nausea   no vomiting   no blood in stool  :    no difficulty urinating   no dysuria   no oliguria  Musculoskeletal:    arthralgias (left shoulder, LBP, right hip)   no myalgias   no decreased ROM  Hematologic:    bruises/bleeds easily (ASA 81mg)   no excessive bleeding   no history of blood transfusion   no blood clots  Skin:   no skin changes   no sores/wound   no rash      Physical Exam  Constitutional:       General: He is not in acute distress.     Appearance: Normal appearance. He is not ill-appearing, toxic-appearing or diaphoretic.   HENT:      Head: Normocephalic and atraumatic.      Nose: Nose normal. No congestion or rhinorrhea.      Mouth/Throat:      Mouth: Mucous membranes are moist.      Pharynx: No posterior oropharyngeal erythema.   Eyes:      Extraocular Movements: Extraocular movements intact.      Conjunctiva/sclera: Conjunctivae normal.   Cardiovascular:      Rate and Rhythm: Normal rate and regular rhythm.      Pulses: Normal pulses.      Heart sounds: Normal heart sounds. No murmur heard.     No friction rub. No gallop.   Pulmonary:      Effort: Pulmonary effort is normal. No respiratory distress.      Breath sounds: Normal breath sounds. No stridor. No wheezing, rhonchi or rales.   Abdominal:      General: Bowel sounds are normal. There is no distension.      Palpations: Abdomen is soft. There is no mass.      Tenderness: There is no abdominal tenderness. There is no guarding or rebound.      Hernia: A hernia (umbilical) is present.   Musculoskeletal:         General: No swelling, tenderness, deformity or signs of injury. Normal  "range of motion.      Cervical back: Normal range of motion and neck supple. No rigidity or tenderness.   Skin:     General: Skin is warm and dry.      Coloration: Skin is not jaundiced or pale.      Findings: No bruising, erythema, lesion or rash.   Neurological:      General: No focal deficit present.      Mental Status: He is alert and oriented to person, place, and time.      Cranial Nerves: No cranial nerve deficit.      Sensory: No sensory deficit.      Motor: No weakness.      Coordination: Coordination normal.   Psychiatric:         Mood and Affect: Mood normal.         Behavior: Behavior normal.          PAT AIRWAY:   Airway:     Mallampati::  III    Neck ROM::  Full   Full upper denture; lower partial denture        Visit Vitals  /62   Pulse 74   Temp 36.3 °C (97.4 °F)   Resp 16   Ht 1.778 m (5' 10\")   Wt 105 kg (231 lb 7.7 oz)   SpO2 98%   BMI 33.21 kg/m²   Smoking Status Former   BSA 2.28 m²      LABS:  Lab Results   Component Value Date    GLUCOSE 94 12/24/2024    CALCIUM 8.9 12/24/2024     12/24/2024    K 4.5 12/24/2024    CO2 23 12/24/2024     (H) 12/24/2024    BUN 35 (H) 12/24/2024    CREATININE 1.46 (H) 12/24/2024      Lab Results   Component Value Date    WBC 6.0 01/07/2025    HGB 13.4 (L) 01/07/2025    HCT 41.5 01/07/2025    MCV 91 01/07/2025     01/07/2025        EKG 1/7/25  NSR  Possible LAE  Nonspecific intraventricular block  T wave abnormality, consider inferolateral ischemia  Abnormal EKG  Vent rate = 69 bpm    US carotid 1/7/25  IMPRESSION:  There is a known complete occlusion at the origin of the right  internal carotid artery with no flow seen in the right external  carotid artery on this study.      The stent graft within the distal left common carotid artery is  patent with no occlusion of the stent. There is small amount of  plaque within left internal carotid artery producing less than 50%  stenosis.    CT chest 10/16/24  IMPRESSION:  1. Stable pulmonary " nodules as described above measuring up to 5 mm,  likely benign. Consider continued annual screening for lung cancer  with low-dose chest CT if the patient meets USPSTF criteria (age  50-80, 20+ pack-year history, current smoker or quit in past 15  years, no health problem that substantially limits life expectancy).  2. Interval decrease in size of right hilar lymph node and stable  subcentimeter mediastinal lymph nodes, likely reactive in etiology.  3.  Diffuse hypoattenuation of the hepatic parenchyma suggesting  steatosis. Correlate with hepatic function studies.  4. Mild upper lung predominant emphysema and smoking related small  airways disease of the lungs noted.    Nuclear stress test 3/9/23  IMPRESSION:  1. Negative myocardial perfusion study without evidence of inducible  myocardial ischemia or prior infarction.  2. The left ventricle is normal in size.  3. Normal LV wall motion with a post-stress LV EF estimated at 57%.    US carotid 5/5/21  CONCLUSIONS:  Right Carotid: Findings are consistent with a known occlusion of the right ICA. No flow seen by color Doppler. Right external carotid artery appears patent with no evidence of stenosis. The right vertebral artery is patent with antegrade flow. No evidence of hemodynamically significant stenosis in the right subclavian.  Left Carotid: Findings are consistent with less than 50% stenosis of the left proximal ICA. Left external carotid artery appears patent with no evidence of stenosis. The left vertebral artery is patent with antegrade flow. No evidence of hemodynamically significant stenosis in the left subclavian. Left CCA stent appears patent. Velocities are: Proximal Stent-125cm/s, mid stent-124cm/s and distal stent-103cm/s.  Comparison:  Compared with study from 9/6/2019, no significant change.      Assessment and Plan:     74 y.o.  male  scheduled for Robotic Assisted Abdominal Wall Hernia Repair; Mesh Placement on 1/15/25 with Dr. Brooks for  hernia.    Presents to CPM today for perioperative risk stratification and optimization      Cardiovascular:  Patient has no active cardiac symptoms.   Patient denies any chest pain, tightness, heaviness, pressure, radiating pain, palpitations, irregular heartbeats, lightheadedness, cough, congestion, shortness of breath, VILLATORO, PND, near syncope, weight loss or gain.    METS: 4+  RCRI: 1 point, 6.0% risk for postoperative MACE     HTN - olmesartan HOLD evening prior to surgery and morning of surgery     Encouraged lifestyle modifications, low-sodium diet, and increase activity as tolerated.  Monitor BP and follow up with managing physician for readings sustaining >140/90.    HLD - cont statin on dos     CAD - mild per CT chest scan 10/2024    Pulmonary:  No pulmonary diagnosis, however patient is at increased risk of perioperative complications secondary to  age > 60, COPD, obesity, duration of surgery > 2 hours  Stop Bang score is 3 placing patient at intermediate risk for CONNIE  ARISCAT: 26-44 points, 13.3% risk of in-hospital postoperative pulmonary complication  PRODIGY: High risk for opioid induced respiratory depression    **Pt provided with deep breathing exercises, incentive spirometer and instructions for use during PAT visit today**    Endocrine:  Hypothyroidism - cont levothyroxine on dos     Renal:  CKDIIIA  12/24/24 crt 1.46; GFR 50    Recommendations to avoid nephrotoxic drugs and carefully monitor fluid status to maintain euvolemia. Use dose adjusted medications as needed for the underlying level of renal function.     Neuro:  No neurologic diagnosis, however, the patient is at increased risk for perioperative delirium secondary to  age, type and duration of surgery, Patient instructed on and provided cognitive exercises    Vascular:  Carotid stenosis - pt s/p L CEA and total occlusion of R ICA - last US carotid 2021 and last vascular visit with Janiya Ha CNP 2019. Discussed with Dr. Alonzo and Dr. Hendrix and  "concluded vascular follow up and repeat carotid US were necessary.    -- US carotid 1/7/25 1:45pm  --vascular appt 1/8/24     Per vascular note Ashia Burns CNP 1/8/25:  \"Carotid artery stenosis status post left CEA (11/2012) and left carotid stenting (12/2012).   Right carotid artery is known to be occluded.  Recent carotid duplex demonstrates known right carotid occlusion and less than 50% left ICA stenosis.  He is currently asymptomatic.  He may proceed with hernia repair.  Continue aspirin and statin.  Return to clinic in 1 year with carotid duplex.\"    Hematology:  Anemia  1/7/24 H/H 13.4/41.5%  Patient instructed to ambulate as soon as possible postoperatively to decrease thromboembolic risk.   Initiate mechanical DVT prophylaxis as soon as possible and initiate chemical prophylaxis when deemed safe from a bleeding standpoint post surgery.     LABS: CBC, MRSA, EKG and US carotid ordered    Communication: Discussed case with Dr. Alonzo and Dr. Hendrix 1/6/25.  Pt will need follow up with vascular for diagnosis of b/l carotid stenosis and will need repeat UA carotid before surgery.      Followup: MRSA, US carotid and vascular risk assessment pending    Addendum 1/9/25:  Pt had repeat US carotid and results are stable.  He also established care with new vascular provider and pt is considered optimized for surgery from vascular standpoint; no additional testing required.    Caprini: 8    Risk assessment complete.  Patient is scheduled for a intermediate surgical risk procedure.        Preoperative medication instructions were provided and reviewed with the patient.  Any additional testing or evaluation was explained to the patient.  Nothing by mouth instructions were discussed and patient's questions were answered prior to conclusion to this encounter.  Patient verbalized understanding of preoperative instructions given in preadmission testing; discharge instructions available in EMR.    This note was dictated " by a speech recognition.  Minor errors may have been detected in a speech recognition.

## 2025-01-07 NOTE — CPM/PAT H&P
[facility] Evaluation       Name: Sae Dan (Sae Dan)  /Age: 1950/74 y.o.         Date of Consult: 25    Referring Provider: Dr. Brooks    Surgery, Date, and Length: Robotic Assisted Abdominal Wall Hernia Repair; Mesh Placement , 1/15/25, 150MIN    Sae Dan is a 74 y.o. year-old male who presents to the [facility] for perioperative risk assessment prior to surgery.    Patient presents with a primary diagnosis of abdominal wall hernia. Occasionally bothers him with exertional activities or coughing. Hernia has grown in size over the past year or so. He is s/p appendectomy in . He denies changes to bowel habits.  No f/c/n/v.      This note was created in part upon personal review of patient's medical records.      Patient is scheduled to have Robotic Assisted Abdominal Wall Hernia Repair; Mesh Placement      Pt denies any past history of anesthetic complications such as PONV, awareness, prolonged sedation, dental damage, aspiration, cardiac arrest, difficult intubation, difficult I.V. access or unexpected hospital admissions.  NO malignant hyperthermia and or pseudocholinesterase deficiency.  No history of blood transfusions     The patient is not a [employer]oroast[employer]ism and will accept blood and blood products if medically indicated.   Type and screen NOT sent.   Past Medical History:   Diagnosis Date    Abnormal liver ultrasound     fatty infiltrates    Age-related nuclear cataract, left eye 2014    Age-related nuclear cataract of left eye    Age-related nuclear cataract, right eye 2014    Age-related nuclear cataract of right eye    Atherosclerotic heart disease of native coronary artery without angina pectoris 10/14/2013    Coronary artery disease    Carotid stenosis     Cerebral infarction     no residual issues    CKD (chronic kidney disease) stage 3, GFR 30-59 ml/min (Multi)     24 BUN 35 Creatinine 1.46 GFR 50 - no NSAIDS    COPD (chronic obstructive  pulmonary disease) (Multi)     pt denies    Depression     occasional/ SAD    Essential (primary) hypertension 08/15/2016    Benign essential hypertension    Graves disease     History of stress test 03/09/2023    Hyperlipidemia     Hypocalcemia 08/15/2016    Hypocalcemia    Hypothyroidism     OA (osteoarthritis)     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    PVD (peripheral vascular disease) (CMS-HCC)     Shortness of breath     Shortness of breath on exertion - pt denies 12/31/24       Past Surgical History:   Procedure Laterality Date    APPENDECTOMY      CT ANGIO CORONARY ART WITH HEARTFLOW IF SCORE >30%  11/08/2018    CT HEART CORONARY ANGIOGRAM 11/8/2018 [facility] CLINICAL LEGACY    MR HEAD ANGIO WO IV CONTRAST  11/17/2012    MR HEAD ANGIO WO IV CONTRAST 11/17/2012 [facility]    MR NECK ANGIO WO IV CONTRAST  11/17/2012    MR NECK ANGIO WO IV CONTRAST 11/17/2012 [facility] EMERGENCY LEGACY    OTHER SURGICAL HISTORY  08/09/2018    Endarterectomy Carotid Artery    TOTAL THYROIDECTOMY  01/09/2015    Thyroid Surgery Total Thyroidectomy       Patient  has no history on file for sexual activity.    Family History   Problem Relation Name Age of Onset    Cancer Mother      Cataracts Father      Cancer Father      Stroke Father      Glaucoma Other grandmother      Social History     Socioeconomic History    Marital status:      Spouse name: Not on file    Number of children: Not on file    Years of education: Not on file    Highest education level: Not on file   Occupational History     Not on file   Tobacco Use    Smoking status: Former     Current packs/day: 0.00     Types: Cigarettes, Cigars     Quit date:      Years since quittin.0     Passive exposure: Past    Smokeless tobacco: Never   Vaping Use    Vaping status: Never Used   Substance and Sexual Activity    Alcohol use: Yes     Comment: rare    Drug use: Never    Sexual activity: Not on file   Other Topics Concern    Not on file   Social History Narrative    Not on file     Social Drivers of Health     Financial Resource Strain: Not on file   Food Insecurity: Not on file   Transportation Needs: Not on file   Physical Activity: Not on file   Stress: Not on file   Social Connections: Not on file   Intimate Partner Violence: Not on file   Housing Stability: Not on file        Allergies   Allergen Reactions    Morphine Rash       Current Outpatient Medications   Medication Instructions    acetaminophen (TYLENOL 8 HOUR) 650 mg, oral, Every 8 hours PRN, Do not crush, chew, or split.    aspirin 81 mg, Daily    chlorhexidine (Peridex) 0.12 % solution Swish for 30 seconds and spit 15mL of solution the night before and morning of surgery    levothyroxine (SYNTHROID, LEVOXYL) 112 mcg, oral, Daily    olmesartan (BENICAR) 20 mg, oral, Daily    pravastatin (PRAVACHOL) 80 mg, oral, Daily, as directed         PAT ROS:   Constitutional:    no fever   no chills   no unexpected weight change  Neuro/Psych:    no numbness   no weakness   no light-headedness   no confusion  Eyes:    no discharge   no pain   no vision loss   no diplopia   no visual disturbance   use of corrective lenses  Ears:    no ear pain   no hearing loss   no tinnitus  Nose:    no nasal discharge   no sinus congestion   no epistaxis  Mouth:    no dental issues   no mouth pain   no oral bleeding   no mouth lesions  Throat:    no throat pain   no dysphagia  Neck:    no neck pain   no neck stiffness  Cardio:    Functional 4 Mets. Patient denies SOB walking up 2 flights of stairs    Gardening, mowing, fishing, cooking, cleaning, grocery shopping   no chest pain   no palpitations   no peripheral edema   no dyspnea   no VILLATORO  Respiratory:    no cough   no wheezing   no hemoptysis   no shortness of breath  Endocrine:    no cold intolerance   no heat intolerance  GI:    no abdominal distention   no abdominal pain   no constipation   no diarrhea   no nausea   no vomiting   no blood in stool  :    no difficulty urinating   no dysuria   no oliguria  Musculoskeletal:    arthralgias (left shoulder, LBP, right hip)   no myalgias   no decreased ROM  Hematologic:    bruises/bleeds easily (ASA 81mg)   no excessive bleeding   no history of blood transfusion   no blood clots  Skin:   no skin changes   no sores/wound   no rash      Physical Exam  Constitutional:       General: He is not in acute distress.     Appearance: Normal appearance. He is not ill-appearing, toxic-appearing or diaphoretic.   HENT:      Head: Normocephalic and atraumatic.      Nose: Nose normal. No congestion or rhinorrhea.      Mouth/Throat:      Mouth: Mucous membranes are moist.      Pharynx: No posterior oropharyngeal erythema.   Eyes:      Extraocular Movements: Extraocular movements intact.      Conjunctiva/sclera: Conjunctivae normal.   Cardiovascular:      Rate and Rhythm: Normal rate and regular rhythm.      Pulses: Normal pulses.      Heart sounds: Normal heart sounds. No murmur heard.     No friction rub. No gallop.   Pulmonary:      Effort: Pulmonary effort is normal. No respiratory distress.      Breath sounds: Normal breath sounds. No stridor. No wheezing, rhonchi or rales.   Abdominal:      General: Bowel sounds are normal. There is no distension.      Palpations: Abdomen is soft. There is no mass.      Tenderness: There is no abdominal tenderness. There is no guarding or rebound.      Hernia: A hernia (umbilical) is present.   Musculoskeletal:         General: No swelling, tenderness, deformity or signs of injury. Normal  "range of motion.      Cervical back: Normal range of motion and neck supple. No rigidity or tenderness.   Skin:     General: Skin is warm and dry.      Coloration: Skin is not jaundiced or pale.      Findings: No bruising, erythema, lesion or rash.   Neurological:      General: No focal deficit present.      Mental Status: He is alert and oriented to person, place, and time.      Cranial Nerves: No cranial nerve deficit.      Sensory: No sensory deficit.      Motor: No weakness.      Coordination: Coordination normal.   Psychiatric:         Mood and Affect: Mood normal.         Behavior: Behavior normal.          PAT AIRWAY:   Airway:     Mallampati::  III    Neck ROM::  Full   Full upper denture; lower partial denture        Visit Vitals  /62   Pulse 74   Temp 36.3 °C (97.4 °F)   Resp 16   Ht 1.778 m (5' 10\")   Wt 105 kg (231 lb 7.7 oz)   SpO2 98%   BMI 33.21 kg/m²   Smoking Status Former   BSA 2.28 m²      LABS:  Lab Results   Component Value Date    GLUCOSE 94 12/24/2024    CALCIUM 8.9 12/24/2024     12/24/2024    K 4.5 12/24/2024    CO2 23 12/24/2024     (H) 12/24/2024    BUN 35 (H) 12/24/2024    CREATININE 1.46 (H) 12/24/2024      Lab Results   Component Value Date    WBC 6.0 01/07/2025    HGB 13.4 (L) 01/07/2025    HCT 41.5 01/07/2025    MCV 91 01/07/2025     01/07/2025        EKG 1/7/25  NSR  Possible LAE  Nonspecific intraventricular block  T wave abnormality, consider inferolateral ischemia  Abnormal EKG  Vent rate = 69 bpm    US carotid 1/7/25  IMPRESSION:  There is a known complete occlusion at the origin of the right  internal carotid artery with no flow seen in the right external  carotid artery on this study.      The stent graft within the distal left common carotid artery is  patent with no occlusion of the stent. There is small amount of  plaque within left internal carotid artery producing less than 50%  stenosis.    CT chest 10/16/24  IMPRESSION:  1. Stable pulmonary " nodules as described above measuring up to 5 mm,  likely benign. Consider continued annual screening for lung cancer  with low-dose chest CT if the patient meets USPSTF criteria (age  50-80, 20+ pack-year history, current smoker or quit in past 15  years, no health problem that substantially limits life expectancy).  2. Interval decrease in size of right hilar lymph node and stable  subcentimeter mediastinal lymph nodes, likely reactive in etiology.  3.  Diffuse hypoattenuation of the hepatic parenchyma suggesting  steatosis. Correlate with hepatic function studies.  4. Mild upper lung predominant emphysema and smoking related small  airways disease of the lungs noted.    Nuclear stress test 3/9/23  IMPRESSION:  1. Negative myocardial perfusion study without evidence of inducible  myocardial ischemia or prior infarction.  2. The left ventricle is normal in size.  3. Normal LV wall motion with a post-stress LV EF estimated at 57%.    US carotid 5/5/21  CONCLUSIONS:  Right Carotid: Findings are consistent with a known occlusion of the right ICA. No flow seen by color Doppler. Right external carotid artery appears patent with no evidence of stenosis. The right vertebral artery is patent with antegrade flow. No evidence of hemodynamically significant stenosis in the right subclavian.  Left Carotid: Findings are consistent with less than 50% stenosis of the left proximal ICA. Left external carotid artery appears patent with no evidence of stenosis. The left vertebral artery is patent with antegrade flow. No evidence of hemodynamically significant stenosis in the left subclavian. Left CCA stent appears patent. Velocities are: Proximal Stent-125cm/s, mid stent-124cm/s and distal stent-103cm/s.  Comparison:  Compared with study from 9/6/2019, no significant change.      Assessment and Plan:     74 y.o.  male  scheduled for Robotic Assisted Abdominal Wall Hernia Repair; Mesh Placement on 1/15/25 with Dr. Brooks for  hernia.    Presents to CPM today for perioperative risk stratification and optimization      Cardiovascular:  Patient has no active cardiac symptoms.   Patient denies any chest pain, tightness, heaviness, pressure, radiating pain, palpitations, irregular heartbeats, lightheadedness, cough, congestion, shortness of breath, VILLATORO, PND, near syncope, weight loss or gain.    METS: 4+  RCRI: 1 point, 6.0% risk for postoperative MACE     HTN - olmesartan HOLD evening prior to surgery and morning of surgery     Encouraged lifestyle modifications, low-sodium diet, and increase activity as tolerated.  Monitor BP and follow up with managing physician for readings sustaining >140/90.    HLD - cont statin on dos     CAD - mild per CT chest scan 10/2024    Pulmonary:  No pulmonary diagnosis, however patient is at increased risk of perioperative complications secondary to  age > 60, COPD, obesity, duration of surgery > 2 hours  Stop Bang score is 3 placing patient at intermediate risk for CONNIE  ARISCAT: 26-44 points, 13.3% risk of in-hospital postoperative pulmonary complication  PRODIGY: High risk for opioid induced respiratory depression    **Pt provided with deep breathing exercises, incentive spirometer and instructions for use during PAT visit today**    Endocrine:  Hypothyroidism - cont levothyroxine on dos     Renal:  CKDIIIA  12/24/24 crt 1.46; GFR 50    Recommendations to avoid nephrotoxic drugs and carefully monitor fluid status to maintain euvolemia. Use dose adjusted medications as needed for the underlying level of renal function.     Neuro:  No neurologic diagnosis, however, the patient is at increased risk for perioperative delirium secondary to  age, type and duration of surgery, Patient instructed on and provided cognitive exercises    Vascular:  Carotid stenosis - pt s/p L CEA and total occlusion of R ICA - last US carotid 2021 and last vascular visit with Janiya Ha CNP 2019. Discussed with Dr. Alonzo and Dr. Hendrix and  "concluded vascular follow up and repeat carotid US were necessary.    -- US carotid 1/7/25 1:45pm  --vascular appt 1/8/24     Per vascular note Ashia Burns CNP 1/8/25:  \"Carotid artery stenosis status post left CEA (11/2012) and left carotid stenting (12/2012).   Right carotid artery is known to be occluded.  Recent carotid duplex demonstrates known right carotid occlusion and less than 50% left ICA stenosis.  He is currently asymptomatic.  He may proceed with hernia repair.  Continue aspirin and statin.  Return to clinic in 1 year with carotid duplex.\"    Hematology:  Anemia  1/7/24 H/H 13.4/41.5%  Patient instructed to ambulate as soon as possible postoperatively to decrease thromboembolic risk.   Initiate mechanical DVT prophylaxis as soon as possible and initiate chemical prophylaxis when deemed safe from a bleeding standpoint post surgery.     LABS: CBC, MRSA, EKG and US carotid ordered    Communication: Discussed case with Dr. Alonzo and Dr. Hendrix 1/6/25.  Pt will need follow up with vascular for diagnosis of b/l carotid stenosis and will need repeat UA carotid before surgery.      Followup: MRSA, US carotid and vascular risk assessment pending    Addendum 1/9/25:  Pt had repeat US carotid and results are stable.  He also established care with new vascular provider and pt is considered optimized for surgery from vascular standpoint; no additional testing required.    Caprini: 8    Risk assessment complete.  Patient is scheduled for a intermediate surgical risk procedure.        Preoperative medication instructions were provided and reviewed with the patient.  Any additional testing or evaluation was explained to the patient.  Nothing by mouth instructions were discussed and patient's questions were answered prior to conclusion to this encounter.  Patient verbalized understanding of preoperative instructions given in preadmission testing; discharge instructions available in EMR.    This note was dictated " by a speech recognition.  Minor errors may have been detected in a speech recognition.

## 2025-01-07 NOTE — PREPROCEDURE INSTRUCTIONS
Medication List            Accurate as of January 7, 2025  7:54 AM. Always use your most recent med list.                acetaminophen 650 mg ER tablet  Commonly known as: Tylenol 8 HOUR  Medication Adjustments for Surgery: Take on the morning of surgery  Notes to patient: If needed     aspirin 81 mg EC tablet  Medication Adjustments for Surgery: Take on the morning of surgery     chlorhexidine 0.12 % solution  Commonly known as: Peridex  Swish for 30 seconds and spit 15mL of solution the night before and morning of surgery  Medication Adjustments for Surgery: Take/Use as prescribed     levothyroxine 112 mcg tablet  Commonly known as: Synthroid, Levoxyl  Take 1 tablet (112 mcg) by mouth once daily.  Medication Adjustments for Surgery: Take on the morning of surgery     olmesartan 20 mg tablet  Commonly known as: BENIcar  Take 1 tablet (20 mg) by mouth once daily.  Notes to patient: HOLD evening prior to surgery and morning of surgery        pravastatin 80 mg tablet  Commonly known as: Pravachol  Take 1 tablet (80 mg) by mouth once daily. as directed  Medication Adjustments for Surgery: Take on the morning of surgery                         Thank you for visiting [facility] today for your pre-procedure evaluation, you were seen by Kay Munoz PA-C     This summary includes instructions and information to aid you during your perioperative period.  Please read carefully. If you have any questions about your visit today, please call the number listed above.  If you become ill or have any changes to your health before your surgery, please contact your primary care provider and alert your surgeon.    Preparing for your Surgery       Exercises  Preoperative Deep Breathing Exercises  Why it is important to do deep breathing exercises before my surgery?  Deep breathing exercises strengthen your breathing muscles.  This helps you to recover after your surgery and decreases the chance of  breathing complications.  How are the deep breathing exercises done?  Sit straight with your back supported.  Breathe in deeply and slowly through your nose. Your lower rib cage should expand and your abdomen may move forward.  Hold that breath for 3 to 5 seconds.  Breathe out through pursed lips, slowly and completely.  Rest and repeat 10 times every hour while awake.  Rest longer if you become dizzy or lightheaded.       Incentive Spirometer   You were provided with an incentive spirometer in [facility], please follow the below instructions.   You were not provided an incentive spirometer in [facility], please disregard the incentive spirometer instructions  What is an incentive spirometer?  An incentive spirometer is a device used before and after surgery to “exercise” your lungs.  It helps you to take deeper breaths to expand your lungs.  Below is an example of a basic incentive spirometer.  The device you receive may differ slightly but they all function the same.    Why do I need to use an incentive spirometer?  Using your incentive spirometer prepares your lungs for surgery and helps prevent lung problems after surgery.  How do I use my incentive spirometer?  When you're using your incentive spirometer, make sure to breathe through your mouth. If you breathe through your nose, the incentive spirometer won't work properly. You can hold your nose if you have trouble.  If you feel dizzy at any time, stop and rest. Try again at a later time.  Follow the steps below:  Set up your incentive spirometer, expand the flexible tubing and connect to the outlet.  Sit upright in a chair or bed. Hold the incentive spirometer at eye level.   Put the mouthpiece in your mouth and close your lips tightly around it. Slowly breathe out (exhale) completely.  Breathe in (inhale) slowly through your mouth as deeply as you can. As you take a breath, you will see the piston rise inside the large column. While the piston rises, the indicator  should move upwards. It should stay in between the 2 arrows (see Figure).  Try to get the piston as high as you can, while keeping the indicator between the arrows.   If the indicator doesn't stay between the arrows, you're breathing either too fast or too slow.  When you get it as high as you can, hold your breath for 10 seconds, or as long as possible. While you're holding your breath, the piston will slowly fall to the base of the spirometer.  Once the piston reaches the bottom of the spirometer, breathe out slowly through your mouth. Rest for a few seconds.  Repeat 10 times. Try to get the piston to the same level with each breath.  Repeat every hour while awake  You can carefully clean the outside of the mouthpiece with an alcohol wipe or soap and water.      Preoperative Brain Exercises    What are brain exercises?  A brain exercise is any activity that engages your thinking (cognitive) skills.    What types of activities are considered brain exercises?  Jigsaw puzzles, crossword puzzles, word jumble, memory games, word search, and many more.  Many can be found free online or on your phone via a mobile nisreen.    Why should I do brain exercises before my surgery?  More recent research has shown brain exercise before surgery can lower the risk of postoperative delirium (confusion) which can be especially important for older adults.  Patients who did brain exercises for 5 to 10 hours the days before surgery, cut their risk of postoperative delirium in half up to 1 week after surgery.           **Concerning above medication instructions, if medication is normally taken at night, continue normal schedule.**  **DO NOT TAKE NIGHT PRIOR AND MORNING OF SURGERY**    CONTACT SURGEON'S OFFICE IF YOU DEVELOP:  * Fever = 100.4 F   * New respiratory symptoms (e.g. cough, shortness of breath, respiratory distress, sore throat)  * Recent loss of taste or smell  *Flu like symptoms such as headache, fatigue or gastrointestinal  symptoms  * You develop any open sores, shingles, burning or painful urination   AND/OR:  * You no longer wish to have the surgery.  * Any other personal circumstances change that may lead to the need to cancel or defer this surgery.  *You were admitted to any hospital within one week of your planned procedure.    SMOKING:  *Quitting smoking can make a huge difference to your health and recovery from surgery.    *If you need help with quitting, call 5-232-QUIT-NOW.    THE DAY OF SURGERY:  *Do not eat any food after midnight the night before surgery.   *You must drink 13.5 ounces of clear liquids (i.e. water, black coffee (no milk or cream), tea, apple juice or electrolyte drinks (gatorade)) 2 hours before your arrival time.  *You may chew gum until 2 hours before your surgery    SURGICAL TIME  *You will be contacted between 2 p.m. and 6 p.m. the business day before your surgery with your arrival time.  *If you haven't received a call by 6pm, call [phone].  *Scheduled surgery times may change and you will be notified if this occurs-check your personal voicemail for any updates.    ON THE MORNING OF SURGERY:  *Wear comfortable, loose fitting clothing.   *Do not use moisturizers, creams, lotions or perfume.  *All jewelry and valuables should be left at home.  *Prosthetic devices such as contact lenses, hearing aids, dentures, eyelash extensions, hairpins and body piercing must be removed before surgery.    BRING WITH YOU:  *Photo ID and insurance card  *Current list of medicines and allergies  *Pacemaker/Defibrillator/Heart stent cards  *CPAP machine and mask  *Slings/splints/crutches  *Copy of your complete Advanced Directive/DHPOA-if applicable  *Neurostimulator implant remote    PARKING AND ARRIVAL:  *Check in at the Main Entrance desk and let them know you are here for surgery.  *You will be directed to the 2nd floor surgical waiting area.    AFTER OUTPATIENT SURGERY:  *A responsible adult MUST accompany you  at the time of discharge and stay with you for 24 hours after your surgery.  *You may NOT drive yourself home after surgery.  *You may use a taxi or ride sharing service ([employer], [employer]) to return home ONLY if you are accompanied by a friend or family member.  *Instructions for resuming your medications will be provided by your surgeon.         Patient Information: Oral/Dental Rinse  **This is a prescription; pick it up at your preferred local pharmacy **  What is oral/dental rinse?   It is a mouthwash. It is a way of cleaning the mouth with a germ killing solution before your surgery.  The solution contains chlorhexidine, commonly known as CHG.   It is used inside the mouth to kill a bacteria known as Staphylococcus aureus.  Let your doctor know if you are allergic to Chlorhexidine.    Why do I need to use CHG oral/dental rinse?  The CHG oral/dental rinse helps to kill a bacteria in your mouth known a Staphylococcus aureus.     This reduces the risk of infection at the surgical site.      Using your CHG oral/dental rinse  STEPS:  Use your CHG oral/dental rinse after you brush your teeth the night before (at bedtime) and the morning of your surgery.  Follow all directions on your prescription label.    Use the cap on the container to measure 15ml (fill cap to fill line)  Swish (gargle if you can) the mouthwash in your mouth for at least 30 seconds, (do not to swallow) spit out  After you use your CHG rinse, do not rinse your mouth with water, drink or eat.  Please refer to prescription label for the appropriate time to resume oral intake  Dental rinse comes in one size bottle: 473ml ~16oz.  You will have leftover    rinse, discard after this use.    What side effects might I have using the CHG oral/dental rinse?  CHG rinse will stick to plaque on the teeth.  Brush and floss just before use.  Teeth brushing will help avoid staining of plaque during use.    Who should I contact if I have questions about the CHG  oral/dental rinse?  Please call [facility], Preadmission Testing at [phone] if you have any questions      Home Preoperative Antibacterial Shower     What is a home preoperative antibacterial shower?  This shower is a way of cleaning the skin with a germ killing soap before surgery.  The soap contains chlorhexidine, commonly known as CHG.  CHG is a soap for your skin with germ killing ability.  Let your doctor know if you are allergic to chlorhexidine.    Why do I need to take a preoperative antibacterial shower?  Skin is not sterile.  It is best to try to make your skin as free of germs as possible before surgery.  Proper cleansing with a germ killing soap before surgery can lower the number of germs on your skin.  This helps to reduce the risk of infection at the surgical site.  Following the instructions listed below will help you prepare your skin for surgery.      How do I use the CHG skin cleanser?  Steps:  Begin using your CHG soap five days before your scheduled surgery on ________________________.    Days 1-4 Shower before bed:  Wash your face and genitals with your normal soap and rinse.    Wash and rinse your hair using the CHG soap. Rinse completely, do not condition your   Hair.          3.    Apply the CHG soap to a clean wet washcloth.  Turn the water off or move away                From the water spray to avoid premature rinsing of the CHG soap as you are applying.     4.   Lather your entire body from the neck down.  Do not use on your face or genitals.   Pay special attention to the area(s) where your incision(s) will be located unless they are on your face.  Avoid scrubbing your skin too hard.  The important point is to have the CHG soap sit on your skin for 3 minutes.    When the 3 minutes are up, turn on the water and rinse the CHG soap off your body completely.   Pat yourself dry with a clean, freshly-laundered towel.  Dress in clean, freshly laundered night  clothes.    Be sure to sleep with clean, freshly laundered sheets.  Day 5:  Last shower is the morning before surgery: Follow above Instructions.    NOTE:    *Hair extensions should be removed    *Keep CHG soap out of eyes and ear canals   *DO NOT wash with regular soap on your body after you have used the CHG        soap solution  *DO NOT apply powders, lotions, or perfume.  *Deodorant may be used days 1-4, BUT NOT the day of surgery    Who should I contact if I have any questions regarding the use of CHG soap?  Call the [facility], Preadmission Testing at [phone] if you have any questions.              Patient Information: Pre-Operative Infection Prevention Measures     Why did I have my nose, under my arms and groin swabbed?  The purpose of the swab is to identify Staphylococcus aureus inside your nose or on your skin.  The swab was sent to the laboratory for culture.  A positive swab/culture for Staphylococcus aureus is called colonization or carriage.      What is Staphylococcus aureus?  Staphylococcus aureus, also known as “staph”, is a germ found on the skin or in the nose of healthy people.  Sometimes Staphylococcus aureus can get into the body and cause an infection.  This can be minor (such as pimples, boils or other skin problems).  It might also be serious (such as blood infection, pneumonia or a surgical site infection).    What is Staphylococcus aureus colonization or carriage?  Colonization or carriage means that a person has the germ but is not sick from it.  These bacteria can be spread on the hands or when breathing or sneezing.    How is Staphylococcus aureus spread?  It is most often spread by close contact with a person or item that carries it.    What happens if my culture is positive for Staphylococcus aureus?  Your doctor/medical team will use this information to guide any antibiotic treatment which may be necessary.  Regardless of the culture results, we  will clean the inside of your nose with a betadine swab just before you have your surgery.      Will I get an infection if I have Staphylococcus aureus in my nose or on my skin?  Anyone can get an infection with Staphylococcus aureus.  However, the best way to reduce your risk of infection is to follow the instructions provided to you for the use of your CHG soap and dental rinse.        Who should I contact if I have any questions?  Call the [facility], Preadmission Testing at [phone] if you have any questions.

## 2025-01-08 ENCOUNTER — OFFICE VISIT (OUTPATIENT)
Dept: VASCULAR SURGERY | Facility: HOSPITAL | Age: 75
End: 2025-01-08
Payer: MEDICARE

## 2025-01-08 ENCOUNTER — APPOINTMENT (OUTPATIENT)
Dept: VASCULAR SURGERY | Facility: HOSPITAL | Age: 75
End: 2025-01-08
Payer: MEDICARE

## 2025-01-08 VITALS
HEART RATE: 87 BPM | WEIGHT: 232 LBS | BODY MASS INDEX: 34.36 KG/M2 | OXYGEN SATURATION: 96 % | SYSTOLIC BLOOD PRESSURE: 123 MMHG | DIASTOLIC BLOOD PRESSURE: 73 MMHG | HEIGHT: 69 IN

## 2025-01-08 DIAGNOSIS — I65.23 BILATERAL CAROTID ARTERY STENOSIS: Primary | ICD-10-CM

## 2025-01-08 PROCEDURE — 1036F TOBACCO NON-USER: CPT | Performed by: NURSE PRACTITIONER

## 2025-01-08 PROCEDURE — 1157F ADVNC CARE PLAN IN RCRD: CPT | Performed by: NURSE PRACTITIONER

## 2025-01-08 PROCEDURE — 1126F AMNT PAIN NOTED NONE PRSNT: CPT | Performed by: NURSE PRACTITIONER

## 2025-01-08 PROCEDURE — 1123F ACP DISCUSS/DSCN MKR DOCD: CPT | Performed by: NURSE PRACTITIONER

## 2025-01-08 PROCEDURE — 99213 OFFICE O/P EST LOW 20 MIN: CPT | Performed by: NURSE PRACTITIONER

## 2025-01-08 PROCEDURE — 99203 OFFICE O/P NEW LOW 30 MIN: CPT | Performed by: NURSE PRACTITIONER

## 2025-01-08 PROCEDURE — 3078F DIAST BP <80 MM HG: CPT | Performed by: NURSE PRACTITIONER

## 2025-01-08 PROCEDURE — 1159F MED LIST DOCD IN RCRD: CPT | Performed by: NURSE PRACTITIONER

## 2025-01-08 PROCEDURE — 3074F SYST BP LT 130 MM HG: CPT | Performed by: NURSE PRACTITIONER

## 2025-01-08 RX ORDER — FERROUS SULFATE 325(65) MG
325 TABLET, DELAYED RELEASE (ENTERIC COATED) ORAL DAILY
COMMUNITY

## 2025-01-08 ASSESSMENT — COLUMBIA-SUICIDE SEVERITY RATING SCALE - C-SSRS
6. HAVE YOU EVER DONE ANYTHING, STARTED TO DO ANYTHING, OR PREPARED TO DO ANYTHING TO END YOUR LIFE?: NO
1. IN THE PAST MONTH, HAVE YOU WISHED YOU WERE DEAD OR WISHED YOU COULD GO TO SLEEP AND NOT WAKE UP?: NO
2. HAVE YOU ACTUALLY HAD ANY THOUGHTS OF KILLING YOURSELF?: NO

## 2025-01-08 ASSESSMENT — PAIN SCALES - GENERAL: PAINLEVEL_OUTOF10: 0-NO PAIN

## 2025-01-08 ASSESSMENT — PATIENT HEALTH QUESTIONNAIRE - PHQ9
SUM OF ALL RESPONSES TO PHQ9 QUESTIONS 1 AND 2: 0
2. FEELING DOWN, DEPRESSED OR HOPELESS: NOT AT ALL
1. LITTLE INTEREST OR PLEASURE IN DOING THINGS: NOT AT ALL

## 2025-01-08 NOTE — PROGRESS NOTES
Vascular Surgery Clinic Note    CC: New patient visit carotid artery stenosis    History Of Present Illness:   Sae Dan is a 74 y.o. male here for initial evaluation.  He has a history of left carotid endarterectomy performed in November 2012 followed by left carotid stenting in December 2012 by Dr. Yu after presenting with strokelike symptoms.  His right carotid artery is known to be occluded.  He currently denies amaurosis fugax, unilateral weakness/numbness or speech impairments.  His blood pressure slightly elevated.  He does not smoke.    He denies arm claudication or dizziness with use of the arms.  He is right-handed.    He denies claudication symptoms.    Medical History:  Patient Active Problem List   Diagnosis    ACE-inhibitor cough    Anti-RNP antibodies present    Bilateral carotid artery stenosis    Carotid artery occlusion    Hyperlipidemia    Hypertension    Hypothyroidism    Intermittent dysphagia    Osteoarthritis of lumbar spine    Prostate cancer (Multi)    Burns antibody positive    Stage 3a chronic kidney disease (Multi)    Umbilical hernia without obstruction and without gangrene    COPD (chronic obstructive pulmonary disease) (Multi)    Generalized osteoarthritis of multiple sites    Pulmonary nodule    Medicare annual wellness visit, subsequent    Cerebral infarction, unspecified    Abnormal electrocardiogram (ECG) (EKG)    History of appendectomy    Leukocytosis    Constipation    Peripheral vascular disease, unspecified (CMS-HCC)    Physical exam, annual    Abnormal liver ultrasound    Chronic left shoulder pain    Double vision    Incisional hernia, without obstruction or gangrene    Complete tear of left rotator cuff    Traumatic complete tear of left rotator cuff    Biceps tendinitis of left shoulder    Impingement syndrome of left shoulder    Labral tear of shoulder, left, initial encounter        SH:    Social Drivers of Health     Tobacco Use: Medium Risk (1/8/2025)     "Patient History     Smoking Tobacco Use: Former     Smokeless Tobacco Use: Never     Passive Exposure: Past   Alcohol Use: Not on file   Financial Resource Strain: Not on file   Food Insecurity: Not on file   Transportation Needs: Not on file   Physical Activity: Not on file   Stress: Not on file   Social Connections: Not on file   Intimate Partner Violence: Not on file   Depression: Not at risk (1/8/2025)    PHQ-2     PHQ-2 Score: 0   Housing Stability: Not on file   Utilities: Not on file   Digital Equity: Not on file   Health Literacy: Not on file        FH:  Family History   Problem Relation Name Age of Onset    Cancer Mother      Cataracts Father      Cancer Father      Stroke Father      Glaucoma Other grandmother         Allergies:   Allergies   Allergen Reactions    Morphine Itching and Rash     Burning skin       ROS:  All systems were reviewed and noted to be negative, other than described above.     Objective:  Last Recorded Vitals  Blood pressure 123/73, pulse 87, height 1.753 m (5' 9\"), weight 105 kg (232 lb), SpO2 96%.    Meds:   Current Outpatient Medications   Medication Instructions    acetaminophen (TYLENOL 8 HOUR) 650 mg, Every 8 hours PRN    aspirin 81 mg, Daily    chlorhexidine (Peridex) 0.12 % solution Swish for 30 seconds and spit 15mL of solution the night before and morning of surgery    ferrous sulfate 325 mg, Daily    levothyroxine (SYNTHROID, LEVOXYL) 112 mcg, oral, Daily    olmesartan (BENICAR) 20 mg, oral, Daily    pravastatin (PRAVACHOL) 80 mg, oral, Daily, as directed       Exam:  Constitutional: Well appearing, NAD   PSYCH: Appropriate mood and affect  Eyes: Sclera clear  Neck: Supple, left neck incision healed  CV: No tachycardia  RESP: Unlabored breathing  GI: Soft, nontender, non-distended  SKIN: No lesions  NEURO: No focal deficits noted  EXTREMITIES: Warm & well perfused. No leg edema. No evidence of arterial ischemia. No evidence of venous insufficiency.   PULSES: Normal upper " extremity pulse exam except nonpalpable right carotid pulse.  Palpable right PT and left DP.    Imaging Reviewed:  Vascular US Carotid Artery Duplex Bilateral 01/07/2025    Narrative  Interpreted By:  Bekah Sweet,  STUDY:  [id number]; 1/7/2025 2:59 pm    INDICATION:  Signs/Symptoms:h/o R ICA occlusion (I65.29) and s/p prior left  carotid endarterectomy (I65.29):    COMPARISON:  05/05/2021    ACCESSION NUMBER(S):  [id number]    ORDERING CLINICIAN:  RODNEY RASHEED    TECHNIQUE:  Carotid Examination using B-mode, color flow and doppler spectral  analysis.    FINDINGS:  RIGHT CAROTID SYSTEM  DCCA PSV/EDV: 80.1 cm/s/8.9 cm/sec  ECA PSV: 0 cm/sec  PICA PSV/EDV: 0 / 0 cm/sec  LILI PSV/EDV: 0 / 0 cm/sec  DICA PSV/EDV: 0 / 0 cm/sec  JAROD/VCC Ratio: Not applicable  VERT : Antegrade    LEFT CAROTID SYSTEM  DCCA PSV/EDV: 134.6 cm/s/33.7 cm/sec  ECA PSV: 158.3 cm/s cm/sec  PICA PSV/EDV: 122/28.4 cm/sec  LILI PSV/EDV: 126.2/32.6 cm/sec  DICA PSV/EDV: 96.8/34.7 cm/s cm/sec  JAROD/VCC Ratio: 0.9  VERT : Antegrade    DUPLEX IMAGES:  Right Carotid System: There is no color flow seen throughout the  right internal carotid artery with the right external carotid artery  lacking color flow as well.    Left Carotid System: There is an endovascular stent within the distal  left common carotid artery without occlusion. A small amount of  plaque is seen within the proximal left internal carotid artery.    The estimate of the degree of stenosis included in this report is  based on the NASCET method for calculating stenosis, using the  internal carotid artery distal to the stenosis as the reference point.    Impression  There is a known complete occlusion at the origin of the right  internal carotid artery with no flow seen in the right external  carotid artery on this study.    The stent graft within the distal left common carotid artery is  patent with no occlusion of the stent. There is small amount of  plaque within left internal carotid artery  producing less than 50%  stenosis.    MACRO:  None.    Signed by: Bekah Sweet 1/7/2025 4:34 PM  Dictation workstation:   [id number]      Assessment & Plan:  1. Bilateral carotid artery stenosis  Vascular US Carotid Artery Duplex Bilateral        Carotid artery stenosis status post left CEA (11/2012) and left carotid stenting (12/2012).   Right carotid artery is known to be occluded.  Recent carotid duplex demonstrates known right carotid occlusion and less than 50% left ICA stenosis.  He is currently asymptomatic.  He may proceed with hernia repair.  Continue aspirin and statin.  Return to clinic in 1 year with carotid duplex.    Ashia Burns, APRN-CNP

## 2025-01-09 LAB
ACHR BIND AB SER-SCNC: 0 NMOL/L (ref 0–0.4)
ACHR BLOCK AB/ACHR TOTAL SFR SER: 0 % (ref 0–26)
STAPHYLOCOCCUS SPEC CULT: ABNORMAL
TSH RECEP AB SER-ACNC: <1.1 IU/L

## 2025-01-10 LAB
ACHR MOD AB/ACHR TOTAL SFR SER: 0 %
THYROID STIMULATING IMMUNOGLOBULIN: <89 % BASELINE

## 2025-01-14 ENCOUNTER — TELEPHONE (OUTPATIENT)
Dept: ORTHOPEDIC SURGERY | Facility: CLINIC | Age: 75
End: 2025-01-14
Payer: MEDICARE

## 2025-01-14 DIAGNOSIS — M75.122 COMPLETE TEAR OF LEFT ROTATOR CUFF, UNSPECIFIED WHETHER TRAUMATIC: ICD-10-CM

## 2025-01-15 ENCOUNTER — HOSPITAL ENCOUNTER (OUTPATIENT)
Facility: HOSPITAL | Age: 75
Setting detail: OUTPATIENT SURGERY
Discharge: HOME | End: 2025-01-15
Attending: SURGERY | Admitting: SURGERY
Payer: MEDICARE

## 2025-01-15 ENCOUNTER — ANESTHESIA EVENT (OUTPATIENT)
Dept: OPERATING ROOM | Facility: HOSPITAL | Age: 75
End: 2025-01-15
Payer: MEDICARE

## 2025-01-15 ENCOUNTER — PHARMACY VISIT (OUTPATIENT)
Dept: PHARMACY | Facility: CLINIC | Age: 75
End: 2025-01-15
Payer: COMMERCIAL

## 2025-01-15 ENCOUNTER — ANESTHESIA (OUTPATIENT)
Dept: OPERATING ROOM | Facility: HOSPITAL | Age: 75
End: 2025-01-15
Payer: MEDICARE

## 2025-01-15 VITALS
OXYGEN SATURATION: 93 % | SYSTOLIC BLOOD PRESSURE: 102 MMHG | HEART RATE: 54 BPM | WEIGHT: 230.38 LBS | RESPIRATION RATE: 14 BRPM | HEIGHT: 69 IN | DIASTOLIC BLOOD PRESSURE: 65 MMHG | BODY MASS INDEX: 34.12 KG/M2 | TEMPERATURE: 97.3 F

## 2025-01-15 DIAGNOSIS — K43.2 INCISIONAL HERNIA, WITHOUT OBSTRUCTION OR GANGRENE: Primary | ICD-10-CM

## 2025-01-15 PROCEDURE — 2500000005 HC RX 250 GENERAL PHARMACY W/O HCPCS: Performed by: STUDENT IN AN ORGANIZED HEALTH CARE EDUCATION/TRAINING PROGRAM

## 2025-01-15 PROCEDURE — 7100000001 HC RECOVERY ROOM TIME - INITIAL BASE CHARGE: Performed by: SURGERY

## 2025-01-15 PROCEDURE — C1781 MESH (IMPLANTABLE): HCPCS | Performed by: SURGERY

## 2025-01-15 PROCEDURE — RXMED WILLOW AMBULATORY MEDICATION CHARGE

## 2025-01-15 PROCEDURE — 2500000004 HC RX 250 GENERAL PHARMACY W/ HCPCS (ALT 636 FOR OP/ED): Mod: JZ | Performed by: SURGERY

## 2025-01-15 PROCEDURE — 2500000001 HC RX 250 WO HCPCS SELF ADMINISTERED DRUGS (ALT 637 FOR MEDICARE OP): Performed by: STUDENT IN AN ORGANIZED HEALTH CARE EDUCATION/TRAINING PROGRAM

## 2025-01-15 PROCEDURE — 3700000001 HC GENERAL ANESTHESIA TIME - INITIAL BASE CHARGE: Performed by: SURGERY

## 2025-01-15 PROCEDURE — 2500000004 HC RX 250 GENERAL PHARMACY W/ HCPCS (ALT 636 FOR OP/ED)

## 2025-01-15 PROCEDURE — 2500000004 HC RX 250 GENERAL PHARMACY W/ HCPCS (ALT 636 FOR OP/ED): Performed by: ANESTHESIOLOGY

## 2025-01-15 PROCEDURE — 3600000017 HC OR TIME - EACH INCREMENTAL 1 MINUTE - PROCEDURE LEVEL SIX: Performed by: SURGERY

## 2025-01-15 PROCEDURE — A49593 PR RPR AA HERNIA 1ST 3-10 CM REDUCIBLE: Performed by: STUDENT IN AN ORGANIZED HEALTH CARE EDUCATION/TRAINING PROGRAM

## 2025-01-15 PROCEDURE — A49593 PR RPR AA HERNIA 1ST 3-10 CM REDUCIBLE: Performed by: ANESTHESIOLOGY

## 2025-01-15 PROCEDURE — 2780000003 HC OR 278 NO HCPCS: Performed by: SURGERY

## 2025-01-15 PROCEDURE — 2500000004 HC RX 250 GENERAL PHARMACY W/ HCPCS (ALT 636 FOR OP/ED): Performed by: STUDENT IN AN ORGANIZED HEALTH CARE EDUCATION/TRAINING PROGRAM

## 2025-01-15 PROCEDURE — 99100 ANES PT EXTEME AGE<1 YR&>70: CPT | Performed by: STUDENT IN AN ORGANIZED HEALTH CARE EDUCATION/TRAINING PROGRAM

## 2025-01-15 PROCEDURE — 2500000004 HC RX 250 GENERAL PHARMACY W/ HCPCS (ALT 636 FOR OP/ED): Performed by: SURGERY

## 2025-01-15 PROCEDURE — 2720000007 HC OR 272 NO HCPCS: Performed by: SURGERY

## 2025-01-15 PROCEDURE — 7100000002 HC RECOVERY ROOM TIME - EACH INCREMENTAL 1 MINUTE: Performed by: SURGERY

## 2025-01-15 PROCEDURE — 3700000002 HC GENERAL ANESTHESIA TIME - EACH INCREMENTAL 1 MINUTE: Performed by: SURGERY

## 2025-01-15 PROCEDURE — 49593 RPR AA HRN 1ST 3-10 RDC: CPT | Performed by: SURGERY

## 2025-01-15 PROCEDURE — 7100000010 HC PHASE TWO TIME - EACH INCREMENTAL 1 MINUTE: Performed by: SURGERY

## 2025-01-15 PROCEDURE — 7100000009 HC PHASE TWO TIME - INITIAL BASE CHARGE: Performed by: SURGERY

## 2025-01-15 PROCEDURE — 3600000018 HC OR TIME - INITIAL BASE CHARGE - PROCEDURE LEVEL SIX: Performed by: SURGERY

## 2025-01-15 PROCEDURE — 2500000005 HC RX 250 GENERAL PHARMACY W/O HCPCS: Performed by: SURGERY

## 2025-01-15 DEVICE — LAPAROSCOPIC SELF-FIXATING MESH POLYESTER WITH POLYLACTIC ACID GRIPS AND COLLAGEN FILM
Type: IMPLANTABLE DEVICE | Site: ABDOMEN | Status: FUNCTIONAL
Brand: PROGRIP

## 2025-01-15 RX ORDER — PHENYLEPHRINE HCL IN 0.9% NACL 0.4MG/10ML
SYRINGE (ML) INTRAVENOUS AS NEEDED
Status: DISCONTINUED | OUTPATIENT
Start: 2025-01-15 | End: 2025-01-15

## 2025-01-15 RX ORDER — FENTANYL CITRATE 50 UG/ML
INJECTION, SOLUTION INTRAMUSCULAR; INTRAVENOUS AS NEEDED
Status: DISCONTINUED | OUTPATIENT
Start: 2025-01-15 | End: 2025-01-15

## 2025-01-15 RX ORDER — PROPOFOL 10 MG/ML
INJECTION, EMULSION INTRAVENOUS AS NEEDED
Status: DISCONTINUED | OUTPATIENT
Start: 2025-01-15 | End: 2025-01-15

## 2025-01-15 RX ORDER — HYDROCODONE BITARTRATE AND ACETAMINOPHEN 5; 325 MG/1; MG/1
1 TABLET ORAL EVERY 6 HOURS PRN
Qty: 12 TABLET | Refills: 0 | Status: SHIPPED | OUTPATIENT
Start: 2025-01-15 | End: 2025-01-22

## 2025-01-15 RX ORDER — ACETAMINOPHEN 325 MG/1
TABLET ORAL AS NEEDED
Status: DISCONTINUED | OUTPATIENT
Start: 2025-01-15 | End: 2025-01-15

## 2025-01-15 RX ORDER — OXYCODONE HYDROCHLORIDE 5 MG/1
10 TABLET ORAL EVERY 4 HOURS PRN
Status: DISCONTINUED | OUTPATIENT
Start: 2025-01-15 | End: 2025-01-15 | Stop reason: HOSPADM

## 2025-01-15 RX ORDER — ONDANSETRON HYDROCHLORIDE 2 MG/ML
INJECTION, SOLUTION INTRAVENOUS AS NEEDED
Status: DISCONTINUED | OUTPATIENT
Start: 2025-01-15 | End: 2025-01-15

## 2025-01-15 RX ORDER — SODIUM CHLORIDE, SODIUM LACTATE, POTASSIUM CHLORIDE, CALCIUM CHLORIDE 600; 310; 30; 20 MG/100ML; MG/100ML; MG/100ML; MG/100ML
100 INJECTION, SOLUTION INTRAVENOUS CONTINUOUS
Status: ACTIVE | OUTPATIENT
Start: 2025-01-15 | End: 2025-01-15

## 2025-01-15 RX ORDER — WATER 1 ML/ML
IRRIGANT IRRIGATION AS NEEDED
Status: DISCONTINUED | OUTPATIENT
Start: 2025-01-15 | End: 2025-01-15 | Stop reason: HOSPADM

## 2025-01-15 RX ORDER — LIDOCAINE HYDROCHLORIDE 20 MG/ML
INJECTION, SOLUTION INFILTRATION; PERINEURAL AS NEEDED
Status: DISCONTINUED | OUTPATIENT
Start: 2025-01-15 | End: 2025-01-15

## 2025-01-15 RX ORDER — ROCURONIUM BROMIDE 10 MG/ML
INJECTION, SOLUTION INTRAVENOUS AS NEEDED
Status: DISCONTINUED | OUTPATIENT
Start: 2025-01-15 | End: 2025-01-15

## 2025-01-15 RX ORDER — GABAPENTIN 300 MG/1
CAPSULE ORAL AS NEEDED
Status: DISCONTINUED | OUTPATIENT
Start: 2025-01-15 | End: 2025-01-15

## 2025-01-15 RX ORDER — LABETALOL HYDROCHLORIDE 5 MG/ML
5 INJECTION, SOLUTION INTRAVENOUS ONCE AS NEEDED
Status: DISCONTINUED | OUTPATIENT
Start: 2025-01-15 | End: 2025-01-15 | Stop reason: HOSPADM

## 2025-01-15 RX ORDER — CEFAZOLIN SODIUM 2 G/100ML
2 INJECTION, SOLUTION INTRAVENOUS ONCE
Status: COMPLETED | OUTPATIENT
Start: 2025-01-15 | End: 2025-01-15

## 2025-01-15 RX ORDER — ACETAMINOPHEN 325 MG/1
650 TABLET ORAL EVERY 4 HOURS PRN
Status: DISCONTINUED | OUTPATIENT
Start: 2025-01-15 | End: 2025-01-15 | Stop reason: HOSPADM

## 2025-01-15 RX ORDER — BUPIVACAINE HYDROCHLORIDE 5 MG/ML
INJECTION, SOLUTION PERINEURAL AS NEEDED
Status: DISCONTINUED | OUTPATIENT
Start: 2025-01-15 | End: 2025-01-15 | Stop reason: HOSPADM

## 2025-01-15 RX ORDER — POLYETHYLENE GLYCOL 3350 17 G/17G
17 POWDER, FOR SOLUTION ORAL DAILY PRN
Qty: 10 PACKET | Refills: 0 | Status: SHIPPED | OUTPATIENT
Start: 2025-01-15

## 2025-01-15 RX ORDER — IBUPROFEN 600 MG/1
600 TABLET ORAL EVERY 6 HOURS PRN
Qty: 20 TABLET | Refills: 0 | Status: SHIPPED | OUTPATIENT
Start: 2025-01-15

## 2025-01-15 RX ORDER — LIDOCAINE HYDROCHLORIDE 10 MG/ML
0.1 INJECTION, SOLUTION EPIDURAL; INFILTRATION; INTRACAUDAL; PERINEURAL ONCE
Status: DISCONTINUED | OUTPATIENT
Start: 2025-01-15 | End: 2025-01-15 | Stop reason: HOSPADM

## 2025-01-15 RX ORDER — ONDANSETRON HYDROCHLORIDE 2 MG/ML
4 INJECTION, SOLUTION INTRAVENOUS ONCE AS NEEDED
Status: DISCONTINUED | OUTPATIENT
Start: 2025-01-15 | End: 2025-01-15 | Stop reason: HOSPADM

## 2025-01-15 RX ORDER — DEXMEDETOMIDINE IN 0.9 % NACL 20 MCG/5ML
SYRINGE (ML) INTRAVENOUS AS NEEDED
Status: DISCONTINUED | OUTPATIENT
Start: 2025-01-15 | End: 2025-01-15

## 2025-01-15 RX ORDER — CELECOXIB 200 MG/1
CAPSULE ORAL AS NEEDED
Status: DISCONTINUED | OUTPATIENT
Start: 2025-01-15 | End: 2025-01-15

## 2025-01-15 RX ORDER — OXYCODONE HYDROCHLORIDE 5 MG/1
5 TABLET ORAL EVERY 4 HOURS PRN
Status: DISCONTINUED | OUTPATIENT
Start: 2025-01-15 | End: 2025-01-15 | Stop reason: HOSPADM

## 2025-01-15 RX ADMIN — GABAPENTIN 300 MG: 300 CAPSULE ORAL at 11:37

## 2025-01-15 RX ADMIN — CEFAZOLIN SODIUM 2 G: 2 INJECTION, SOLUTION INTRAVENOUS at 14:09

## 2025-01-15 RX ADMIN — PROPOFOL 150 MG: 10 INJECTION, EMULSION INTRAVENOUS at 14:04

## 2025-01-15 RX ADMIN — Medication 12 MCG: at 15:06

## 2025-01-15 RX ADMIN — FENTANYL CITRATE 50 MCG: 50 INJECTION, SOLUTION INTRAMUSCULAR; INTRAVENOUS at 15:52

## 2025-01-15 RX ADMIN — ONDANSETRON 4 MG: 2 INJECTION, SOLUTION INTRAMUSCULAR; INTRAVENOUS at 15:36

## 2025-01-15 RX ADMIN — PROPOFOL 50 MG: 10 INJECTION, EMULSION INTRAVENOUS at 15:45

## 2025-01-15 RX ADMIN — DEXAMETHASONE SODIUM PHOSPHATE 8 MG: 4 INJECTION, SOLUTION INTRAMUSCULAR; INTRAVENOUS at 14:14

## 2025-01-15 RX ADMIN — CELECOXIB 200 MG: 200 CAPSULE ORAL at 11:37

## 2025-01-15 RX ADMIN — Medication 4 L/MIN: at 16:15

## 2025-01-15 RX ADMIN — Medication 8 MCG: at 15:36

## 2025-01-15 RX ADMIN — SUGAMMADEX 200 MG: 100 INJECTION, SOLUTION INTRAVENOUS at 15:52

## 2025-01-15 RX ADMIN — ROCURONIUM BROMIDE 20 MG: 10 INJECTION, SOLUTION INTRAVENOUS at 14:46

## 2025-01-15 RX ADMIN — SODIUM CHLORIDE, POTASSIUM CHLORIDE, SODIUM LACTATE AND CALCIUM CHLORIDE: 600; 310; 30; 20 INJECTION, SOLUTION INTRAVENOUS at 13:50

## 2025-01-15 RX ADMIN — Medication 200 MCG: at 15:25

## 2025-01-15 RX ADMIN — ACETAMINOPHEN 975 MG: 325 TABLET, FILM COATED ORAL at 11:37

## 2025-01-15 RX ADMIN — FENTANYL CITRATE 50 MCG: 50 INJECTION, SOLUTION INTRAMUSCULAR; INTRAVENOUS at 15:46

## 2025-01-15 RX ADMIN — ROCURONIUM BROMIDE 50 MG: 10 INJECTION, SOLUTION INTRAVENOUS at 14:04

## 2025-01-15 RX ADMIN — FENTANYL CITRATE 100 MCG: 50 INJECTION, SOLUTION INTRAMUSCULAR; INTRAVENOUS at 14:04

## 2025-01-15 RX ADMIN — ROCURONIUM BROMIDE 10 MG: 10 INJECTION, SOLUTION INTRAVENOUS at 15:12

## 2025-01-15 RX ADMIN — LIDOCAINE HYDROCHLORIDE 50 MG: 20 INJECTION, SOLUTION INFILTRATION; PERINEURAL at 14:04

## 2025-01-15 SDOH — HEALTH STABILITY: MENTAL HEALTH: CURRENT SMOKER: 0

## 2025-01-15 ASSESSMENT — PAIN SCALES - GENERAL
PAINLEVEL_OUTOF10: 0 - NO PAIN

## 2025-01-15 ASSESSMENT — PAIN - FUNCTIONAL ASSESSMENT
PAIN_FUNCTIONAL_ASSESSMENT: UNABLE TO SELF-REPORT
PAIN_FUNCTIONAL_ASSESSMENT: 0-10
PAIN_FUNCTIONAL_ASSESSMENT: UNABLE TO SELF-REPORT
PAIN_FUNCTIONAL_ASSESSMENT: 0-10

## 2025-01-15 NOTE — ANESTHESIA PROCEDURE NOTES
Airway  Date/Time: 1/15/2025 2:07 PM  Urgency: elective    Airway not difficult    Staffing  Performed: attending   Authorized by: Oswald Santana MD    Performed by: Tigre Mejía RN  Patient location during procedure: OR    Indications and Patient Condition  Indications for airway management: anesthesia and airway protection  Spontaneous Ventilation: absent  Sedation level: deep  Preoxygenated: yes  Patient position: sniffing  Mask difficulty assessment: 2 - vent by mask + OA or adjuvant +/- NMBA    Final Airway Details  Final airway type: endotracheal airway      Successful airway: ETT  Cuffed: yes   Successful intubation technique: video laryngoscopy  Facilitating devices/methods: intubating stylet  Endotracheal tube insertion site: oral  Blade: Reynaldo  Blade size: #4  ETT size (mm): 7.5  Cormack-Lehane Classification: grade IIb - view of arytenoids or posterior of glottis only  Placement verified by: chest auscultation and capnometry   Measured from: lips  ETT to lips (cm): 23  Number of attempts at approach: 1    Additional Comments  Intubated by anesthesiologist with Benz 4. Atraumatic intubation.

## 2025-01-15 NOTE — OP NOTE
Robotic Assisted Incisional Hernia Repair; Mesh Placement Operative Note     Date: 1/15/2025  OR Location: [facility]    Name: Sae Dan, : 1950, Age: 74 y.o., MRN: [id number], Sex: male    Diagnosis  Pre-op Diagnosis      * Incisional hernia, without obstruction or gangrene [K43.2] Post-op Diagnosis     * Incisional hernia, without obstruction or gangrene [K43.2]     Procedures  Robotic Assisted Incisional Hernia Repair; Mesh Placement  [id number] - [facility] AA HERNIA 1ST 3-10 CM REDUCIBLE      Surgeons      * Ilia Brooks - Primary    Resident/Fellow/Other Assistant:  Surgeons and Role:  * No surgeons found with a matching role *  [id number]  Staff:   Circulator: Maren  Scrub Person: Kodak Childers Circulator: Emma Childers Scrub: Alie    Anesthesia Staff: Anesthesiologist: David Mahcuca MD; Armando Boston DO  CRNA: MIRANDA Gonzalez-ALONDRA  SRNA: Tigre Mejía RN    Procedure Summary  Anesthesia: General  ASA: III  Estimated Blood Loss: 2mL  Intra-op Medications:   Administrations occurring from 1300 to 1530 on 01/15/25:   Medication Name Total Dose   BUPivacaine HCl (Marcaine) 0.5 % (5 mg/mL) injection 20 mL   sterile water irrigation solution 1,000 mL   dexAMETHasone (Decadron) injection 4 mg/mL 8 mg   dexMEDETOMidine 4 mcg/mL in NS syringe 12 mcg   fentaNYL (Sublimaze) injection 50 mcg/mL 100 mcg   LR bolus Cannot be calculated   lidocaine (Xylocaine) injection 2 % 50 mg   phenylephrine 40 mcg/mL syringe 10 mL 200 mcg   propofol (Diprivan) injection 10 mg/mL 150 mg   rocuronium (ZeMuron) 50 mg/5 mL injection 80 mg   ceFAZolin (Ancef) 2 g in dextrose (iso)  mL 2 g              Anesthesia Record               Intraprocedure I/O Totals          Intake    LR bolus 800.00 mL    Total Intake 800 mL          Specimen: No specimens collected              Drains and/or Catheters: * None in log *    Tourniquet Times:         Implants:  Implants       Type Name Action Serial No.      Surgical Mesh  Sling Implant MESH, PROGRIP LAP, 10 X 15 CM, [facility] - [id number] Implanted               Findings: Periumbilical incisional hernia 3.5 x 4 cm    Indications: Sae Dan is an 74 y.o. male who is having surgery for Incisional hernia, without obstruction or gangrene [K43.2].     The patient was seen in the preoperative area. The risks, benefits, complications, treatment options, non-operative alternatives, expected recovery and outcomes were discussed with the patient. The possibilities of reaction to medication, pulmonary aspiration, injury to surrounding structures, bleeding, recurrent infection, the need for additional procedures, failure to diagnose a condition, and creating a complication requiring transfusion or operation were discussed with the patient. The patient concurred with the proposed plan, giving informed consent.  The site of surgery was properly noted/marked if necessary per policy. The patient has been actively warmed in preoperative area. Preoperative antibiotics have been ordered and given within 1 hours of incision. Venous thrombosis prophylaxis theraplay supine.   have been ordered including bilateral sequential compression devices    Procedure Details: Timeout was performed to confirm the patient procedure laterality.  Antibiotics are given general anesthesia administered through endotracheal tube.  Left arm was tucked and the table was flexed.  We electrically clipped prepped and draped the abdominal wall sterilely.  Injected Marcaine made subxiphoid incision with scalpel.  Fascia was incised later peritoneal cavity balloon port and insufflated.  Robotic 30 degree camera was introduced.  I placed 3 left lateral 8 mm robotic ports.  We docked the robot and went to the console.  Went ahead and prepared for transabdominal preperitoneal repair.  Peritoneal flap was initiated at the lateral edge of the left rectus muscle and I worked my way across the midline to the lateral edge of the  right rectus muscle and beyond.  In doing so reduced preperitoneal fat from periumbilical defect along with hernia sac.  Once the preperitoneal pocket had been sufficiently developed we closed the hernia to reestablish the linea alba with running #1 nonabsorbable V-Loc suture.  We then introduced a 10 x 12 cm ProGrip mesh.  This was placed with the self affixing side against the abdominal wall.  It was further secured with some interrupted 3-0 Vicryl suture.  Peritoneal flap was then closed with running 3 0 strata fix device to cover up the mesh.  The hole in the peritoneum at the umbilicus was closed with 3-0 Vicryl.  All the needles were accounted for and removed.  We then undocked the robot and removed our ports under direct visualization.  The subxiphoid fascia was closed with 0 Vicryl.  Skin incisions were closed with 4-0 Monocryl and Dermabond patient went to recovery in satisfactory condition.    Complications:  None; patient tolerated the procedure well.    Disposition: PACU - hemodynamically stable.  Condition: stable                 Additional Details:     Attending Attestation: I was present for the entire procedure.    Ilia Brooks  Phone Number: [phone]

## 2025-01-15 NOTE — ANESTHESIA PREPROCEDURE EVALUATION
"Patient: Sae Dan    Procedure Information       Date/Time: 01/15/25 1300    Procedure: Robotic Assisted Incisional Hernia Repair; Mesh Placement (Abdomen)    Location: [facility] A OR 08 / [facility] OR    Surgeons: Iila Brooks MD        HPI: 74 year old male presenting for robot assisted hernia repair. History significant for COPD not on O2, HTN, DLD, carotid artery disease    Denies recent cough, cold, runny nose, fever, flu like symptoms. No exertional angina nor exertional dyspnea. No orthopnea, paroxysmal nocturnal dyspnea, leg swelling. Denies palpitations. No issues with bleeding nor blood clots.    Anesthesia history: no issues    Visit Vitals  /87   Pulse 75   Temp 36.6 °C (97.9 °F) (Temporal)   Resp 18   Ht 1.753 m (5' 9\")   Wt 104 kg (230 lb 6.1 oz)   SpO2 95%   BMI 34.02 kg/m²   Smoking Status Former   BSA 2.26 m²        [unfilled]      No results found for this or any previous visit from the past 1095 days.       Encounter Date: 01/07/25   ECG 12 lead (Clinic Performed)   Result Value    Ventricular Rate 69    Atrial Rate 69    VT Interval 152    QRS Duration 130    QT Interval 404    QTC Calculation(Bazett) 432    P Axis 43    R Axis 42    T Axis 157    QRS Count 12    Q Onset 226    P Onset 150    P Offset 201    T Offset 428    QTC Fredericia 423    Narrative    Normal sinus rhythm  Possible Left atrial enlargement  Nonspecific intraventricular block  T wave abnormality, consider inferolateral ischemia  Abnormal ECG  When compared with ECG of 10-FEB-2023 08:56,  Nonspecific intraventricular block has replaced Right bundle branch block  Confirmed by Jax Cooper ([id number]) on 1/7/2025 9:10:22 AM        Stress test: IMPRESSION:  1. Negative myocardial perfusion study without evidence of inducible  myocardial ischemia or prior infarction.  2. The left ventricle is normal in size.  3. Normal LV wall motion with a post-stress LV EF estimated at 57%.      Relevant Problems   Cardiac   (+) " Abnormal electrocardiogram (ECG) (EKG)   (+) Hyperlipidemia   (+) Hypertension   (+) Peripheral vascular disease, unspecified (CMS-HCC)      Pulmonary   (+) COPD (chronic obstructive pulmonary disease) (Multi)      Neuro   (+) Bilateral carotid artery stenosis   (+) Carotid artery occlusion      GI   (+) Intermittent dysphagia      /Renal   (+) Prostate cancer (Multi)      Endocrine   (+) Hypothyroidism      Musculoskeletal   (+) Generalized osteoarthritis of multiple sites   (+) Osteoarthritis of lumbar spine       Clinical information reviewed:   Tobacco  Allergies  Meds   Med Hx  Surg Hx   Fam Hx  Soc Hx        NPO Detail:  NPO/Void Status  Carbohydrate Drink Given Prior to Surgery? : N  Date of Last Liquid: 01/15/25  Time of Last Liquid: 0630  Date of Last Solid: 01/14/25  Time of Last Solid: 1900  Last Intake Type: Clear fluids  Time of Last Void: 1129         Physical Exam    Airway  Mallampati: II  TM distance: >3 FB  Neck ROM: full     Cardiovascular   Rhythm: regular  Rate: normal     Dental   (+) lower dentures, upper dentures     Pulmonary   Breath sounds clear to auscultation     Abdominal   (+) obese  Abdomen: soft             Anesthesia Plan    History of general anesthesia?: yes  History of complications of general anesthesia?: no    ASA 3     general     The patient is not a current smoker.    intravenous induction   Postoperative administration of opioids is intended.  Trial extubation is planned.  Anesthetic plan and risks discussed with patient.  Use of blood products discussed with patient who consented to blood products.    Plan discussed with CRNA.

## 2025-01-16 ASSESSMENT — PAIN SCALES - GENERAL: PAINLEVEL_OUTOF10: 2

## 2025-01-21 ENCOUNTER — ANESTHESIA (OUTPATIENT)
Dept: OPERATING ROOM | Facility: HOSPITAL | Age: 75
End: 2025-01-21
Payer: MEDICARE

## 2025-01-21 ENCOUNTER — HOSPITAL ENCOUNTER (OUTPATIENT)
Facility: HOSPITAL | Age: 75
Setting detail: OUTPATIENT SURGERY
Discharge: HOME | End: 2025-01-21
Attending: ORTHOPAEDIC SURGERY | Admitting: ORTHOPAEDIC SURGERY
Payer: MEDICARE

## 2025-01-21 ENCOUNTER — PHARMACY VISIT (OUTPATIENT)
Dept: PHARMACY | Facility: CLINIC | Age: 75
End: 2025-01-21
Payer: COMMERCIAL

## 2025-01-21 ENCOUNTER — APPOINTMENT (OUTPATIENT)
Dept: CARDIOLOGY | Facility: HOSPITAL | Age: 75
End: 2025-01-21
Payer: MEDICARE

## 2025-01-21 VITALS
DIASTOLIC BLOOD PRESSURE: 60 MMHG | SYSTOLIC BLOOD PRESSURE: 111 MMHG | OXYGEN SATURATION: 94 % | TEMPERATURE: 98.1 F | HEART RATE: 72 BPM | RESPIRATION RATE: 21 BRPM

## 2025-01-21 DIAGNOSIS — M75.22 BICEPS TENDINITIS OF LEFT SHOULDER: ICD-10-CM

## 2025-01-21 DIAGNOSIS — S46.012A TRAUMATIC COMPLETE TEAR OF LEFT ROTATOR CUFF, INITIAL ENCOUNTER: ICD-10-CM

## 2025-01-21 DIAGNOSIS — M75.42 IMPINGEMENT SYNDROME OF LEFT SHOULDER: ICD-10-CM

## 2025-01-21 DIAGNOSIS — S46.012D TRAUMATIC COMPLETE TEAR OF LEFT ROTATOR CUFF, SUBSEQUENT ENCOUNTER: ICD-10-CM

## 2025-01-21 DIAGNOSIS — S43.432A LABRAL TEAR OF SHOULDER, LEFT, INITIAL ENCOUNTER: ICD-10-CM

## 2025-01-21 DIAGNOSIS — M75.122 COMPLETE TEAR OF LEFT ROTATOR CUFF, UNSPECIFIED WHETHER TRAUMATIC: Primary | ICD-10-CM

## 2025-01-21 LAB
ATRIAL RATE: 75 BPM
ERYTHROCYTE [DISTWIDTH] IN BLOOD BY AUTOMATED COUNT: 13.2 % (ref 11.5–14.5)
HCT VFR BLD AUTO: 40.1 % (ref 41–52)
HGB BLD-MCNC: 13.1 G/DL (ref 13.5–17.5)
MCH RBC QN AUTO: 29.6 PG (ref 26–34)
MCHC RBC AUTO-ENTMCNC: 32.7 G/DL (ref 32–36)
MCV RBC AUTO: 91 FL (ref 80–100)
NRBC BLD-RTO: 0 /100 WBCS (ref 0–0)
P AXIS: 77 DEGREES
PLATELET # BLD AUTO: 192 X10*3/UL (ref 150–450)
PR INTERVAL: 160 MS
Q ONSET: 249 MS
QRS COUNT: 12 BEATS
QRS DURATION: 142 MS
QT INTERVAL: 383 MS
QTC CALCULATION(BAZETT): 425 MS
QTC FREDERICIA: 411 MS
R AXIS: 34 DEGREES
RBC # BLD AUTO: 4.42 X10*6/UL (ref 4.5–5.9)
T AXIS: 151 DEGREES
T OFFSET: 441 MS
VENTRICULAR RATE: 74 BPM
WBC # BLD AUTO: 7.5 X10*3/UL (ref 4.4–11.3)

## 2025-01-21 PROCEDURE — A4649 SURGICAL SUPPLIES: HCPCS | Performed by: ORTHOPAEDIC SURGERY

## 2025-01-21 PROCEDURE — 2500000004 HC RX 250 GENERAL PHARMACY W/ HCPCS (ALT 636 FOR OP/ED): Performed by: ANESTHESIOLOGY

## 2025-01-21 PROCEDURE — 2500000004 HC RX 250 GENERAL PHARMACY W/ HCPCS (ALT 636 FOR OP/ED): Performed by: NURSE ANESTHETIST, CERTIFIED REGISTERED

## 2025-01-21 PROCEDURE — 36415 COLL VENOUS BLD VENIPUNCTURE: CPT | Performed by: ANESTHESIOLOGY

## 2025-01-21 PROCEDURE — 29823 SHO ARTHRS SRG XTNSV DBRDMT: CPT | Performed by: ORTHOPAEDIC SURGERY

## 2025-01-21 PROCEDURE — C1713 ANCHOR/SCREW BN/BN,TIS/BN: HCPCS | Performed by: ORTHOPAEDIC SURGERY

## 2025-01-21 PROCEDURE — 93005 ELECTROCARDIOGRAM TRACING: CPT | Mod: 59

## 2025-01-21 PROCEDURE — 2780000003 HC OR 278 NO HCPCS: Performed by: ORTHOPAEDIC SURGERY

## 2025-01-21 PROCEDURE — 3600000009 HC OR TIME - EACH INCREMENTAL 1 MINUTE - PROCEDURE LEVEL FOUR: Performed by: ORTHOPAEDIC SURGERY

## 2025-01-21 PROCEDURE — 2500000001 HC RX 250 WO HCPCS SELF ADMINISTERED DRUGS (ALT 637 FOR MEDICARE OP): Performed by: ANESTHESIOLOGY

## 2025-01-21 PROCEDURE — 7100000002 HC RECOVERY ROOM TIME - EACH INCREMENTAL 1 MINUTE: Performed by: ORTHOPAEDIC SURGERY

## 2025-01-21 PROCEDURE — 7100000009 HC PHASE TWO TIME - INITIAL BASE CHARGE: Performed by: ORTHOPAEDIC SURGERY

## 2025-01-21 PROCEDURE — 2720000007 HC OR 272 NO HCPCS: Performed by: ORTHOPAEDIC SURGERY

## 2025-01-21 PROCEDURE — 29827 SHO ARTHRS SRG RT8TR CUF RPR: CPT

## 2025-01-21 PROCEDURE — 3700000002 HC GENERAL ANESTHESIA TIME - EACH INCREMENTAL 1 MINUTE: Performed by: ORTHOPAEDIC SURGERY

## 2025-01-21 PROCEDURE — 29827 SHO ARTHRS SRG RT8TR CUF RPR: CPT | Performed by: ORTHOPAEDIC SURGERY

## 2025-01-21 PROCEDURE — 2500000005 HC RX 250 GENERAL PHARMACY W/O HCPCS: Performed by: ANESTHESIOLOGY

## 2025-01-21 PROCEDURE — 29823 SHO ARTHRS SRG XTNSV DBRDMT: CPT

## 2025-01-21 PROCEDURE — 2500000004 HC RX 250 GENERAL PHARMACY W/ HCPCS (ALT 636 FOR OP/ED)

## 2025-01-21 PROCEDURE — 2500000002 HC RX 250 W HCPCS SELF ADMINISTERED DRUGS (ALT 637 FOR MEDICARE OP, ALT 636 FOR OP/ED): Performed by: ANESTHESIOLOGY

## 2025-01-21 PROCEDURE — 2500000005 HC RX 250 GENERAL PHARMACY W/O HCPCS

## 2025-01-21 PROCEDURE — 3700000001 HC GENERAL ANESTHESIA TIME - INITIAL BASE CHARGE: Performed by: ORTHOPAEDIC SURGERY

## 2025-01-21 PROCEDURE — RXMED WILLOW AMBULATORY MEDICATION CHARGE

## 2025-01-21 PROCEDURE — 85027 COMPLETE CBC AUTOMATED: CPT | Performed by: ANESTHESIOLOGY

## 2025-01-21 PROCEDURE — 93010 ELECTROCARDIOGRAM REPORT: CPT | Performed by: INTERNAL MEDICINE

## 2025-01-21 PROCEDURE — 7100000001 HC RECOVERY ROOM TIME - INITIAL BASE CHARGE: Performed by: ORTHOPAEDIC SURGERY

## 2025-01-21 PROCEDURE — 7100000010 HC PHASE TWO TIME - EACH INCREMENTAL 1 MINUTE: Performed by: ORTHOPAEDIC SURGERY

## 2025-01-21 PROCEDURE — 3600000004 HC OR TIME - INITIAL BASE CHARGE - PROCEDURE LEVEL FOUR: Performed by: ORTHOPAEDIC SURGERY

## 2025-01-21 PROCEDURE — 29828 SHO ARTHRS SRG BICP TENODSIS: CPT

## 2025-01-21 PROCEDURE — 29826 SHO ARTHRS SRG DECOMPRESSION: CPT

## 2025-01-21 PROCEDURE — 29828 SHO ARTHRS SRG BICP TENODSIS: CPT | Performed by: ORTHOPAEDIC SURGERY

## 2025-01-21 PROCEDURE — 29826 SHO ARTHRS SRG DECOMPRESSION: CPT | Performed by: ORTHOPAEDIC SURGERY

## 2025-01-21 PROCEDURE — 2500000004 HC RX 250 GENERAL PHARMACY W/ HCPCS (ALT 636 FOR OP/ED): Performed by: ORTHOPAEDIC SURGERY

## 2025-01-21 DEVICE — IMPLANTABLE DEVICE: Type: IMPLANTABLE DEVICE | Site: SHOULDER | Status: FUNCTIONAL

## 2025-01-21 DEVICE — MILAGRO ADVANCE INTERFERENCE SCREW ABSORBABLE - TCP/PLGA 8 X 23MM
Type: IMPLANTABLE DEVICE | Site: SHOULDER | Status: FUNCTIONAL
Brand: MILAGRO

## 2025-01-21 DEVICE — ANCHOR, VERSALOOP, 2 SUTURE, 2.5MM: Type: IMPLANTABLE DEVICE | Site: SHOULDER | Status: FUNCTIONAL

## 2025-01-21 DEVICE — EXPRESSEW III AUTOCAPTURE+ LOADING TOOL/RETENTION PLATE FOR USE WITH E3AC+ FLEXIBLE SUTURE PASSER
Type: IMPLANTABLE DEVICE | Site: SHOULDER | Status: FUNCTIONAL
Brand: EXPRESSEW

## 2025-01-21 RX ORDER — METOCLOPRAMIDE HYDROCHLORIDE 5 MG/ML
10 INJECTION INTRAMUSCULAR; INTRAVENOUS ONCE
Status: COMPLETED | OUTPATIENT
Start: 2025-01-21 | End: 2025-01-21

## 2025-01-21 RX ORDER — FENTANYL CITRATE 50 UG/ML
INJECTION, SOLUTION INTRAMUSCULAR; INTRAVENOUS AS NEEDED
Status: DISCONTINUED | OUTPATIENT
Start: 2025-01-21 | End: 2025-01-21

## 2025-01-21 RX ORDER — ASPIRIN 325 MG
325 TABLET, DELAYED RELEASE (ENTERIC COATED) ORAL 2 TIMES DAILY
Qty: 60 TABLET | Refills: 0 | Status: SHIPPED | OUTPATIENT
Start: 2025-01-21 | End: 2025-02-20

## 2025-01-21 RX ORDER — IPRATROPIUM BROMIDE AND ALBUTEROL SULFATE 2.5; .5 MG/3ML; MG/3ML
3 SOLUTION RESPIRATORY (INHALATION) ONCE
Status: COMPLETED | OUTPATIENT
Start: 2025-01-21 | End: 2025-01-21

## 2025-01-21 RX ORDER — CEFAZOLIN SODIUM 3 G/150ML
3 INJECTION, SOLUTION INTRAVENOUS ONCE
Status: COMPLETED | OUTPATIENT
Start: 2025-01-21 | End: 2025-01-21

## 2025-01-21 RX ORDER — SODIUM CHLORIDE, SODIUM LACTATE, POTASSIUM CHLORIDE, CALCIUM CHLORIDE 600; 310; 30; 20 MG/100ML; MG/100ML; MG/100ML; MG/100ML
INJECTION, SOLUTION INTRAVENOUS CONTINUOUS PRN
Status: DISCONTINUED | OUTPATIENT
Start: 2025-01-21 | End: 2025-01-21

## 2025-01-21 RX ORDER — OXYCODONE HYDROCHLORIDE 5 MG/1
5 TABLET ORAL EVERY 6 HOURS PRN
Qty: 28 TABLET | Refills: 0 | Status: SHIPPED | OUTPATIENT
Start: 2025-01-21

## 2025-01-21 RX ORDER — PROPOFOL 10 MG/ML
INJECTION, EMULSION INTRAVENOUS AS NEEDED
Status: DISCONTINUED | OUTPATIENT
Start: 2025-01-21 | End: 2025-01-21

## 2025-01-21 RX ORDER — FAMOTIDINE 10 MG/ML
20 INJECTION INTRAVENOUS ONCE
Status: COMPLETED | OUTPATIENT
Start: 2025-01-21 | End: 2025-01-21

## 2025-01-21 RX ORDER — ROPIVACAINE HYDROCHLORIDE 5 MG/ML
INJECTION, SOLUTION EPIDURAL; INFILTRATION; PERINEURAL AS NEEDED
Status: DISCONTINUED | OUTPATIENT
Start: 2025-01-21 | End: 2025-01-21

## 2025-01-21 RX ORDER — MIDAZOLAM HYDROCHLORIDE 1 MG/ML
INJECTION, SOLUTION INTRAMUSCULAR; INTRAVENOUS AS NEEDED
Status: DISCONTINUED | OUTPATIENT
Start: 2025-01-21 | End: 2025-01-21

## 2025-01-21 RX ORDER — LIDOCAINE HYDROCHLORIDE 20 MG/ML
INJECTION, SOLUTION EPIDURAL; INFILTRATION; INTRACAUDAL; PERINEURAL AS NEEDED
Status: DISCONTINUED | OUTPATIENT
Start: 2025-01-21 | End: 2025-01-21

## 2025-01-21 RX ORDER — ACETAMINOPHEN 500 MG
1000 TABLET ORAL EVERY 8 HOURS PRN
Qty: 90 TABLET | Refills: 0 | Status: SHIPPED | OUTPATIENT
Start: 2025-01-21

## 2025-01-21 RX ORDER — SODIUM CITRATE AND CITRIC ACID MONOHYDRATE 334; 500 MG/5ML; MG/5ML
30 SOLUTION ORAL ONCE
Status: COMPLETED | OUTPATIENT
Start: 2025-01-21 | End: 2025-01-21

## 2025-01-21 RX ORDER — ONDANSETRON HYDROCHLORIDE 2 MG/ML
4 INJECTION, SOLUTION INTRAVENOUS ONCE
Status: COMPLETED | OUTPATIENT
Start: 2025-01-21 | End: 2025-01-21

## 2025-01-21 RX ORDER — DOCUSATE SODIUM 100 MG/1
100 CAPSULE, LIQUID FILLED ORAL 2 TIMES DAILY PRN
Qty: 28 CAPSULE | Refills: 0 | Status: SHIPPED | OUTPATIENT
Start: 2025-01-21 | End: 2025-02-04

## 2025-01-21 RX ORDER — GABAPENTIN 300 MG/1
300 CAPSULE ORAL NIGHTLY
Qty: 5 CAPSULE | Refills: 0 | Status: SHIPPED | OUTPATIENT
Start: 2025-01-21 | End: 2025-01-26

## 2025-01-21 RX ORDER — LIDOCAINE HCL/PF 100 MG/5ML
SYRINGE (ML) INTRAVENOUS AS NEEDED
Status: DISCONTINUED | OUTPATIENT
Start: 2025-01-21 | End: 2025-01-21

## 2025-01-21 RX ORDER — ROCURONIUM BROMIDE 10 MG/ML
INJECTION, SOLUTION INTRAVENOUS AS NEEDED
Status: DISCONTINUED | OUTPATIENT
Start: 2025-01-21 | End: 2025-01-21

## 2025-01-21 RX ORDER — SODIUM CHLORIDE 9 MG/ML
20 INJECTION, SOLUTION INTRAVENOUS CONTINUOUS
Status: DISCONTINUED | OUTPATIENT
Start: 2025-01-21 | End: 2025-01-21 | Stop reason: HOSPADM

## 2025-01-21 RX ORDER — NORETHINDRONE AND ETHINYL ESTRADIOL 0.5-0.035
KIT ORAL AS NEEDED
Status: DISCONTINUED | OUTPATIENT
Start: 2025-01-21 | End: 2025-01-21

## 2025-01-21 RX ORDER — PHENYLEPHRINE HCL IN 0.9% NACL 0.4MG/10ML
SYRINGE (ML) INTRAVENOUS AS NEEDED
Status: DISCONTINUED | OUTPATIENT
Start: 2025-01-21 | End: 2025-01-21

## 2025-01-21 RX ORDER — MELOXICAM 15 MG/1
15 TABLET ORAL DAILY PRN
Qty: 30 TABLET | Refills: 0 | Status: SHIPPED | OUTPATIENT
Start: 2025-01-21 | End: 2025-02-20

## 2025-01-21 RX ORDER — ONDANSETRON HYDROCHLORIDE 2 MG/ML
4 INJECTION, SOLUTION INTRAVENOUS ONCE AS NEEDED
Status: DISCONTINUED | OUTPATIENT
Start: 2025-01-21 | End: 2025-01-21 | Stop reason: HOSPADM

## 2025-01-21 RX ADMIN — FAMOTIDINE 20 MG: 10 INJECTION, SOLUTION INTRAVENOUS at 09:30

## 2025-01-21 RX ADMIN — PROPOFOL 30 MG: 10 INJECTION, EMULSION INTRAVENOUS at 11:16

## 2025-01-21 RX ADMIN — Medication 50 MCG: at 11:34

## 2025-01-21 RX ADMIN — LIDOCAINE HYDROCHLORIDE 100 MG: 20 INJECTION, SOLUTION INTRAVENOUS at 11:07

## 2025-01-21 RX ADMIN — SODIUM CHLORIDE, POTASSIUM CHLORIDE, SODIUM LACTATE AND CALCIUM CHLORIDE: 600; 310; 30; 20 INJECTION, SOLUTION INTRAVENOUS at 11:02

## 2025-01-21 RX ADMIN — SUGAMMADEX 200 MG: 100 INJECTION, SOLUTION INTRAVENOUS at 13:19

## 2025-01-21 RX ADMIN — Medication 100 MCG: at 12:19

## 2025-01-21 RX ADMIN — IPRATROPIUM BROMIDE AND ALBUTEROL SULFATE 3 ML: 2.5; .5 SOLUTION RESPIRATORY (INHALATION) at 09:32

## 2025-01-21 RX ADMIN — DEXAMETHASONE SODIUM PHOSPHATE 4 MG: 4 INJECTION, SOLUTION INTRAMUSCULAR; INTRAVENOUS at 10:31

## 2025-01-21 RX ADMIN — Medication 100 MCG: at 12:52

## 2025-01-21 RX ADMIN — Medication 100 MCG: at 11:44

## 2025-01-21 RX ADMIN — Medication 50 MCG: at 11:28

## 2025-01-21 RX ADMIN — Medication 100 MCG: at 12:31

## 2025-01-21 RX ADMIN — LIDOCAINE HYDROCHLORIDE 40 MG: 20 INJECTION, SOLUTION EPIDURAL; INFILTRATION; INTRACAUDAL; PERINEURAL at 10:31

## 2025-01-21 RX ADMIN — FENTANYL CITRATE 50 MCG: 50 INJECTION INTRAMUSCULAR; INTRAVENOUS at 10:11

## 2025-01-21 RX ADMIN — Medication 100 MCG: at 12:26

## 2025-01-21 RX ADMIN — FENTANYL CITRATE 25 MCG: 50 INJECTION INTRAMUSCULAR; INTRAVENOUS at 12:26

## 2025-01-21 RX ADMIN — PROPOFOL 120 MG: 10 INJECTION, EMULSION INTRAVENOUS at 11:07

## 2025-01-21 RX ADMIN — PROPOFOL 20 MG: 10 INJECTION, EMULSION INTRAVENOUS at 12:46

## 2025-01-21 RX ADMIN — SODIUM CITRATE AND CITRIC ACID MONOHYDRATE 30 ML: 500; 334 SOLUTION ORAL at 09:30

## 2025-01-21 RX ADMIN — FENTANYL CITRATE 50 MCG: 50 INJECTION INTRAMUSCULAR; INTRAVENOUS at 12:50

## 2025-01-21 RX ADMIN — Medication 100 MCG: at 12:07

## 2025-01-21 RX ADMIN — EPHEDRINE SULFATE 5 MG: 50 INJECTION, SOLUTION INTRAVENOUS at 12:59

## 2025-01-21 RX ADMIN — Medication 3 L/MIN: at 10:00

## 2025-01-21 RX ADMIN — CEFAZOLIN SODIUM 3 G: 3 INJECTION, SOLUTION INTRAVENOUS at 11:02

## 2025-01-21 RX ADMIN — SODIUM CHLORIDE 20 ML/HR: 9 INJECTION, SOLUTION INTRAVENOUS at 09:54

## 2025-01-21 RX ADMIN — Medication 100 MCG: at 12:22

## 2025-01-21 RX ADMIN — PROPOFOL 30 MG: 10 INJECTION, EMULSION INTRAVENOUS at 11:17

## 2025-01-21 RX ADMIN — FENTANYL CITRATE 25 MCG: 50 INJECTION INTRAMUSCULAR; INTRAVENOUS at 12:29

## 2025-01-21 RX ADMIN — MIDAZOLAM 1 MG: 1 INJECTION INTRAMUSCULAR; INTRAVENOUS at 10:11

## 2025-01-21 RX ADMIN — DEXAMETHASONE SODIUM PHOSPHATE 8 MG: 4 INJECTION, SOLUTION INTRAMUSCULAR; INTRAVENOUS at 11:16

## 2025-01-21 RX ADMIN — METOCLOPRAMIDE 10 MG: 5 INJECTION, SOLUTION INTRAMUSCULAR; INTRAVENOUS at 09:32

## 2025-01-21 RX ADMIN — Medication 100 MCG: at 11:55

## 2025-01-21 RX ADMIN — ROCURONIUM BROMIDE 50 MG: 10 INJECTION, SOLUTION INTRAVENOUS at 11:07

## 2025-01-21 RX ADMIN — Medication 100 MCG: at 12:36

## 2025-01-21 RX ADMIN — ONDANSETRON 4 MG: 2 INJECTION INTRAMUSCULAR; INTRAVENOUS at 13:05

## 2025-01-21 RX ADMIN — ROPIVACAINE HYDROCHLORIDE 20 ML: 5 INJECTION, SOLUTION EPIDURAL; INFILTRATION; PERINEURAL at 10:31

## 2025-01-21 RX ADMIN — Medication 100 MCG: at 11:59

## 2025-01-21 SDOH — HEALTH STABILITY: MENTAL HEALTH: CURRENT SMOKER: 0

## 2025-01-21 ASSESSMENT — PAIN - FUNCTIONAL ASSESSMENT

## 2025-01-21 ASSESSMENT — PAIN SCALES - GENERAL
PAINLEVEL_OUTOF10: 0 - NO PAIN
PAINLEVEL_OUTOF10: 3
PAINLEVEL_OUTOF10: 0 - NO PAIN
PAIN_LEVEL: 0
PAINLEVEL_OUTOF10: 0 - NO PAIN

## 2025-01-21 NOTE — DISCHARGE INSTRUCTIONS
Wound Care  Your dressing should remain intact and dry until your post op visit in office. No showering until seen in clinic.-sitting in bathtub to sponge bathe is ok. Place a protective cover (large garbage bag) over your shoulder (with sling on) while sponge bathing. Do NOT immerse your operative shoulder in bath or pool water.     Biceps Tenodesis  You must keep your sling on while bathing to prevent active motion of your elbow.  While sponge bathing keep your operative arm at your side, you are not to raise or reach with your arm during the first 6 weeks following surgery. Lifting or raising your arm under your own strength could cause damage to your surgically repaired shoulder.     Surgical Dressing     If your dressing becomes soiled or damp, you may remove the dressing and replace the bandage. Please do not remove steri-strips (small pieces of tape) covering your incisions, if present. Please be certain to wash hands thoroughly prior to changing dressing, do not place any ointments over incisions.     Sling/Immobilizer     Anesthesia effects can last up until 48 hours after surgery.  Please be aware that you may experience numbness in your arm for up to 48 hours after surgery.  During this time it is extremely important that you keep your sling in place at all times because you will not have control of your arm due to the anesthesia effects.  Your sling with supporting abduction pillow should be worn at all times.  Maintain your elbow position against the pillow and even with your side or in front of this position to minimize stress on the repair.      Activity     When sleeping or resting, inclined positions (i.e. reclining chair) using a pillow under the forearm for support may provide better comfort  Do not engage in activities which increase pain/swelling over the first 7-10 days following surgery  Avoid long periods of sitting (without arm supported) or long distance traveling for 2 weeks  May return to  sedentary work ONLY or school 3-4 days after surgery, if pain is tolerable     Continuous icing will help to decrease swelling and provide pain relief.  You may use ice packs every 2 hours for 20 minutes daily until your first post-operative visit.  It is very important to always have protection between the ice pad and your skin.  Never place the ice pad directly on your skin; this could lead to an injury to your skin.      Driving     Please do not drive until you are evaluated in the office after surgery.  You are considered an impaired  following surgery, and if you choose to drive, your insurance may not cover any damages that may occur.     Post-operative Medication               1. Aspirin 325mg 1 tablet twice a day for 4 weeks following surgery.  Aspirin helps to reduce the risk of blood clots following surgery.      2. Colace 1 tablet twice a day.  Colace is used to prevent constipation while taking pain medication.     3.  Mobic 15 mg take 1 tablet daily for 5 days, after 5 days take 1 tablet daily only as needed for pain.     4.Tylenol 1gm every 8 hours for 5 days, after 5 days take 1gm every 8 hours only as needed for pain.     5.Gabapentin 300mg daily at bedtime for 5 days, after 5 days you should no longer need this medication.     6. Oxy IR 5mg every 6 hours ONLY TO BE TAKEN FOR SEVERE PAIN.  Do NOT take Oxy IR within 3 hours of taking Gabapentin. Do NOT take Gabapentin within 3 hours of taking Oxy IR. If you are having severe pain and taking Oxy IR at night skip the nightly Gabapentin and only resume when you are not taking Oxy IR at night     Signs and Symptoms of Complications     Although complications are rare, the following are a list of potential symptoms you should be alert for.    Infection - Increased pain not relieved with medication, fever (temperature of 101.5 degrees Fahrenheit or higher), chills, redness, swelling or drainage (yellow/brown/green) from incision.  Blood Clot - If you  experience shortness of breath, chest pain, pain in your chest with deep breaths or difficulty breathing, please report to emergency room immediately.   Persistent Pain - Severe sharp pain not relieved by pain medication.  Persistent and increasing swelling and numbness of the arm.        If a follow-up visit after surgery was not scheduled, please call Dr. Estevez office at [phone] during office business hours (Monday to Friday, 8:30am to 3:30pm) to arrange a follow-up appointment for 2 weeks after your surgery.     If you have any questions, please call Dr. Estevez office at [phone] during office business hours (Monday to Friday, 8:30am to 3:30pm).  Please do not call Dr. Estevez office after 3:30pm or on weekends or holidays.  If you have an urgent issue after 3:30pm or on weekends or holidays, please go immediately to a local emergency room to be evaluated.

## 2025-01-21 NOTE — OP NOTE
left shoulder arthroscopy, rotator cuff repair, biceps tenodesis, extensive debridement, subacromial decompression with partial acromioplasty (L) Operative Note     Date: 2025  OR Location: [facility]    Name: Sae Dan, : 1950, Age: 74 y.o., MRN: [id number], Sex: male    ORTHOPEDIC OPERATIVE REPORT / POST-OP NOTE    SURGEON:  Osmel Stratton MD  ASSISTANT(S):  Yi Ruiz PA-C  PRE-OPERATIVE DIAGNOSIS: Left shoulder traumatic full-thickness rotator cuff tear, biceps tendinopathy, labral tearing, subacromial impingement  POST-OPERATIVE DIAGNOSIS:  Same  PROCEDURE: Left shoulder arthroscopy, arthroscopic rotator cuff repair of the supraspinatus and subscapularis, arthroscopic biceps tenodesis, extensive debridement, subacromial decompression with partial acromioplasty  CPT CODE(S):  01115, 48591, 27365, 99709  ANESTHESIA:  general + regional  ESTIMATED BLOOD LOSS: Minimal  SPECIMEN:  None  FINDINGS:  Above  COMPLICATIONS:  None  CONDITION:  Stable to the Recovery Room    I, Dr Stratton, was present and scrubbed for the entire surgical procedure, including wound closure.     No qualified Orthopedic Resident was available to assist with this procedure.  Therefore, the assistance of Yi Ruiz PA-C, was required throughout this entire procedure.      Yi Ruiz PA-C, is specifically trained and experienced in assisting with this procedure.  During the procedure, she actively assisted Dr. Stratton in completing the operation safely and expeditiously by helping to provide exposure, maintain hemostasis, and served other technical functions, such as suturing, assisting in drilling, etc.    We will be billing for Yi Ruiz PA-C, as a required First Assistant for this procedure.      INDICATION FOR SURGERY:  74-year-old male with traumatic injury to his left shoulder.  We treated the patient's shoulder pain and weakness conservatively with anti-inflammatories, physical therapy, and steroid  injections.  The patient failed to have significant relief.  X-rays showed a relatively normal shoulder with no significant arthritis of the glenohumeral joint and MRI showed a full-thickness supraspinatus rotator cuff tear in the shoulder, as well as proximal biceps tendinopathy, labral tearing and subacromial impingement.  I discussed with the patient that the MRI did show a musculotendinous tear which is more concerning and has higher rate of rerupture after repair than a standard tendon to bone tear.  On physical examination, the patient had pain and weakness with rotator cuff testing, positive impingement signs, and pain over their proximal biceps tendon.  We discussed continuing conservative treatment but this had not been helping and was tried already.  We discussed a shoulder arthroscopy with rotator cuff repair, arthroscopic biceps tenodesis, extensive debridement and subacromial decompression with partial acromioplasty.  The patient understood all the risks versus benefits of operative and non-operative treatment options.  The risks of shoulder arthroscopy with rotator cuff repair were discussed, which included but were not limited to: risk of continued pain or re-tear of the rotator cuff, risks of infection, bleeding, nerve, artery, or muscle damage, risk of fracture either intra-operatively or post-operatively, risk of need for additional surgery, risk of anesthesia including risks of heart attack, stroke, or even death.  The patient wanted to proceed with surgery and signed appropriate surgical consents.  On the morning of surgery, I signed the patient's operative shoulder the preoperative holding area.      PROCEDURE:  The patient was brought to the operating room after anesthesia performed a block on the patient's operative upper extremity.  The patient was placed supine on the operating room table and all of their bony prominences were padded.  A operating room huddle was performed and the patient  received IV antibiotics.  The patient was placed into a beachchair position with all of their nancy prominences padded and SCDs were applied to the lower extremities and used throughout the procedure.   The patient's left upper extremity was prepped and draped in the normal sterile fashion.  A pre-incision timeout was called.  A shoulder arthroscopy was then performed, using the standard posterior viewing portal, anterior, anterolateral, and lateral working portals.  Throughout the shoulder arthroscopy, no loose bodies were identified.   In the glenohumeral joint, the cartilage of the humeral head was relatively well maintained.  There was some grade 2 cartilage wear of the glenoid.  The patient was found to have extensive labral tearing, with anterior and posterior labral tearing, as well as a SLAP tear.  The biceps tendon was found to be erythematous and partially torn with a macerated appearance.  The biceps tendon was then tagged with a spinal needle and cut at its insertion on the labrum.  Scar tissue was seen throughout the anterior capsule.  An extensive debridement was then performed with an arthroscopic shaver, debriding the subacromial bursa as well as the labrum tearing anteriorly and posteriorly.  The biceps anchor complex tearing was debrided down to normal healthy labrum.  The biceps tendon stump was then debrided down to normal healthy labrum.  The glenoid cartilage wear was debrided with the arthroscopic shaver.  Scarring in the anterior capsule was debrided with the arthroscopic shaver and the anterior interval was opened up with the shaver.      The subscapularis tendon was then examined.  Tearing was seen in the superior aspect of the subscapularis tendon and a repair of the subscapularis was required.  The subscapularis tearing was debrided down to healthy bleeding tissue and the footprint of the subscapularis was debrided down to healthy bleeding bone.  An all-suture anchor was impacted in the  footprint of the subscapularis.  The sutures were passed through the subscapularis tendon and tied down, completing the subscapularis rotator cuff repair.  After this was performed, a probe was used and the rotator cuff repair of the subscapularis was found to be extremely stable.      The supraspinatus tendon was then examined.  A full-thickness tear of the supraspinatus tendon was identified.  2 tears were seen in the supraspinatus tendon.  1 was found at the musculotendinous junction and was found to be mostly a tear in line with the fibers of the supraspinatus and the second tear was a tendon to bone insertional tear.  The torn tissue of the supraspinatus tendon was then debrided back to healthy bleeding tissue.    The arthroscope was then put in the subacromial bursa and a subacromial decompression was performed.  A prominent hook was found on the anterolateral aspect of the acromion, so a partial acromioplasty was performed using a angel in the cutting block technique.  The biceps tendon was pulled in the subacromial space and freed up down to the bottom of the bicipital groove, where an 8 mm tunnel was drilled.  The biceps tendon was pushed into the tunnel and secured with interference screw, completing the arthroscopic biceps tenodesis.      Attention was then turned to the torn supraspinatus tendon.  The unhealthy supraspinatus tendon tissue was debrided back to healthy, bleeding tendon tissue with an arthroscopic shaver and the footprint of the supraspinatus was debrided down to healthy bleeding bone.  The musculotendinous supraspinatus tear was carefully examined and found to have very mobile tissue with the tear being mostly in line with the fibers of the supraspinatus.  As a result of this, decision was made that we could proceed with a side-to-side marginal convergence repair of the musculotendinous tear, with the sutures running anterior to posterior to close down the musculotendinous tear without any  tension.  4 high-strength sutures were passed in a marginal convergence nature closing down the musculotendinous tear.  All 4 of the sutures were then tied and cut completing a repair of the musculotendinous supraspinatus cuff tear.  After this was performed, the tendon to bone standard style tear still needed to be repaired.  A supraspinatus rotator cuff repair was then performed for the tendon to bone tear using a double loaded all suture Versaloop anchor.  The sutures were passed up through the supraspinatus tendon and tied down, completing the rotator cuff repair of the supraspinatus.  After this was performed, a probe was used and the rotator cuff repair of the supraspinatus was found to be extremely stable.  The shoulder was also put through a full range of motion and the rotator cuff repair was found to be extremely stable.    After the shoulder arthroscopy was completed, including the rotator cuff repair, biceps tenodesis, extensive debridement, and subacromial decompression with partial acromioplasty, a complete shoulder arthroscopy was then again performed and no further pathology was identified.  The shoulder was drained of fluid and the arthroscopic portals were closed with 3-0 nylon skin sutures.  Adaptic, dry sterile dressing, and a shoulder immobilizer were applied.  The patient was awoken and brought to the recovery room in good condition.  There were no complications.

## 2025-01-21 NOTE — ANESTHESIA POSTPROCEDURE EVALUATION
Patient: Sae Dan    Procedure Summary       Date: 01/21/25 Room / Location: [facility] 03 / [facility]    Anesthesia Start: 1057 Anesthesia Stop: 1330    Procedure: left shoulder arthroscopy, rotator cuff repair, biceps tenodesis, extensive debridement, subacromial decompression with partial acromioplasty (Left: Shoulder) Diagnosis:       Complete tear of left rotator cuff, unspecified whether traumatic      Traumatic complete tear of left rotator cuff, subsequent encounter      Biceps tendinitis of left shoulder      Impingement syndrome of left shoulder      Labral tear of shoulder, left, initial encounter      (Complete tear of left rotator cuff, unspecified whether traumatic [M75.122])      (Traumatic complete tear of left rotator cuff, subsequent encounter [S46.012D])      (Biceps tendinitis of left shoulder [M75.22])      (Impingement syndrome of left shoulder [M75.42])      (Labral tear of shoulder, left, initial encounter [S43.432A])    Surgeons: KAROLINE Stratton MD Responsible Provider: GIULIANO Alexander    Anesthesia Type: general, regional ASA Status: 3            Anesthesia Type: general, regional    Vitals Value Taken Time   /60 01/21/25 1420   Temp 36.7 °C (98.1 °F) 01/21/25 1325   Pulse 66 01/21/25 1420   Resp 12 01/21/25 1420   SpO2 94 % 01/21/25 1350       Anesthesia Post Evaluation    Patient location during evaluation: PACU  Patient participation: complete - patient participated  Level of consciousness: awake and alert  Pain score: 0  Pain management: adequate  Multimodal analgesia pain management approach  Airway patency: patent  Cardiovascular status: hemodynamically stable  Respiratory status: room air  Hydration status: acceptable  Postoperative Nausea and Vomiting: none    There were no known notable events for this encounter.

## 2025-01-21 NOTE — ANESTHESIA PREPROCEDURE EVALUATION
Patient: Sae aDn    Procedure Information       Anesthesia Start Date/Time: 01/21/25 1057    Procedure: left shoulder arthroscopy, rotator cuff repair, biceps tenodesis, extensive debridement, subacromial decompression with partial acromioplasty (Mitek rep) (Left: Shoulder) - shoulder arthroscopy, rotator cuff repair, biceps tenodesis, extensive debridement, subacromial decompression with partial acromioplasty    Location: [facility] 03 / [facility]    Surgeons: KAROLINE Stratton MD            Relevant Problems   Anesthesia (within normal limits)      Cardiac   (+) Abnormal electrocardiogram (ECG) (EKG)   (+) Hyperlipidemia   (+) Hypertension   (+) Peripheral vascular disease, unspecified (CMS-HCC)      Pulmonary   (+) COPD (chronic obstructive pulmonary disease) (Multi)      Neuro   (+) Bilateral carotid artery stenosis   (+) Carotid artery occlusion      GI   (+) Intermittent dysphagia      /Renal   (+) Prostate cancer (Multi)      Endocrine   (+) Hypothyroidism      Musculoskeletal   (+) Generalized osteoarthritis of multiple sites   (+) Osteoarthritis of lumbar spine       Clinical information reviewed:    Allergies  Meds  Problems  Med Hx             NPO Detail:  NPO/Void Status  Date of Last Liquid: 01/21/25  Time of Last Liquid: 0700  Date of Last Solid: 01/20/25  Time of Last Solid: 2200         Physical Exam    Airway  Mallampati: III     Cardiovascular - normal exam     Dental   (+) upper dentures, lower dentures     Pulmonary - normal exam     Abdominal      Other findings: Short TMD, full upper denture out, only 2 teeth on bottom          Anesthesia Plan    History of general anesthesia?: yes  History of complications of general anesthesia?: no    ASA 3     general and regional     The patient is not a current smoker.    Anesthetic plan and risks discussed with patient.  Use of blood products discussed with who consented to blood products.

## 2025-01-21 NOTE — ANESTHESIA PROCEDURE NOTES
Airway  Date/Time: 1/21/2025 11:11 AM  Urgency: elective    Airway not difficult    Staffing  Performed: SILVINO   Authorized by: MIRANDA Alexander-ALONDRA    Performed by: Osmel Alicia  Patient location during procedure: OR    Indications and Patient Condition  Indications for airway management: anesthesia  Sedation level: deep  Preoxygenated: yes  Patient position: sniffing  Mask difficulty assessment: 1 - vent by mask    Final Airway Details  Final airway type: endotracheal airway      Successful airway: ETT  Cuffed: yes   Successful intubation technique: video laryngoscopy  Facilitating devices/methods: intubating stylet  Endotracheal tube insertion site: oral  Blade: Reynaldo  Blade size: #4  ETT size (mm): 7.5  Cormack-Lehane Classification: grade IIa - partial view of glottis  Placement verified by: chest auscultation, capnometry and palpation of cuff   Placement verification comments: (Video view of tube going through clear cords)  Measured from: lips  ETT to lips (cm): 23  Number of attempts at approach: 1  Number of other approaches attempted: 0    Additional Comments  DL with mac 4 with grade 3 view no attempt to pass tube,  grade 2a view with Benz video laryngoscopy without difficulty

## 2025-01-21 NOTE — ANESTHESIA PROCEDURE NOTES
Peripheral Block    Patient location during procedure: pre-op  Start time: 1/21/2025 10:20 AM  End time: 1/21/2025 10:31 AM  Reason for block: procedure for pain, at surgeon's request and post-op pain management  Staffing  Performed: SILVINO and CRNA   Authorized by: GIULIANO Gonzalez    Performed by: GIULIANO Gonzalez  Preanesthetic Checklist  Completed: patient identified, IV checked, site marked, risks and benefits discussed, surgical consent, monitors and equipment checked, pre-op evaluation and timeout performed   Timeout performed at: 1/21/2025 10:10 AM  Peripheral Block  Patient position: laying flat  Prep: ChloraPrep  Patient monitoring: heart rate, cardiac monitor and continuous pulse ox  Block type: interscalene  Laterality: left  Injection technique: single-shot  Guidance: nerve stimulator  Local infiltration: ropivacaine  Infiltration strength: 0.5 %  Dose: 20 mL  Needle  Needle gauge: 20 G  Needle localization: nerve stimulator and ultrasound guidance  Assessment  Injection assessment: negative aspiration for heme, no paresthesia on injection, incremental injection and local visualized surrounding nerve on ultrasound  Paresthesia pain: none  Heart rate change: no  Slow fractionated injection: yes  Additional Notes  Anesthesia consult was placed by Dr. Stratton for post procedural analgesia.  The patient's chart was reviewed and they were deemed an appropriate candidate for the procedure. The patient was educated in detail on the risks, benefits, and alternatives to the block including but not limited to: temporary or permanent nerve damage, bleeding, infection, damage to surrounding tissues, possible block failure and the potential for local anesthesia toxicity syndrome.  The patient expressed understanding and all questions were answered prior to the initiation of the procedure.  Informed consent was also signed by the patient and laterality determined per institutional policy.  A formal  "\"time out \"consistent with the institutions rules and regulations was performed by the anesthesia provider and appropriate RN.     Procedure  The patient was placed in the sitting position. The LEFT side was marked and skin prep applied and allowed to dry. Proper monitors were applied with oxygen.  Sedation was provided by administering     Versed 1 mg IV  Fentanyl 50 mcg IV  Attempt x1 by Osmel DUBOIS. Attempt x1 by A Nola CANTU.  A high frequency linear ultrasound probe with probe cover and sterile coupling gel was applied over the anterior neck and C-5/6/7 roots tightly located. The fascial planes between the anterior and middle scalene muscles as well as the great vessels of the neck were also identified. The probe was then positioned for a short axis view structural view, allowing for exclusion of any nearby vessels,and doppler mode was engaged to assist in this identification.   A 20g 4 inch stimuplex needle  was then advanced maintaining an in-plane visualization throughout the procedure, under ultrasound guidance from lateral to medial to come to rest just deep to the neurovascular sheath, but avoiding contact of the brachial plexus. A motor response was visualized from the nerve stimulator, loss of response was seen at 0.48 mAmp.  Upon negative aspiration,  2ml 2% lidocaine 20 ml 0.5% Ropivacaine with 4 mg decadron preservative free was administered safely and cautiously between the muscle planes.  Aspiration every 3-5mls was done to avoid potential intravascular injection.  All injections were done without resistance and were free of blood/ CSF.  The patient tolerated the procedure well without report of intense pain, tinnitus, metallic taste, or circumoral numbness.  The block was then evaluated after a few minutes and appeared to be functioning appropriately.                "

## 2025-01-22 ASSESSMENT — PAIN SCALES - GENERAL: PAINLEVEL_OUTOF10: 4

## 2025-01-28 ENCOUNTER — OFFICE VISIT (OUTPATIENT)
Dept: SURGERY | Facility: HOSPITAL | Age: 75
End: 2025-01-28
Payer: MEDICARE

## 2025-01-28 VITALS — TEMPERATURE: 97.5 F | HEART RATE: 76 BPM | SYSTOLIC BLOOD PRESSURE: 143 MMHG | DIASTOLIC BLOOD PRESSURE: 66 MMHG

## 2025-01-28 DIAGNOSIS — K43.9 VENTRAL HERNIA WITHOUT OBSTRUCTION OR GANGRENE: Primary | ICD-10-CM

## 2025-01-28 PROCEDURE — 99213 OFFICE O/P EST LOW 20 MIN: CPT | Performed by: SURGERY

## 2025-01-28 PROCEDURE — 1036F TOBACCO NON-USER: CPT | Performed by: SURGERY

## 2025-01-28 PROCEDURE — 1159F MED LIST DOCD IN RCRD: CPT | Performed by: SURGERY

## 2025-01-28 PROCEDURE — 1157F ADVNC CARE PLAN IN RCRD: CPT | Performed by: SURGERY

## 2025-01-28 PROCEDURE — 3078F DIAST BP <80 MM HG: CPT | Performed by: SURGERY

## 2025-01-28 PROCEDURE — 3077F SYST BP >= 140 MM HG: CPT | Performed by: SURGERY

## 2025-01-28 PROCEDURE — 1126F AMNT PAIN NOTED NONE PRSNT: CPT | Performed by: SURGERY

## 2025-01-28 PROCEDURE — 1123F ACP DISCUSS/DSCN MKR DOCD: CPT | Performed by: SURGERY

## 2025-01-28 ASSESSMENT — PAIN SCALES - GENERAL: PAINLEVEL_OUTOF10: 0-NO PAIN

## 2025-01-28 NOTE — PROGRESS NOTES
Assessment/Plan   Sae is recovering quite well following recent robotic assisted repair of an incarcerated ventral hernia.  We reviewed timeline for when he can resume unrestricted activities.  Follow-up with me in 6 weeks for final check    Subjective   Sae following up after recent robotic assisted repair of ventral hernia.  He then had shoulder surgery a week later.  He is actually doing quite well.  Minimal pain at this time.  He is just using Tylenol.       Objective     Physical Exam  NAD  A&Ox3  Non icteric  CTA  RR  Abdomen soft min tender. Wounds clean, intact.  No clinical evidence of hernia recurrence  Extremities warm, well perfused         Relevant Results      No results found for this or any previous visit (from the past 24 hours).        I spent 25 minutes in the professional and overall care of this patient.      Ilia Brooks MD

## 2025-01-28 NOTE — LETTER
January 28, 2025     Miguel Angel Hendrix DO  [address]  [facility] [place] [facility] [place] [address]  [facility] [address]    Patient: Sae Dan   YOB: 1950   Date of Visit: 1/28/2025       Dear Dr. Miguel Angel Hendrix, :    Thank you for referring Sae Dan to me for evaluation. Below are my notes for this consultation.  If you have questions, please do not hesitate to call me. I look forward to following your patient along with you.       Sincerely,     Ilia Brooks MD      CC: No Recipients  ______________________________________________________________________________________    Assessment/Plan  Sae is recovering quite well following recent robotic assisted repair of an incarcerated ventral hernia.  We reviewed timeline for when he can resume unrestricted activities.  Follow-up with me in 6 weeks for final check    Subjective  Sae following up after recent robotic assisted repair of ventral hernia.  He then had shoulder surgery a week later.  He is actually doing quite well.  Minimal pain at this time.  He is just using Tylenol.       Objective    Physical Exam  NAD  A&Ox3  Non icteric  CTA  RR  Abdomen soft min tender. Wounds clean, intact.  No clinical evidence of hernia recurrence  Extremities warm, well perfused         Relevant Results      No results found for this or any previous visit (from the past 24 hours).        I spent 25 minutes in the professional and overall care of this patient.      Ilia Brooks MD

## 2025-02-03 ENCOUNTER — APPOINTMENT (OUTPATIENT)
Facility: CLINIC | Age: 75
End: 2025-02-03
Payer: MEDICARE

## 2025-02-03 VITALS — WEIGHT: 230 LBS | HEIGHT: 69 IN | BODY MASS INDEX: 34.07 KG/M2

## 2025-02-03 DIAGNOSIS — Z98.890 STATUS POST LEFT ROTATOR CUFF REPAIR: Primary | ICD-10-CM

## 2025-02-03 PROCEDURE — 1160F RVW MEDS BY RX/DR IN RCRD: CPT

## 2025-02-03 PROCEDURE — 99024 POSTOP FOLLOW-UP VISIT: CPT

## 2025-02-03 PROCEDURE — 1157F ADVNC CARE PLAN IN RCRD: CPT

## 2025-02-03 PROCEDURE — 1036F TOBACCO NON-USER: CPT

## 2025-02-03 PROCEDURE — 1159F MED LIST DOCD IN RCRD: CPT

## 2025-02-03 PROCEDURE — 1123F ACP DISCUSS/DSCN MKR DOCD: CPT

## 2025-02-03 ASSESSMENT — PAIN - FUNCTIONAL ASSESSMENT: PAIN_FUNCTIONAL_ASSESSMENT: NO/DENIES PAIN

## 2025-02-03 NOTE — PROGRESS NOTES
History of Present Illness  No chief complaint on file.      74 y.o. male is 2 weeks postop from a left shoulder scope with rotator cuff repair and biceps tenodesis. The patient has no complaints today and states they are doing well.  Their pain is well controlled on oral pain medications.  They have not had any chest pain, shortness of breath, fevers, chills, or calf tenderness.  The pain from their surgery is gradually improving and they have not had any drainage from the wounds, redness, or any other constitutional symptoms (fevers, chills, feeling 'under the weather', etc.). No concerns with the incision. Denies any radicular symptoms today.  He continues to wear his sling at all times.  Patient is taking Tylenol as needed for pain at this point.  They state pain is well controlled and continuing to improve. State they are continuing to progress well.    Review of Systems   GENERAL: Negative for malaise, significant weight loss, fever, chills  MUSCULOSKELETAL: see HPI  NEURO:  Negative    Exam  Left shoulder incisions are clean dry intact well-healed. No evidence of infection today. Stitches were removed in the office today.  Ecchymosis noted to the anterior aspect of the left arm.  Axillary nerve appears to be intact today.  Patient has mild discomfort with gentle range of motion on exam today.   strength is symmetric bilaterally.  Radial pulses 2+ bilaterally.  Sensation and neurovasc examination of the left upper extremities was intact today.    Assessment  Patient is 2-week status post left shoulder scope with rotator cuff repair, biceps tenodesis.    Plan  Overall, the patient is doing very well after their surgery and is pleased with their progress. Today we removed the stitches and applied Steri-Strips. It is okay for the patient to shower but avoid soaking the wounds (no pools or bathtubs) for 3-4 more weeks until the wounds are completely healed.  The patient can slowly increase their activity  levels, but must do so slowly to avoid aggravating the joint.  We gave the patient a prescription for physical therapy to work on range of motion.  We discussed with patient that he should continue to wear his sling at all times until he is 6 weeks postoperative.  He can continue to take Tylenol and or over-the-counter anti-inflammatories as needed for pain at this point.  We will plan on seeing the patient back in 8 more weeks. I told the patient to call with any questions or problems.

## 2025-02-03 NOTE — PATIENT INSTRUCTIONS
BMI was above normal measurement. Current weight:    Weight change since last visit (-) denotes wt loss     Weight loss needed to achieve BMI 25:   Lbs  Weight loss needed to achieve BMI 30:   Lbs  Advised to Increase physical activity.

## 2025-02-05 ENCOUNTER — EVALUATION (OUTPATIENT)
Dept: PHYSICAL THERAPY | Facility: CLINIC | Age: 75
End: 2025-02-05
Payer: MEDICARE

## 2025-02-05 DIAGNOSIS — M75.122 COMPLETE TEAR OF LEFT ROTATOR CUFF, UNSPECIFIED WHETHER TRAUMATIC: ICD-10-CM

## 2025-02-05 DIAGNOSIS — Z98.890 S/P LEFT ROTATOR CUFF REPAIR: Primary | ICD-10-CM

## 2025-02-05 PROCEDURE — 97110 THERAPEUTIC EXERCISES: CPT | Mod: GP

## 2025-02-05 PROCEDURE — 97161 PT EVAL LOW COMPLEX 20 MIN: CPT | Mod: GP

## 2025-02-05 ASSESSMENT — ENCOUNTER SYMPTOMS
DEPRESSION: 0
LOSS OF SENSATION IN FEET: 0
OCCASIONAL FEELINGS OF UNSTEADINESS: 0

## 2025-02-05 NOTE — PROGRESS NOTES
Physical Therapy Evaluation    Subjective:  Patient Name: Sae Dan  MRN: [id number]  Today's Date: 2/5/2025  Visit: 1/auth  Referred by: Viral     Diagnosis: s/p L RTC repair  Mechanism of Injury:  Patient is s/p L RTC repair and biceps tenodesis on 1/21/25.  Location: L anterolateral upper arm  Pain Rating: 3/10 achey, no pain meds  Increased pain: any pressure on arm  Decreased pain: sling   Denies numbness/tingling in UE  R handed    Current Medical Management: patient is post op.  Has begun hand and wrist ROM  Precautions:  wear sling x 6 weeks from surgery  Functional Limitations: significantly limited due to unable to actively use L UE.  Wife aids with dressing, does all IADLs  Prior Level of Function: indep with IADLs, enjoys fishing  Work Status: retired  Living Environment: no issues  Social Support: spouse  Personal Factors that may impact care: none    Objective:  Patient with L sling in place upon entering  Negative swelling in hands    Shoulder PROM (L):  Flexion=58  Abduction= NT  Extension= NT  IR= to stomach  ER= 13  Elbow Flexion= WNL  Elbow Extension= WNL  Limited forearm supination    Shoulder Strength (L): NT due to recent surgery   strength L= WNL  Wrist flexion and extension= 5/5    Special Tests:  Quick Dash= 44    Treatment Performed Today:  Instructed in and issued for HEP:  Pendulums, passive elbow extension, wand ER, counter top flexion  Discussed precautions with sling use and PROM only.    Assessment:  73 yo M s/p L RTC repair and biceps tenodesis on 1/21/25.  Presents with pain, weakness and limited ROM and function as expected for stage of recovery.  Would benefit from PT to address deficits and allow full return to function.     . Low complexity due to patient's clinical presentation being stable and uncomplicated by any significant comorbidities that may affect rehab tolerance and progression.    Clinical presentation:  Stable and/or uncomplicated  characteristics,  Problem List:  Limited shoulder ROM, strength, function, and pain  Level of Complexity:  low  Plan:  -Goals:   Active       PT Problem       Improve Quick Dash by at least 15 points and indep with ADLs/IADLs       Start:  02/05/25    Expected End:  05/06/25            Patient to report no more than 1-2/10 pain with activity       Start:  02/05/25    Expected End:  05/06/25            Check L UE strength and set goals when appropriate       Start:  02/05/25    Expected End:  05/06/25            Increase L UE flexion and abd to at least 140 to aid in functional reaching.       Start:  02/05/25    Expected End:  05/06/25            Increase L shoulder ER to at least 45 and IR to allow reaching behind back       Start:  02/05/25    Expected End:  05/06/25              -Frequency & Duration:   1-2x/week x 6 weeks  -Rehab Potential:   good  Plan of care was developed with input and agreement of the patient.    Billing:  PT Evaluation Time Entry  PT Evaluation (Low) Time Entry: 30  PT Therapeutic Procedures Time Entry  Therapeutic Exercise Time Entry: 15

## 2025-02-10 ENCOUNTER — TREATMENT (OUTPATIENT)
Dept: PHYSICAL THERAPY | Facility: CLINIC | Age: 75
End: 2025-02-10
Payer: MEDICARE

## 2025-02-10 DIAGNOSIS — M75.122 COMPLETE TEAR OF LEFT ROTATOR CUFF, UNSPECIFIED WHETHER TRAUMATIC: Primary | ICD-10-CM

## 2025-02-10 DIAGNOSIS — Z98.890 S/P LEFT ROTATOR CUFF REPAIR: ICD-10-CM

## 2025-02-10 PROCEDURE — 97110 THERAPEUTIC EXERCISES: CPT | Mod: GP

## 2025-02-10 NOTE — PROGRESS NOTES
Physical Therapy Treatment    Patient Name: Sae Dan  MRN: [id number]  Today's Date: 2/10/2025   Visit 2/12 (2/5/25-5/5/25)  Time Calculation  Start Time: 0830  Stop Time: 0905  Time Calculation (min): 35 min  Dx: s/p L RTC repair    PRECAUTIONS: sling x 6 weeks post op    SUBJECTIVE:  Doing pretty good overall.  Pain level: achey only, no pain meds  Compliant with HEP? yes    OBJECTIVE:  L shoulder PROM:  -flexion= 102  -ER= 36    TREATMENT:  - Therex:  Pendulums x 2 min  Counter top flexion x 10 reps L  Seated with L arm supported:  -3# wrist flexion and extension x20 reps  -active supination/pronation  -wand ER    Current HEP:  Pendulums, passive elbow extension, wand ER, counter top flexion    - Manual Therapy:  Passive stretching L shoulder flexion and ER    ASSESSMENT:   Patient very compliant with HEP and making nice gains in ROM  EDUCATION:  Continued HEP  PLAN:    Continue with PROM L shoulder    Billing:     PT Therapeutic Procedures Time Entry  Therapeutic Exercise Time Entry: 35

## 2025-02-12 ENCOUNTER — TREATMENT (OUTPATIENT)
Dept: PHYSICAL THERAPY | Facility: CLINIC | Age: 75
End: 2025-02-12
Payer: MEDICARE

## 2025-02-12 DIAGNOSIS — Z98.890 S/P LEFT ROTATOR CUFF REPAIR: ICD-10-CM

## 2025-02-12 DIAGNOSIS — M75.122 COMPLETE TEAR OF LEFT ROTATOR CUFF, UNSPECIFIED WHETHER TRAUMATIC: Primary | ICD-10-CM

## 2025-02-12 PROCEDURE — 97110 THERAPEUTIC EXERCISES: CPT | Mod: GP

## 2025-02-12 NOTE — PROGRESS NOTES
Physical Therapy Treatment    Patient Name: Sae Dan  MRN: [id number]  Today's Date: 2/12/2025   Visit 3/12 (2/5/25-5/5/25)  Time Calculation  Start Time: 0830  Stop Time: 0900  Time Calculation (min): 30 min  Dx: s/p L RTC repair, biceps tenodesis (1/21/25)    PRECAUTIONS: sling x 6 weeks post op    SUBJECTIVE:  Was pretty sore after last visit.    Pain level: achey only, no pain meds  Compliant with HEP? yes    OBJECTIVE:  L shoulder PROM:  -flexion= 115  -ER= 45    TREATMENT:  - Therex:  Pendulums x 2 min  Counter top flexion x 10 reps L  Seated with L arm supported:  -3# wrist flexion and extension x 20 reps  -active supination/pronation  -wand ER    Current HEP:  Pendulums, passive elbow extension, wand ER, counter top flexion    - Manual Therapy:  Passive stretching L shoulder flexion and ER    ASSESSMENT:   Patient very compliant with HEP and making nice gains in ROM  EDUCATION:  Continued HEP  PLAN:    Continue with PROM L shoulder    Billing:     PT Therapeutic Procedures Time Entry  Therapeutic Exercise Time Entry: 30

## 2025-02-17 ENCOUNTER — TREATMENT (OUTPATIENT)
Dept: PHYSICAL THERAPY | Facility: CLINIC | Age: 75
End: 2025-02-17
Payer: MEDICARE

## 2025-02-17 DIAGNOSIS — Z98.890 S/P LEFT ROTATOR CUFF REPAIR: ICD-10-CM

## 2025-02-17 DIAGNOSIS — M75.122 COMPLETE TEAR OF LEFT ROTATOR CUFF, UNSPECIFIED WHETHER TRAUMATIC: Primary | ICD-10-CM

## 2025-02-17 PROCEDURE — 97110 THERAPEUTIC EXERCISES: CPT | Mod: GP

## 2025-02-17 NOTE — PROGRESS NOTES
Physical Therapy Treatment    Patient Name: Sae Dan  MRN: [id number]  Today's Date: 2/17/2025   Visit 4/12 (2/5/25-5/5/25)  Time Calculation  Start Time: 1000  Stop Time: 1045  Time Calculation (min): 45 min  Dx: s/p L RTC repair, biceps tenodesis (1/21/25)    PRECAUTIONS: sling x 6 weeks post op    SUBJECTIVE:  Pain level: achey only, no pain meds  Compliant with HEP? yes    OBJECTIVE:  L shoulder PROM:  -flexion= 115 PROM, 123 wand flexion supine  -ER= 45    TREATMENT:  - Therex:  Pendulums x 2 min  Pulley scaption x 3 reps  Counter top flexion x 15 reps L  Seated with L arm supported:  -4# wrist flexion and extension x 20 reps  -active supination/pronation  -wand ER    Current HEP:  Pendulums, passive elbow extension, wand ER, counter top flexion    - Manual Therapy:  Passive stretching L shoulder flexion and ER    ASSESSMENT:  Patient demonstrated improved shoulder flexion ROM when using cane in supine vs manual passive stretching  EDUCATION:  Continued HEP  PLAN:    Continue with PROM L shoulder    Billing:     PT Therapeutic Procedures Time Entry  Therapeutic Exercise Time Entry: 45

## 2025-02-20 ENCOUNTER — TREATMENT (OUTPATIENT)
Dept: PHYSICAL THERAPY | Facility: CLINIC | Age: 75
End: 2025-02-20
Payer: MEDICARE

## 2025-02-20 DIAGNOSIS — Z98.890 S/P LEFT ROTATOR CUFF REPAIR: ICD-10-CM

## 2025-02-20 DIAGNOSIS — M75.122 COMPLETE TEAR OF LEFT ROTATOR CUFF, UNSPECIFIED WHETHER TRAUMATIC: ICD-10-CM

## 2025-02-20 PROCEDURE — 97110 THERAPEUTIC EXERCISES: CPT | Mod: GP

## 2025-02-20 NOTE — PROGRESS NOTES
Physical Therapy Treatment    Patient Name: Sae Dan  MRN: [id number]  Today's Date: 2/20/2025   Visit 5/12 (2/5/25-5/5/25)  Time Calculation  Start Time: 0900  Stop Time: 0940  Time Calculation (min): 40 min  Dx: s/p L RTC repair, biceps tenodesis (1/21/25)    PRECAUTIONS: sling x 6 weeks post op    SUBJECTIVE:  Pain level: achey only, no pain meds  Compliant with HEP? yes    OBJECTIVE:  L shoulder PROM:  -flexion= 117 PROM  -ER= 53    TREATMENT:  - Therex:  Pendulums x 2 min  Pulley scaption x 3 reps  Counter top flexion x 15 reps L  Seated with L arm supported:  -4# wrist flexion and extension x 20 reps  -active supination/pronation  -wand ER  Supine wand flexion    Current HEP:  Pendulums, passive elbow extension, wand ER, counter top flexion    - Manual Therapy:  Passive stretching L shoulder flexion and ER    ASSESSMENT:  Good tolerance to today's exercises.  Did well with wand flexion in supine  EDUCATION:  Continued HEP  PLAN:    Continue with PROM L shoulder    Billing:     PT Therapeutic Procedures Time Entry  Therapeutic Exercise Time Entry: 40

## 2025-02-24 ENCOUNTER — APPOINTMENT (OUTPATIENT)
Dept: NEPHROLOGY | Facility: CLINIC | Age: 75
End: 2025-02-24
Payer: MEDICARE

## 2025-02-24 ENCOUNTER — TREATMENT (OUTPATIENT)
Dept: PHYSICAL THERAPY | Facility: CLINIC | Age: 75
End: 2025-02-24
Payer: MEDICARE

## 2025-02-24 VITALS
OXYGEN SATURATION: 92 % | WEIGHT: 232.4 LBS | HEIGHT: 69 IN | BODY MASS INDEX: 34.42 KG/M2 | SYSTOLIC BLOOD PRESSURE: 157 MMHG | TEMPERATURE: 97.1 F | DIASTOLIC BLOOD PRESSURE: 70 MMHG

## 2025-02-24 DIAGNOSIS — M75.122 COMPLETE TEAR OF LEFT ROTATOR CUFF, UNSPECIFIED WHETHER TRAUMATIC: Primary | ICD-10-CM

## 2025-02-24 DIAGNOSIS — I10 ESSENTIAL HYPERTENSION: ICD-10-CM

## 2025-02-24 DIAGNOSIS — Z98.890 S/P LEFT ROTATOR CUFF REPAIR: ICD-10-CM

## 2025-02-24 DIAGNOSIS — N18.31 STAGE 3A CHRONIC KIDNEY DISEASE (MULTI): Primary | ICD-10-CM

## 2025-02-24 PROCEDURE — 1123F ACP DISCUSS/DSCN MKR DOCD: CPT | Performed by: INTERNAL MEDICINE

## 2025-02-24 PROCEDURE — G2211 COMPLEX E/M VISIT ADD ON: HCPCS | Performed by: INTERNAL MEDICINE

## 2025-02-24 PROCEDURE — 97110 THERAPEUTIC EXERCISES: CPT | Mod: GP

## 2025-02-24 PROCEDURE — 99214 OFFICE O/P EST MOD 30 MIN: CPT | Performed by: INTERNAL MEDICINE

## 2025-02-24 PROCEDURE — 1157F ADVNC CARE PLAN IN RCRD: CPT | Performed by: INTERNAL MEDICINE

## 2025-02-24 PROCEDURE — 3078F DIAST BP <80 MM HG: CPT | Performed by: INTERNAL MEDICINE

## 2025-02-24 PROCEDURE — 1159F MED LIST DOCD IN RCRD: CPT | Performed by: INTERNAL MEDICINE

## 2025-02-24 PROCEDURE — 3075F SYST BP GE 130 - 139MM HG: CPT | Performed by: INTERNAL MEDICINE

## 2025-02-24 RX ORDER — ASPIRIN 81 MG/1
81 TABLET ORAL DAILY
COMMUNITY

## 2025-02-24 NOTE — PROGRESS NOTES
Physical Therapy Treatment    Patient Name: Sae Dan  MRN: [id number]  Today's Date: 2/24/2025   Visit 6/12 (2/5/25-5/5/25)  Time Calculation  Start Time: 1130  Stop Time: 1215  Time Calculation (min): 45 min  Dx: s/p L RTC repair, biceps tenodesis (1/21/25)    PRECAUTIONS: sling x 6 weeks post op    SUBJECTIVE:  Pain level: achey only, no pain meds  Compliant with HEP? yes    OBJECTIVE:  L shoulder PROM:  -flexion= 134 PROM  -ER= 56    TREATMENT:  - Therex:  Pendulums x 2 min  Pulley scaption x 3 reps  Counter top flexion x 15 reps L  Active ER x 15 reps  Seated with L arm supported:  -4# wrist flexion and extension x 20 reps  -active supination/pronation  Supine wand flexion    Current HEP:  Pendulums, passive elbow extension, wand ER, counter top flexion    - Manual Therapy:  Passive stretching L shoulder flexion and ER  Passive L Scapular diagonals    ASSESSMENT:  Good tolerance to today's exercises.  Demonstrates nice improvement in PROM today  EDUCATION:  Continued HEP  PLAN:    Continue with PROM L shoulder, begin to advance to AAROM.  Introduce wall shoulder flexion next visit.    Billing:     PT Therapeutic Procedures Time Entry  Therapeutic Exercise Time Entry: 45

## 2025-02-24 NOTE — PROGRESS NOTES
"Subjective       Sae Dan is a 74 y.o. male who has past medical history of HTN, HLD, bilateral carotid stenosis, PAD, hypothyroidism, OA, prostate cancer, cerebral infarction, and COPD presents for chronic kidney disease follow-up    Last office visit August 2024.  Dilip came today with his wife \"Alie \".  He had rotator cuff surgery.  He cut down on NSAID use.  He continues to keep himself well-hydrated.  Consume 4-6 cups of coffee daily-instructed to no more than 3 cups.  Blood pressure today is accepted.  Volume status accepted.  He did not get blood work done prior to the visit as instructed.  Most recent blood work done 3 months ago showed improved serum creatinine 1.4 from prior 1.6 and GFR 50 from prior 40.  Will repeat blood work today.  No change in medications today.  Will see him annually    Prior notes    Sae comes in today with his wife, Alie. Referral by PCP, Dr. Hendrix, for acute worsening of kidney function in May 2024. In Dec 2021, SCr increased from 1.3 to 1.42. This continued to rise up until August 2023 with a peak at 1.85. This coincided with hospital admission for acute appendicitis. Repeat Scr 1.24 in September 2023. In April 2024, Scr increased again to 1.63.  Currently has brachytherapy in place. PSA stable per radiation oncology. No history of external radiation. BP today was slightly elevated compared to prior office visits. States that he was walking around the building outside, which led to this elevation. Reports that his SBP at home runs around 120s. States that he drinks >50 oz of water per day. Discussed limiting sodium rich foods. Reports that he originally gained weight due to long-term prednisone use for treatment of Grave's ophthalmopathy. No history of kidney stones.     Objective   /70 (BP Location: Right arm, Patient Position: Standing, BP Cuff Size: Adult)   Temp 36.2 °C (97.1 °F)   Ht 1.753 m (5' 9\")   Wt 105 kg (232 lb 6.4 oz)   SpO2 92%   BMI 34.32 " "kg/m²   Wt Readings from Last 3 Encounters:   02/24/25 105 kg (232 lb 6.4 oz)   02/03/25 104 kg (230 lb)   01/15/25 104 kg (230 lb 6.1 oz)       Physical Exam    General appearance: no distress awake and alert on room air, euvolemic on exam  Eyes: non-icteric  HEENT: atrumatic head, PEERLA, moist mucosa  Skin: no apparent rash  Heart: NSR, S1, S2 normal, no murmur or gallop  Lungs: Symmetrical expansion, wheezing bilaterally  Abdomen: soft, nt/nd, obese  Extremities: no edema bilat, left arm in a sling  Neuro: No FND,asterixis, no focal deficits noticed        Review of Systems     Constitutional: no fever, no chills, no recent weight gain and no recent weight loss.   Eyes: no blurred vision and no diplopia.   ENT: no hearing loss, no earache, no sore throat, no swollen glands in the neck and no nasal discharge.   Cardiovascular: no chest pain, no palpitations and no lower extremity edema.   Respiratory: no shortness of breath, no chronic cough and no shortness of breath during exertion.   Gastrointestinal: no abdominal pain, no constipation, no heartburn, no vomiting, no bloody stools and no change in bowel movements.   Genitourinary: no dysuria and no hematuria.   Musculoskeletal: no arthralgias and no myalgias.   Skin: no rashes and no skin lesions.   Neurological: no headaches and no dizziness.   Psychiatric: no confusion, no depression and no anxiety.   Endocrine: no heat intolerance, no cold intolerance, appetite not increased, no thyroid disorder, no increased urinary frequency and no dry skin.   Hematologic/Lymphatic: does not bleed easily and does not bruise easily.   All other systems have been reviewed and are negative for complaint.         Data Review                   No results found for: \"URICACID\"        No results found for: \"HGBA1C\"              No lab exists for component: \"CR\", \"PHOSPHORUS\"        Albumin/Creatinine Ratio   Date Value Ref Range Status   08/28/2024 9.4 <30.0 ug/mg Creat Final "            RFP  Recent Labs     12/24/24  0746 10/01/24  0822 04/25/24  0916 09/07/23  1111 08/05/23  0544 08/04/23  1418 12/21/22  0750 05/05/21  1102 03/17/21  0807    139 139 139 134* 135* 143   < > 141   K 4.5 4.9 4.7 4.5 5.2 5.2 4.6   < > 4.4   * 108* 109* 108* 105 107 108*   < > 108*   CO2 23 21 21 23 21 20* 24   < > 22*   BUN 35* 42* 33* 22 28* 24* 31*   < > 24   CREATININE 1.46* 1.75* 1.63* 1.24 1.85* 1.62* 1.65*   < > 1.3   GLUCOSE 94 99 84 94 144* 97 93   < > 100*   CALCIUM 8.9 9.3 8.8 8.9 8.3* 8.7 9.0   < > 9.2   PHOS  --  3.7  --   --   --   --   --   --   --    EGFR 50* 40* 44*  --   --   --   --   --  58   ANIONGAP 13 15 14 13 13 13 16   < > 11    < > = values in this interval not displayed.        Urineanalysis  Recent Labs     08/28/24 1626 08/28/24 1623 05/05/21  1102 03/17/21  0803   COLORU Light-Yellow Yellow YELLOW YELLOW   APPEARANCEU Clear Hazy* CLEAR CLEAR   SPECGRAVU 1.020 1.025 1.008 1.025   MARISA 5.5 5.5 6.0 5.5   PROTUR NEGATIVE NEGATIVE NEGATIVE NEGATIVE   GLUCOSEU Normal NEGATIVE NEGATIVE NEGATIVE   BLOODU 0.5 (2+)* MODERATE (2+)* NEGATIVE NEGATIVE   KETONESU NEGATIVE NEGATIVE NEGATIVE NEGATIVE   BILIRUBINU NEGATIVE NEGATIVE NEGATIVE NEGATIVE   NITRITEU NEGATIVE NEGATIVE NEGATIVE NEGATIVE   LEUKOCYTESU NEGATIVE  --  NEGATIVE NEGATIVE       Urine Electrolytes  Recent Labs     08/28/24 1626 08/28/24 1623 05/05/21  1102 03/17/21  0803   CREATU 146.5  145.6  --   --   --    PROTUR NEGATIVE NEGATIVE NEGATIVE NEGATIVE   UTPCR 0.07  --   --   --    ALBUMINUR 13.8  --   --   --    MICROALBCREA 9.4  --   --   --         Urine Micro  Recent Labs     08/28/24  1626 03/17/21  0803   WBCU 1-5 NONE SEEN   RBCU 11-20* NONE SEEN   HYALCASTU 4+*  --    MUCUSU FEW  --         Iron  Recent Labs     11/08/18  1110   IRON 107   TIBC 375   IRONSAT 29   FERRITIN 1,073*          Current Outpatient Medications on File Prior to Visit   Medication Sig Dispense Refill    acetaminophen (Tylenol) 500  mg tablet Take 2 tablets (1,000 mg) by mouth every 8 hours if needed for mild pain (1 - 3) (Take 2 tablets every 8 hours for 5 days, then after 5 days take 2 tablets as needed for pain). 90 tablet 0    aspirin 81 mg EC tablet Take 1 tablet (81 mg) by mouth once daily.      levothyroxine (Synthroid, Levoxyl) 112 mcg tablet Take 1 tablet (112 mcg) by mouth once daily. 90 tablet 1    olmesartan (BENIcar) 20 mg tablet Take 1 tablet (20 mg) by mouth once daily. 90 tablet 1    pravastatin (Pravachol) 80 mg tablet Take 1 tablet (80 mg) by mouth once daily. as directed 90 tablet 1    [] aspirin 325 mg EC tablet Take 1 tablet (325 mg) by mouth 2 times a day. 60 tablet 0    ferrous sulfate 325 (65 Fe) MG EC tablet Take 1 tablet by mouth once daily. Do not crush, chew, or split. (Patient not taking: Reported on 2025)      gabapentin (Neurontin) 300 mg capsule Take 1 capsule (300 mg) by mouth once daily at bedtime for 5 days. 5 capsule 0    [] meloxicam (Mobic) 15 mg tablet Take 1 tablet (15 mg) by mouth once daily as needed for moderate pain (4 - 6) (pain). 30 tablet 0    oxyCODONE (Roxicodone) 5 mg immediate release tablet Take 1 tablet (5 mg) by mouth every 6 hours if needed for severe pain (7 - 10) (Do not take within 3 hours of GABAPENTIN). (Patient not taking: Reported on 2025) 28 tablet 0    polyethylene glycol (Glycolax, Miralax) 17 gram packet Take 17 g by mouth once daily as needed (for constipation) for up to 10 doses. (Patient not taking: Reported on 2025) 10 packet 0     No current facility-administered medications on file prior to visit.           Assessment and Plan       Sae Dan  is a 74 y.o. male who has past medical history of HTN, HLD, bilateral carotid stenosis, PAD, hypothyroidism, OA, prostate cancer, cerebral infarction, and COPD presents for CKD follow-up    # Chronic kidney disease - G3bA1  - Baseline SCr 1.3-1.6, GFR 40-50  - Most recent Scr 1.4, GFR 50 in  December 2024  - Most likely related to atherosclerosis and chronic HTN, NSAID use  - Urine dipstick in office earlier showed blood with positive RBCs.  Possibly related to prostate issues.  - Prior CT August 2023-Kidneys/collecting system/urinary bladder: Right renal cyst. Left renal subcentimeter low-attenuation lesion, too small to characterize.  Kidney ultrasound done in August 2024 showed left renal cyst with increased echogenicity bilateral.  No masses  - Lytes: Within normal sodium, potassium, no significant acidosis  - Celecoxib 200mg bid for OA, switch to Tylenol prn for GFR preservation   -Continue conservative measures with RAAS inhibitors.  No indication for SGL inhibitors at this time    # BP - today at office is in target  - average home reading with systolic 120s  - Current meds: Olmesartan 20mg  - Low threshold to start diuretic in the future  - No Changes     #Anemia Hb-13  - No recent iron studies    #(CKD)-MBD  -With normal calcium,.  Will check vitamin D, phosphorus, PTH with follow-up    # [facility]-CKD  -Pravastatin 40mg, RAAS inhibitors  -    #No Acidosis   #No diabetes    #Others  - No NSAIDs, no contrast as possible. If to be done- we recommend holding ACEi/ARBS/diuretics 24 hrs prior to contrast exposure and ensure appropriate hydration   - Ensure well hydration  - Limit salt in diet  - No smoking    Patient received CKD education and counseling. Please follow up with labs and UA prior to the next office visit in 12 months.    Keely Wilson MD, MS, JEFFREY WADDELL   Clinical  - [employer] [employer] [facility] [employer]   Nephrologist - [facility] - [facility]

## 2025-02-24 NOTE — PATIENT INSTRUCTIONS
Dear Sae-it was nice meeting you nephrology clinic today discuss the following    # Chronic kidney disease stage III-baseline kidney function 40-50%.  Continue to hold Celebrex/NSAID medications.  You are due to repeat blood work today    # Hypertension-in good control at home.  Continue olmesartan 20 mg.  We discussed low-salt diet and healthy lifestyle and appropriate hydration    # Microscopic hematuria/blood in the urine-related to prostate issues.  Kidney ultrasound in August 2024 with no masses    Will repeat blood work and urine analysis today  Follow-up in 12 months with another repeat blood work analysis prior to next visit    Keely Wilson MD, MS, JEFFREY WADDELL   Clinical  - [facility] [employer]   Nephrologist - [facility] - [facility]

## 2025-02-27 ENCOUNTER — APPOINTMENT (OUTPATIENT)
Dept: NEPHROLOGY | Facility: CLINIC | Age: 75
End: 2025-02-27
Payer: MEDICARE

## 2025-02-27 ENCOUNTER — TREATMENT (OUTPATIENT)
Dept: PHYSICAL THERAPY | Facility: CLINIC | Age: 75
End: 2025-02-27
Payer: MEDICARE

## 2025-02-27 DIAGNOSIS — M75.122 COMPLETE TEAR OF LEFT ROTATOR CUFF, UNSPECIFIED WHETHER TRAUMATIC: Primary | ICD-10-CM

## 2025-02-27 DIAGNOSIS — Z98.890 S/P LEFT ROTATOR CUFF REPAIR: ICD-10-CM

## 2025-02-27 PROCEDURE — 97110 THERAPEUTIC EXERCISES: CPT | Mod: GP

## 2025-02-27 NOTE — PROGRESS NOTES
Physical Therapy Treatment    Patient Name: Sae Dan  MRN: [id number]  Today's Date: 2/27/2025   Visit 7/12 (2/5/25-5/5/25)  Time Calculation  Start Time: 0900  Stop Time: 0945  Time Calculation (min): 45 min  Dx: s/p L RTC repair, biceps tenodesis (1/21/25)    PRECAUTIONS: sling x 6 weeks post op    SUBJECTIVE:  Has been doing well except did have an instance of increased pain when holding/pushing against object at home.  Pain level: achey only, no pain meds  Compliant with HEP? yes    OBJECTIVE:  L shoulder PROM:  -flexion= 142 PROM  -ER= 58  -IR= 46    TREATMENT:  - Therex:  Pendulums x 2 min  Pulley scaption x 2  min  Counter top flexion x 15 reps L  Active ER x 15 reps  Active biceps curls  L shoulder wall flexion x 5 reps  Seated with L arm supported:  -4# wrist flexion and extension x 20 reps  -2# active supination/pronation  Supine wand flexion    Current HEP:  Pendulums, passive elbow extension, wand ER, counter top flexion    - Manual Therapy:  Passive stretching L shoulder flexion, IR, and ER  Passive L Scapular diagonals    ASSESSMENT:  Continued improvement in shoulder ROM  Challenged with shoulder wall flexion  EDUCATION:  Continued HEP  PLAN:    Will follow up when patient returns from out of town    Billing:     PT Therapeutic Procedures Time Entry  Therapeutic Exercise Time Entry: 45

## 2025-03-10 ENCOUNTER — TELEMEDICINE (OUTPATIENT)
Dept: PRIMARY CARE | Facility: CLINIC | Age: 75
End: 2025-03-10
Payer: MEDICARE

## 2025-03-10 DIAGNOSIS — R05.2 SUBACUTE COUGH: ICD-10-CM

## 2025-03-10 DIAGNOSIS — J06.9 ACUTE URI: Primary | ICD-10-CM

## 2025-03-10 PROCEDURE — 1123F ACP DISCUSS/DSCN MKR DOCD: CPT | Performed by: STUDENT IN AN ORGANIZED HEALTH CARE EDUCATION/TRAINING PROGRAM

## 2025-03-10 PROCEDURE — 99214 OFFICE O/P EST MOD 30 MIN: CPT | Performed by: STUDENT IN AN ORGANIZED HEALTH CARE EDUCATION/TRAINING PROGRAM

## 2025-03-10 PROCEDURE — 1157F ADVNC CARE PLAN IN RCRD: CPT | Performed by: STUDENT IN AN ORGANIZED HEALTH CARE EDUCATION/TRAINING PROGRAM

## 2025-03-10 RX ORDER — DOXYCYCLINE 100 MG/1
100 CAPSULE ORAL 2 TIMES DAILY
Qty: 20 CAPSULE | Refills: 0 | Status: SHIPPED | OUTPATIENT
Start: 2025-03-10

## 2025-03-10 RX ORDER — METHYLPREDNISOLONE 4 MG/1
TABLET ORAL
Qty: 21 TABLET | Refills: 0 | Status: SHIPPED | OUTPATIENT
Start: 2025-03-10

## 2025-03-10 ASSESSMENT — ENCOUNTER SYMPTOMS
SORE THROAT: 1
DYSURIA: 0
WHEEZING: 0
VOMITING: 0
HEADACHES: 0
NAUSEA: 0
FEVER: 1
DIARRHEA: 0
COUGH: 1
ABDOMINAL PAIN: 0

## 2025-03-10 NOTE — PROGRESS NOTES
Subjective   Patient ID: Sae Dan is a 74 y.o. male who presents for acute URI    Fever   The current episode started yesterday. The problem occurs rarely. The maximum temperature noted was 101 to 101.9 F. The temperature was taken using an oral thermometer. Associated symptoms include congestion, coughing and a sore throat. Pertinent negatives include no abdominal pain, chest pain, diarrhea, ear pain, headaches, muscle aches, nausea, rash, sleepiness, urinary pain, vomiting or wheezing.   Risk factors: hx of cancer and sick contacts    Risk factors: no contaminated food, no contaminated water, no immunosuppression, no occupational exposure, no recent sickness and no recent travel    His wife was seen earlier today and had very similar symptoms now for the past 3 weeks  He continues to notice signs/symptoms of upper respiratory complaints including sinus congestion, cough, and the overall feeling of being slightly ill  The patient himself stated that he has been having worsening symptoms from Wednesday of last week so today is day 5/6  Unfortunately he and his wife did not test himself for COVID-19  Due to him not feeling better he is calling the office today to be further evaluate/treated    He is calling into the office today via a telemedicine virtual visit    Review of Systems   Constitutional:  Positive for fever.   HENT:  Positive for congestion and sore throat. Negative for ear pain.    Respiratory:  Positive for cough. Negative for wheezing.    Cardiovascular:  Negative for chest pain.   Gastrointestinal:  Negative for abdominal pain, diarrhea, nausea and vomiting.   Genitourinary:  Negative for dysuria.   Skin:  Negative for rash.   Neurological:  Negative for headaches.       Objective   There were no vitals taken for this visit.    Physical Exam  CONSTITUTIONAL - well nourished, well developed, looks like stated age, in no acute distress, not ill-appearing, and not tired appearing  SKIN - normal  skin color and pigmentation, normal skin turgor without rash, lesions, or nodules visualized  HEAD - no trauma, normocephalic  EYES - normal external exam  CHEST -no distressed breathing, good effort, coughing occasionally during examination  EXTREMITIES - no edema, no deformities  NEUROLOGICAL - normal balance, normal motor, no ataxia  PSYCHIATRIC - alert, pleasant and cordial, age-appropriate    Assessment/Plan   We had a long conversation about your signs/symptoms and I truly believe this could be a start of a bacterial infection  I understand you did not test her cell for COVID-19/influenza but at this time I do not know if this is fully warranted since now you are over the 5-day window for getting treated for COVID and over the 2-day window for influenza    Due to your past medical history, it was discussed together that we will be slightly more aggressive and provide doxycycline as well as a Medrol Dosepak which is identical to what we provided your wife earlier this morning    If you are not getting better within the next 3-5 days, please contact the office and I will order chest x-ray at that time    At any point if you notice worsening or acute signs/symptoms please notify me immediately and/or go to nearest emergency room to be acutely evaluated

## 2025-03-11 ENCOUNTER — APPOINTMENT (OUTPATIENT)
Dept: PHYSICAL THERAPY | Facility: CLINIC | Age: 75
End: 2025-03-11
Payer: MEDICARE

## 2025-03-12 ENCOUNTER — APPOINTMENT (OUTPATIENT)
Dept: RADIOLOGY | Facility: HOSPITAL | Age: 75
End: 2025-03-12
Payer: MEDICARE

## 2025-03-12 ENCOUNTER — HOSPITAL ENCOUNTER (EMERGENCY)
Facility: HOSPITAL | Age: 75
Discharge: HOME | End: 2025-03-13
Attending: EMERGENCY MEDICINE
Payer: MEDICARE

## 2025-03-12 VITALS
HEIGHT: 69 IN | WEIGHT: 230 LBS | OXYGEN SATURATION: 96 % | BODY MASS INDEX: 34.07 KG/M2 | TEMPERATURE: 98.6 F | SYSTOLIC BLOOD PRESSURE: 146 MMHG | RESPIRATION RATE: 20 BRPM | HEART RATE: 81 BPM | DIASTOLIC BLOOD PRESSURE: 60 MMHG

## 2025-03-12 DIAGNOSIS — R10.12 LEFT UPPER QUADRANT ABDOMINAL PAIN: ICD-10-CM

## 2025-03-12 DIAGNOSIS — R05.1 ACUTE COUGH: Primary | ICD-10-CM

## 2025-03-12 PROCEDURE — 71046 X-RAY EXAM CHEST 2 VIEWS: CPT

## 2025-03-12 PROCEDURE — 71046 X-RAY EXAM CHEST 2 VIEWS: CPT | Performed by: SURGERY

## 2025-03-12 PROCEDURE — 99283 EMERGENCY DEPT VISIT LOW MDM: CPT | Mod: 25 | Performed by: EMERGENCY MEDICINE

## 2025-03-12 ASSESSMENT — LIFESTYLE VARIABLES
HAVE PEOPLE ANNOYED YOU BY CRITICIZING YOUR DRINKING: NO
EVER FELT BAD OR GUILTY ABOUT YOUR DRINKING: NO
TOTAL SCORE: 0
EVER HAD A DRINK FIRST THING IN THE MORNING TO STEADY YOUR NERVES TO GET RID OF A HANGOVER: NO
HAVE YOU EVER FELT YOU SHOULD CUT DOWN ON YOUR DRINKING: NO

## 2025-03-12 ASSESSMENT — PAIN SCALES - GENERAL: PAINLEVEL_OUTOF10: 10 - WORST POSSIBLE PAIN

## 2025-03-12 ASSESSMENT — PAIN - FUNCTIONAL ASSESSMENT: PAIN_FUNCTIONAL_ASSESSMENT: 0-10

## 2025-03-13 NOTE — ED PROVIDER NOTES
Sae Dan is a 74 y.o. patient presenting to the ED for left upper quadrant abdominal pain.  The patient states he had hernia repair surgery approximately 8 weeks ago.  He is also recently had rotator cuff surgery.  He is recovering from an upper respiratory infection.  He feels that he is overall doing better however he does have a persistent cough weakness.  Tonight, approximately 2 hours prior to arrival he was coughing when he felt a sharp, tearing pain in his left upper quadrant.  It is not associated with any other symptoms.  It is worse with coughing or some movements, but not changed by deep breathing.    Additional History Obtained from: wife  Limitations to History: none  ------------------------------------------------------------------------------------------------------------------------------------------  Physical Exam:  Appearance: Alert, cooperative,   Skin: Warm, dry, appropriate color for ethnicity.  Eyes: Cornea clear. No scleral icterus or injection.   ENT: Mucous membranes moist.  Pulmonary: No accessory muscle use or stridor. Clear lung sounds bilaterally without rhonchi or wheezing.   Cardiac: Heart sounds regular without murmur. B/L radial pulses full and symmetric.   Abdomen: Soft, not tender. No palpable defect in the abdominal wall. No bruising. No rebound or guarding.   Musculoskeletal: No gross deformities.   Neurological: Face symmetrical. Voice clear. Appropriately conversant.   Psychiatric: Appropriate mood and affect.    Medical Decision Making:  Chronic Medical Conditions Significantly Affecting Care:  has a past medical history of Abnormal liver ultrasound, Age-related nuclear cataract, left eye (06/12/2014), Age-related nuclear cataract, right eye (06/12/2014), Atherosclerotic heart disease of native coronary artery without angina pectoris (10/14/2013), Carotid stenosis, Cerebral infarction (2011), CKD (chronic kidney disease) stage 3, GFR 30-59 ml/min (Multi), COPD  (chronic obstructive pulmonary disease) (Multi), Depression, Essential (primary) hypertension (08/15/2016), Graves disease, History of stress test (03/09/2023), Hyperlipidemia, Hypocalcemia (08/15/2016), Hypothyroidism, OA (osteoarthritis), Other mechanical strabismus (05/15/2014), Personal history of other diseases of the circulatory system (10/04/2013), Personal history of other endocrine, nutritional and metabolic disease, Prostate cancer (Multi), Pulmonary nodule, PVD (peripheral vascular disease) (CMS-HCC), and Shortness of breath.    Social Determinants of Health Significantly Affecting Care: none identified    Differential Diagnosis Considered but not limited to: Given the patient's abdominal pain and persistent cough will obtain chest x-ray to rule out pneumothorax or      External Records Reviewed:   I reviewed recent and relevant outside records including:     Independent Interpretation of Studies: The following studies were ordered as part of the emergency department work up and independently interpreted by me. See ED Course for details.           Escalation of Care:        Discussion of Management with Other Providers:   I discussed the patient/results with:

## 2025-03-17 ENCOUNTER — TREATMENT (OUTPATIENT)
Dept: PHYSICAL THERAPY | Facility: CLINIC | Age: 75
End: 2025-03-17
Payer: MEDICARE

## 2025-03-17 DIAGNOSIS — M75.122 COMPLETE TEAR OF LEFT ROTATOR CUFF, UNSPECIFIED WHETHER TRAUMATIC: Primary | ICD-10-CM

## 2025-03-17 DIAGNOSIS — Z98.890 S/P LEFT ROTATOR CUFF REPAIR: ICD-10-CM

## 2025-03-17 PROCEDURE — 97110 THERAPEUTIC EXERCISES: CPT | Mod: GP

## 2025-03-17 NOTE — PROGRESS NOTES
"Physical Therapy Treatment    Patient Name: Sae Dan  MRN: [id number]  Today's Date: 3/17/2025   Visit 8/12 (2/5/25-5/5/25)  Time Calculation  Start Time: 0745  Stop Time: 0830  Time Calculation (min): 45 min  Dx: s/p L RTC repair, biceps tenodesis (1/21/25)    PRECAUTIONS: sling x 6 weeks post op    SUBJECTIVE:  Has been doing well.  Returned from cruise, but was compliant with HEP still.  Notices difficulty with horiz adduction ROM.  Pain level: 1-2/10 \"soreness\" only with reaching  Compliant with HEP? Yes, 2x/day    OBJECTIVE:  L shoulder AROM:  -flexion= 150  -ER= 51  -IR= 4 inch less than R behind back    TREATMENT:  - Therex:  Pulley scaption x 2  min  L shoulder wall flexion x 15 reps- instructed in doorway to increase ROM  Active scaption and ER 2x15 reps  Supine with L UE vertical:  -CW/CCW circles x 20 reps each  -serratus punches x 20 reps  Sidelying L shoulder abduction and ER x 15 reps each  Siedlying scapular diagonals - passive and active  Supine wand flexion    Current HEP:  Pendulums, passive elbow extension, wand ER, counter top flexion  Added cross body stretch    ASSESSMENT:  Patient with much improved AROM and functional use of L UE compared to last visit.  EDUCATION:  Continued HEP  PLAN:    Will continue to progress AROM and gradual strengthening.    Billing:     PT Therapeutic Procedures Time Entry  Therapeutic Exercise Time Entry: 45                   "

## 2025-03-18 ENCOUNTER — APPOINTMENT (OUTPATIENT)
Dept: SURGERY | Facility: HOSPITAL | Age: 75
End: 2025-03-18
Payer: MEDICARE

## 2025-03-19 LAB
25(OH)D3+25(OH)D2 SERPL-MCNC: 15 NG/ML (ref 30–100)
ALBUMIN SERPL-MCNC: 4.1 G/DL (ref 3.6–5.1)
ALBUMIN/CREAT UR: 17 MG/G CREAT
BUN SERPL-MCNC: 21 MG/DL (ref 7–25)
BUN/CREAT SERPL: ABNORMAL (CALC) (ref 6–22)
CALCIUM SERPL-MCNC: 9 MG/DL (ref 8.6–10.3)
CHLORIDE SERPL-SCNC: 106 MMOL/L (ref 98–110)
CO2 SERPL-SCNC: 18 MMOL/L (ref 20–32)
CREAT SERPL-MCNC: 1.17 MG/DL (ref 0.7–1.28)
CREAT UR-MCNC: 78 MG/DL (ref 20–320)
EGFRCR SERPLBLD CKD-EPI 2021: 65 ML/MIN/1.73M2
ERYTHROCYTE [DISTWIDTH] IN BLOOD BY AUTOMATED COUNT: 13.5 % (ref 11–15)
FERRITIN SERPL-MCNC: 221 NG/ML (ref 24–380)
GLUCOSE SERPL-MCNC: 94 MG/DL (ref 65–99)
HCT VFR BLD AUTO: 39.8 % (ref 38.5–50)
HGB BLD-MCNC: 12.7 G/DL (ref 13.2–17.1)
IRON SATN MFR SERPL: 13 % (CALC) (ref 20–48)
IRON SERPL-MCNC: 39 MCG/DL (ref 50–180)
MCH RBC QN AUTO: 28.9 PG (ref 27–33)
MCHC RBC AUTO-ENTMCNC: 31.9 G/DL (ref 32–36)
MCV RBC AUTO: 90.5 FL (ref 80–100)
MICROALBUMIN UR-MCNC: 1.3 MG/DL
PHOSPHATE SERPL-MCNC: 3.1 MG/DL (ref 2.1–4.3)
PLATELET # BLD AUTO: 231 THOUSAND/UL (ref 140–400)
PMV BLD REES-ECKER: 9.6 FL (ref 7.5–12.5)
POTASSIUM SERPL-SCNC: 4.2 MMOL/L (ref 3.5–5.3)
PTH-INTACT SERPL-MCNC: 41 PG/ML (ref 16–77)
RBC # BLD AUTO: 4.4 MILLION/UL (ref 4.2–5.8)
SODIUM SERPL-SCNC: 137 MMOL/L (ref 135–146)
TIBC SERPL-MCNC: 303 MCG/DL (CALC) (ref 250–425)
WBC # BLD AUTO: 9.1 THOUSAND/UL (ref 3.8–10.8)

## 2025-03-20 DIAGNOSIS — E55.9 VITAMIN D DEFICIENCY: Primary | ICD-10-CM

## 2025-03-20 RX ORDER — ERGOCALCIFEROL 1.25 MG/1
50000 CAPSULE ORAL
Qty: 4 CAPSULE | Refills: 11 | Status: SHIPPED | OUTPATIENT
Start: 2025-03-23 | End: 2026-03-23

## 2025-03-20 NOTE — PROGRESS NOTES
Abdulkadir Quevedo  Kidney function improved up to 65%-great job.  No protein leak in the urine.  No significant anemia.  Vitamin D is low-I called in weekly vitamin D supplement to repeat.  No further recommendation.  Follow-up as planned  Dr. Katie FROST

## 2025-03-21 ENCOUNTER — APPOINTMENT (OUTPATIENT)
Dept: PHYSICAL THERAPY | Facility: CLINIC | Age: 75
End: 2025-03-21
Payer: MEDICARE

## 2025-03-31 ENCOUNTER — APPOINTMENT (OUTPATIENT)
Facility: CLINIC | Age: 75
End: 2025-03-31
Payer: MEDICARE

## 2025-03-31 VITALS — HEIGHT: 69 IN | BODY MASS INDEX: 33.33 KG/M2 | WEIGHT: 225 LBS

## 2025-03-31 DIAGNOSIS — Z98.890 STATUS POST LEFT ROTATOR CUFF REPAIR: Primary | ICD-10-CM

## 2025-03-31 PROCEDURE — 1157F ADVNC CARE PLAN IN RCRD: CPT

## 2025-03-31 PROCEDURE — 99024 POSTOP FOLLOW-UP VISIT: CPT

## 2025-03-31 PROCEDURE — 1159F MED LIST DOCD IN RCRD: CPT

## 2025-03-31 PROCEDURE — 1160F RVW MEDS BY RX/DR IN RCRD: CPT

## 2025-03-31 PROCEDURE — 1036F TOBACCO NON-USER: CPT

## 2025-03-31 PROCEDURE — 1123F ACP DISCUSS/DSCN MKR DOCD: CPT

## 2025-03-31 ASSESSMENT — PAIN - FUNCTIONAL ASSESSMENT: PAIN_FUNCTIONAL_ASSESSMENT: NO/DENIES PAIN

## 2025-03-31 NOTE — PATIENT INSTRUCTIONS
BMI was above normal measurement. Current weight: 102 kg (225 lb)  Weight change since last visit (-) denotes wt loss -5 lbs   Weight loss needed to achieve BMI 25: 56.1 Lbs  Weight loss needed to achieve BMI 30: 22.3 Lbs  Advised to Increase physical activity.

## 2025-03-31 NOTE — PROGRESS NOTES
History of Present Illness  S/p left rotator cuff repair        74 y.o. male is 10 weeks postop from a left shoulder scope with rotator cuff repair and biceps tenodesis. The patient has no complaints today and states they are doing well.  Patient reports that he has minimal pain.  He states is more of a soreness especially after physical therapy.  He is not currently taking anything for pain at this time.  He states that he continues to see progress with his range of motion and physical therapy.  He notes that his internal rotation is coming along but appears to be slower than his other range of motion.  He continues to do stretching at home daily.  He states that he has not been in physical therapy the last 2 weeks just due to scheduling issues but is scheduled to return this week.  He denies any concerns with his incision.  No radicular symptoms that he endorses today.  Overall feels like he is progressing quite well.      Review of Systems   GENERAL: Negative for malaise, significant weight loss, fever, chills  MUSCULOSKELETAL: see HPI  NEURO:  Negative     Exam  Left shoulder demonstrates well-healed portal site incisions.  There is no erythema or signs of infection today.  There is no tenderness palpation of the sternoclavicular joint, AC joint or bicipital groove today.  Range of motion of the left shoulder.  125 degrees forward flexion abduction, externally rotates to 60 degrees and internally rotates to sacrum today.   strength is symmetric bilaterally.  Radial pulses 2+ bilaterally.  Sensation and neurovasc examination of the left upper extremities was intact today.     Assessment  Patient is 10 weeks status post left shoulder scope with rotator cuff repair, biceps tenodesis.     Plan  Discussed management with patient.  At this time discussed with patient that he can slowly continue to increase activities as tolerable.  Discussed with him that he should continue to work on his range of motion.  He was  given some exercises that he can do at home in addition to physical therapy.  Patient can continue to take over-the-counter Tylenol or anti-inflammatories as needed for any discomfort.  We discussed with him that he should hold off on any strengthening exercises until he is 12 weeks postoperative.  Will plan on following up with the patient in 8 weeks for recheck of the left shoulder.  If he continues to satisfactory at that time we will plan on releasing him regarding this issue.  If he has any questions or concerns he can call the office.

## 2025-04-03 ENCOUNTER — TREATMENT (OUTPATIENT)
Dept: PHYSICAL THERAPY | Facility: CLINIC | Age: 75
End: 2025-04-03
Payer: MEDICARE

## 2025-04-03 DIAGNOSIS — Z98.890 S/P LEFT ROTATOR CUFF REPAIR: ICD-10-CM

## 2025-04-03 DIAGNOSIS — M75.122 COMPLETE TEAR OF LEFT ROTATOR CUFF, UNSPECIFIED WHETHER TRAUMATIC: ICD-10-CM

## 2025-04-03 PROCEDURE — 97110 THERAPEUTIC EXERCISES: CPT | Mod: GP

## 2025-04-03 NOTE — PROGRESS NOTES
"Physical Therapy Treatment    Patient Name: Sae Dan  MRN: [id number]  Today's Date: 4/3/2025   Visit 9/12 (2/5/25-5/5/25)  Time Calculation  Start Time: 0900  Stop Time: 0945  Time Calculation (min): 45 min  Dx: s/p L RTC repair, biceps tenodesis (1/21/25)    PRECAUTIONS: sling x 6 weeks post op    SUBJECTIVE:  Went to follow up with dr kyle and happy with progress.  Pain level: \"soreness\" only with reaching  Compliant with HEP? Yes, 2x/day    OBJECTIVE:  L shoulder AROM:  -flexion= 151  -ER= 56  -IR= 3 inch less than R behind back  L shoulder strength:  -flexion and abduction = 4-/5  -IR and ER= 5/5    TREATMENT:  - Therex:  Scifit (UE only) x 6 min- fwd/bkwd  Pulley scaption x 2  min  3# Active scaption and ER 2x10 reps with cues to avoid shoulder hiking  3# Sidelying L shoulder abduction and ER 2x10 reps each  Reviewed and updated HEP:  Instructed in and issued for HEP: wand IR and towel IR stretch, scaption and s/l shoulder abd and ER, wall slides  ASSESSMENT:  Patient with consistent progress in ROM, strength and function.  Main goal now is to address strength, primarily flexion/abd/scaption  EDUCATION:  Continued HEP  PLAN:    Will follow up 4/24 and determine need for further PT.    Billing:     PT Therapeutic Procedures Time Entry  Therapeutic Exercise Time Entry: 45                   "

## 2025-04-07 ENCOUNTER — APPOINTMENT (OUTPATIENT)
Dept: PHYSICAL THERAPY | Facility: CLINIC | Age: 75
End: 2025-04-07
Payer: MEDICARE

## 2025-04-10 ENCOUNTER — TREATMENT (OUTPATIENT)
Dept: PHYSICAL THERAPY | Facility: CLINIC | Age: 75
End: 2025-04-10
Payer: MEDICARE

## 2025-04-10 DIAGNOSIS — M75.122 COMPLETE TEAR OF LEFT ROTATOR CUFF, UNSPECIFIED WHETHER TRAUMATIC: Primary | ICD-10-CM

## 2025-04-10 DIAGNOSIS — Z98.890 S/P LEFT ROTATOR CUFF REPAIR: ICD-10-CM

## 2025-04-10 PROCEDURE — 97110 THERAPEUTIC EXERCISES: CPT | Mod: GP

## 2025-04-10 NOTE — PROGRESS NOTES
"Physical Therapy Treatment    Patient Name: Sae Dan  MRN: [id number]  Today's Date: 4/10/2025   Visit 10/12 (2/5/25-5/5/25)  Time Calculation  Start Time: 0945  Stop Time: 1025  Time Calculation (min): 40 min  Dx: s/p L RTC repair, biceps tenodesis (1/21/25)    PRECAUTIONS: sling x 6 weeks post op    SUBJECTIVE:  Patient feels has improved quite a bit over the last week.  Increased ease and ROM on pulleys and scaption exercises at home.  Pain level: \"soreness\" only with reaching  Compliant with HEP? Yes, 2x/day    OBJECTIVE:  L shoulder PROM:  -flexion= 155  -ER= 66    TREATMENT:  - Therex:  Scifit (UE only) x 4 min- fwd/bkwd  Pulley scaption x 2  min  GTB rows 2x15 reps  3# Active scaption and ER 2x10 reps with cues to avoid shoulder hiking  IR stretch with band x 10 reps  Sidestepping with (progressing from green to gray tband x 10 reps IR and ER  2# Sidelying L shoulder abduction and ER 2x10 reps each  Supine with UE vertical:  -2# CW/CCW circles x 20 reps each  -2# serratus punches x 20 reps  Passive stretching L shoulder    Current HEP: wand IR and towel IR stretch, scaption and s/l shoulder abd and ER, wall slides  ASSESSMENT:  Patient has made nice goals in pain and function over the last week.  Main goal now is to address strength, primarily flexion/abd/scaption  EDUCATION:  Continued HEP  PLAN:    Will follow up 4/24 and determine need for further PT.    Billing:     PT Therapeutic Procedures Time Entry  Therapeutic Exercise Time Entry: 40                   "

## 2025-04-21 ENCOUNTER — APPOINTMENT (OUTPATIENT)
Dept: OPHTHALMOLOGY | Facility: CLINIC | Age: 75
End: 2025-04-21
Payer: MEDICARE

## 2025-04-21 DIAGNOSIS — H50.21 HYPOTROPIA OF RIGHT EYE: ICD-10-CM

## 2025-04-21 DIAGNOSIS — E07.9 THYROID EYE DISEASE: ICD-10-CM

## 2025-04-21 DIAGNOSIS — H57.89 THYROID EYE DISEASE: ICD-10-CM

## 2025-04-21 DIAGNOSIS — H53.2 DOUBLE VISION: Primary | ICD-10-CM

## 2025-04-21 PROCEDURE — 92060 SENSORIMOTOR EXAMINATION: CPT | Performed by: PSYCHIATRY & NEUROLOGY

## 2025-04-21 PROCEDURE — 99213 OFFICE O/P EST LOW 20 MIN: CPT | Performed by: PSYCHIATRY & NEUROLOGY

## 2025-04-21 ASSESSMENT — CONF VISUAL FIELD
METHOD: COUNTING FINGERS
OD_SUPERIOR_NASAL_RESTRICTION: 0
OS_INFERIOR_TEMPORAL_RESTRICTION: 0
OD_INFERIOR_NASAL_RESTRICTION: 0
OS_SUPERIOR_NASAL_RESTRICTION: 0
OS_NORMAL: 1
OD_NORMAL: 1
OD_SUPERIOR_TEMPORAL_RESTRICTION: 0
OS_INFERIOR_NASAL_RESTRICTION: 0
OS_SUPERIOR_TEMPORAL_RESTRICTION: 0
OD_INFERIOR_TEMPORAL_RESTRICTION: 0

## 2025-04-21 ASSESSMENT — ENCOUNTER SYMPTOMS
ALLERGIC/IMMUNOLOGIC NEGATIVE: 0
NEUROLOGICAL NEGATIVE: 0
CONSTITUTIONAL NEGATIVE: 0
RESPIRATORY NEGATIVE: 0
EYES NEGATIVE: 1
PSYCHIATRIC NEGATIVE: 0
CARDIOVASCULAR NEGATIVE: 0
HEMATOLOGIC/LYMPHATIC NEGATIVE: 0
GASTROINTESTINAL NEGATIVE: 0
ENDOCRINE NEGATIVE: 0
MUSCULOSKELETAL NEGATIVE: 0

## 2025-04-21 ASSESSMENT — VISUAL ACUITY
METHOD: SNELLEN - LINEAR
OD_PH_SC: 20/20-2
OD_SC: 20/30-1
OS_SC: 20/20

## 2025-04-21 ASSESSMENT — CUP TO DISC RATIO
OS_RATIO: 0.35
OD_RATIO: 0.35

## 2025-04-21 ASSESSMENT — TONOMETRY
OS_IOP_MMHG: 12
OD_IOP_MMHG: 11
IOP_METHOD: GOLDMANN APPLANATION

## 2025-04-21 NOTE — PROGRESS NOTES
Assessment and Plan    01/03/2025 Olivia OD 13 & OS 15 at base 97.     08/31/2018 CT facial bones without contrast, which I personally reviewed previously, shows right cheek soft tissue swelling.  04/09/2014 CT orbits without contrast, which I personally reviewed previously, shows no orbital lesion  04/04/2014 CT head without contrast, which I personally reviewed previously, shows stable findings of prior stroke.  01/02/2014 MRI brain without contrast, which I personally reviewed previously shows continued FLAIR evidence of right posterior frontal stroke, appearing chronic.  11/17/2012 MRI brain without contrast & MRA head & neck, which I personally reviewed previously, show diffusion changes with right posterior frontal and other scattered right hemispheric strokes along with bihemispheric white matter changes with right internal carotid artery occlusion.  11/17/2012 CT head without contrast, which I personally reviewed previously, shows no acute lesion.    Myasthenia gravis studies  Lab Results   Component Value Date/Time    LABACHR 0.0 01/07/2025 0840    LABACHR 0 01/07/2025 0840    ACETMOD 0 01/07/2025 0840     Thyroid eye disease studies  Lab Results   Component Value Date/Time    RECE <1.10 01/07/2025 0840    TSI <89 01/07/2025 0840    THYROIDPAB <28 01/07/2025 0840     09/14/2014 HVF 30-2 OD fovea 39, wnl MD +1.21 & OS fovea 37, wnl MD +1.87.  Prior HVF.    This 74 year-old man with a history of Graves disease status post thyroidectomy with hypothyroidism, carotid stenosis, HLD, HTN, CKD, COPD, PVD presents for evaluation of Graves disease and intermittent diplopia.    The small right hypotropia in superior gaze seems stable and consistent with residual thyroid eye disease. Antibodies related to thyroid eye disease were low, making progression also low risk. 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 We discussed whether to pursue imaging with its being reasonable to await further  symptoms.    Plan    If diplopia recurs, keep a log of double vision events covering each eye individually to determine whether the double is in each individual eye or only when looking through both eyes together..    Follow up as needed for diplopia with stereo plates. (Dilated 1/3/2025)   Yes

## 2025-04-23 ENCOUNTER — TREATMENT (OUTPATIENT)
Dept: PHYSICAL THERAPY | Facility: CLINIC | Age: 75
End: 2025-04-23
Payer: MEDICARE

## 2025-04-23 DIAGNOSIS — M75.122 COMPLETE TEAR OF LEFT ROTATOR CUFF, UNSPECIFIED WHETHER TRAUMATIC: Primary | ICD-10-CM

## 2025-04-23 DIAGNOSIS — Z98.890 S/P LEFT ROTATOR CUFF REPAIR: ICD-10-CM

## 2025-04-23 PROCEDURE — 97110 THERAPEUTIC EXERCISES: CPT | Mod: GP

## 2025-04-23 NOTE — PROGRESS NOTES
"Physical Therapy Treatment    Patient Name: Sae Dan  MRN: [id number]  Today's Date: 4/23/2025   Visit 11/12 (2/5/25-5/5/25)  Time Calculation  Start Time: 1000  Stop Time: 1040  Time Calculation (min): 40 min  Dx: s/p L RTC repair, biceps tenodesis (1/21/25)    PRECAUTIONS: sling x 6 weeks post op    SUBJECTIVE:  Patient reports has been having tingling/numbness over L UT x 2 weeks  No known causative factors.  States can relieve it quickly if moves head or L shoulder.  Otherwise, shoulder feeling good.  Able to do yard work, work around house without issues.  Feels has returned to all functional activity.  Pain level: \"tightness\" only  Compliant with HEP? Yes, 2x/day    OBJECTIVE:  Shoulder Strength (L):  Flexion=4  Abduction=4  IR= 5  ER= 5  Elbow Flexion= 5  Elbow Extension= 5    L shoulder AROM/PROM:  -flexion= 151/163  -abduction= 145/173  -IR= 4 inch less on L vs R reaching behind back/57  -ER= 53/76    Quick Dash= 2.5% impaired    TREATMENT:  - Therex:  Scifit (UE only) x 4 min- fwd/bkwd  Pulley scaption x 2  min  Rechecked for DC note.  See objective for details.  Reviewed HEP and finalized    Current HEP: R UT stretch, wand IR and towel IR stretch, scaption and s/l shoulder abd and ER, wall slides  ASSESSMENT:  Patient has progressed very well and has met goals.  Feels confident continuing indep with HEP.  EDUCATION:  Finalized HEP  PLAN:    DC to indep HEP    Billing:     PT Therapeutic Procedures Time Entry  Therapeutic Exercise Time Entry: 40                   "

## 2025-04-24 ENCOUNTER — APPOINTMENT (OUTPATIENT)
Dept: PHYSICAL THERAPY | Facility: CLINIC | Age: 75
End: 2025-04-24
Payer: MEDICARE

## 2025-05-05 ENCOUNTER — APPOINTMENT (OUTPATIENT)
Dept: PRIMARY CARE | Facility: CLINIC | Age: 75
End: 2025-05-05
Payer: MEDICARE

## 2025-05-05 VITALS
HEART RATE: 68 BPM | HEIGHT: 69 IN | DIASTOLIC BLOOD PRESSURE: 76 MMHG | BODY MASS INDEX: 34.36 KG/M2 | SYSTOLIC BLOOD PRESSURE: 130 MMHG | WEIGHT: 232 LBS

## 2025-05-05 DIAGNOSIS — Z13.6 ENCOUNTER FOR ABDOMINAL AORTIC ANEURYSM (AAA) SCREENING: ICD-10-CM

## 2025-05-05 DIAGNOSIS — Z00.00 PHYSICAL EXAM, ANNUAL: ICD-10-CM

## 2025-05-05 DIAGNOSIS — E03.9 ACQUIRED HYPOTHYROIDISM: ICD-10-CM

## 2025-05-05 DIAGNOSIS — Z87.891 FORMER SMOKER: ICD-10-CM

## 2025-05-05 DIAGNOSIS — C61 PROSTATE CANCER (MULTI): ICD-10-CM

## 2025-05-05 DIAGNOSIS — Z23 NEED FOR SHINGLES VACCINE: ICD-10-CM

## 2025-05-05 DIAGNOSIS — Z00.00 MEDICARE ANNUAL WELLNESS VISIT, SUBSEQUENT: Primary | ICD-10-CM

## 2025-05-05 DIAGNOSIS — Z23 NEED FOR PNEUMOCOCCAL 20-VALENT CONJUGATE VACCINATION: ICD-10-CM

## 2025-05-05 DIAGNOSIS — R09.82 POST-NASAL DRIP: ICD-10-CM

## 2025-05-05 DIAGNOSIS — I10 PRIMARY HYPERTENSION: ICD-10-CM

## 2025-05-05 DIAGNOSIS — E78.2 MIXED HYPERLIPIDEMIA: ICD-10-CM

## 2025-05-05 PROBLEM — J44.9 COPD (CHRONIC OBSTRUCTIVE PULMONARY DISEASE) (MULTI): Status: RESOLVED | Noted: 2023-04-06 | Resolved: 2025-05-05

## 2025-05-05 PROCEDURE — 1036F TOBACCO NON-USER: CPT | Performed by: FAMILY MEDICINE

## 2025-05-05 PROCEDURE — 99214 OFFICE O/P EST MOD 30 MIN: CPT | Performed by: FAMILY MEDICINE

## 2025-05-05 PROCEDURE — 99397 PER PM REEVAL EST PAT 65+ YR: CPT | Performed by: FAMILY MEDICINE

## 2025-05-05 PROCEDURE — 3078F DIAST BP <80 MM HG: CPT | Performed by: FAMILY MEDICINE

## 2025-05-05 PROCEDURE — 1157F ADVNC CARE PLAN IN RCRD: CPT | Performed by: FAMILY MEDICINE

## 2025-05-05 PROCEDURE — G0439 PPPS, SUBSEQ VISIT: HCPCS | Performed by: FAMILY MEDICINE

## 2025-05-05 PROCEDURE — 1159F MED LIST DOCD IN RCRD: CPT | Performed by: FAMILY MEDICINE

## 2025-05-05 PROCEDURE — 1160F RVW MEDS BY RX/DR IN RCRD: CPT | Performed by: FAMILY MEDICINE

## 2025-05-05 PROCEDURE — 1123F ACP DISCUSS/DSCN MKR DOCD: CPT | Performed by: FAMILY MEDICINE

## 2025-05-05 PROCEDURE — 1170F FXNL STATUS ASSESSED: CPT | Performed by: FAMILY MEDICINE

## 2025-05-05 PROCEDURE — 1158F ADVNC CARE PLAN TLK DOCD: CPT | Performed by: FAMILY MEDICINE

## 2025-05-05 PROCEDURE — 3075F SYST BP GE 130 - 139MM HG: CPT | Performed by: FAMILY MEDICINE

## 2025-05-05 RX ORDER — LEVOTHYROXINE SODIUM 112 UG/1
112 TABLET ORAL DAILY
Qty: 90 TABLET | Refills: 1 | Status: SHIPPED | OUTPATIENT
Start: 2025-05-05 | End: 2025-11-01

## 2025-05-05 RX ORDER — OLMESARTAN MEDOXOMIL 20 MG/1
20 TABLET ORAL DAILY
Qty: 90 TABLET | Refills: 1 | Status: SHIPPED | OUTPATIENT
Start: 2025-05-05 | End: 2025-11-01

## 2025-05-05 RX ORDER — PRAVASTATIN SODIUM 80 MG/1
80 TABLET ORAL DAILY
Qty: 90 TABLET | Refills: 1 | Status: SHIPPED | OUTPATIENT
Start: 2025-05-05 | End: 2025-11-01

## 2025-05-05 ASSESSMENT — ACTIVITIES OF DAILY LIVING (ADL)
MANAGING_FINANCES: INDEPENDENT
TAKING_MEDICATION: INDEPENDENT
DOING_HOUSEWORK: INDEPENDENT
BATHING: INDEPENDENT
GROCERY_SHOPPING: INDEPENDENT
DRESSING: INDEPENDENT

## 2025-05-05 ASSESSMENT — PATIENT HEALTH QUESTIONNAIRE - PHQ9
1. LITTLE INTEREST OR PLEASURE IN DOING THINGS: NOT AT ALL
1. LITTLE INTEREST OR PLEASURE IN DOING THINGS: NOT AT ALL
SUM OF ALL RESPONSES TO PHQ9 QUESTIONS 1 AND 2: 0
SUM OF ALL RESPONSES TO PHQ9 QUESTIONS 1 AND 2: 0
2. FEELING DOWN, DEPRESSED OR HOPELESS: NOT AT ALL
2. FEELING DOWN, DEPRESSED OR HOPELESS: NOT AT ALL

## 2025-05-05 ASSESSMENT — ENCOUNTER SYMPTOMS
DEPRESSION: 0
OCCASIONAL FEELINGS OF UNSTEADINESS: 0
LOSS OF SENSATION IN FEET: 0

## 2025-05-05 NOTE — PROGRESS NOTES
Chief Complaint  Reason for Visit: Sae Dan is an 74 y.o. male here for a Medicare Annual Wellness Visit Subsequent, Hypertension, Hypothyroidism, Hyperlipidemia, and Annual Exam.     Past Medical, Surgical, and Family History reviewed and updated in chart.    Reviewed all medications by prescribing practitioner or clinical pharmacist (such as prescriptions, OTCs, herbal therapies and supplements) and documented in the medical record.    History of Present Illness  1. Medicare Wellness/Physical Exam       - Immunizations: Consider Prevnar 20 and Shingrix at a local pharmacy.       - Consider abdominal aortic aneurysm (AAA) screening due to history of smoking.    2. Hypothyroidism       - History of Grave's Disease with thyroidectomy.       - Currently on levothyroxine 112 mcg daily.       - Denies symptoms such as palpitations, confusion, changes in bowel movements, or intolerance to heat or cold.       - Recently saw a neuro-ophthalmologist; double vision has resolved with increased fluid intake, and no complications were noted on examination.    3. Hypertension       - Taking olmesartan 20 mg daily.       - No symptoms reported, including headaches, dizziness, vision changes, chest pain or pressure, palpitations, shortness of breath, or leg edema.    4. Hyperlipidemia       - Agreed to have fasting blood work done today.       - Currently taking pravastatin 80 mg daily, with two tablets remaining.    5. Postnasal Drip       - Attributed to allergies, usually not problematic.       - Plans to use OTC Flonase, which has been effective in the past; not interested in prescription medication at this time.    6. Generalized Osteoarthritis       - Since last visit, underwent left shoulder repair and completed physical therapy.       - Experiencing some residual decreased range of motion and numbness, but these symptoms are improving with movement.    7. Pulmonary Nodule/COPD       - Previously managed by   Bernice in pulmonology.       - CT chest follow-up requested for October 2025, but patient declined further workup with pulmonology.    8. Chronic Kidney Disease (CKD) with Low Vitamin D       - Currently under nephrology care.       - Recent blood work shows normal GFR; vitamin D level is very low at 15.       - Started on weekly vitamin D supplementation.    9. Prostate Cancer       - Diagnosis: Intermediate risk prostate cancer, stage IIB (cT1c cN0 cMx), PSA 3.38 ng/mL, [place] grade group 2 (3+4 = 7).       - MRI findings: 1.2 cm focus in the posterior medial left peripheral zone of the apex, consistent with a PI-RADS 4 lesion, abutting the capsule. No extraprostatic extension, seminal vesicle invasion, or pelvic lymphadenopathy noted.       - Pathology: [place] grade group 2 (3+4 = 7) prostate adenocarcinoma with intraductal carcinoma identified.       - Treatment: Underwent radiation therapy. Last PSA, measured 5 months ago, was 0.23 ng/mL.    10. Ventral Hernia/Left Shoulder Surgery       - Ventral Hernia: Underwent robotic-assisted incisional hernia repair with mesh placement performed by Dr. Brooks on January 15, 2025.       - Left Shoulder Surgery: Underwent repair for rotator cuff, bicep tendinitis, impingement syndrome, and labral tear with Dr. Stratton on January 21, 2025.    Review of Systems  All pertinent positive symptoms are included in the history of present illness.    All other systems have been reviewed and are negative and noncontributory to this patient's current ailments.    Past Medical History  He has a past medical history of Abnormal liver ultrasound, Age-related nuclear cataract, left eye (06/12/2014), Age-related nuclear cataract, right eye (06/12/2014), Atherosclerotic heart disease of native coronary artery without angina pectoris (10/14/2013), Carotid stenosis, Cerebral infarction (2011), CKD (chronic kidney disease) stage 3, GFR 30-59 ml/min (Multi), COPD (chronic obstructive pulmonary  "disease) (Multi), Depression, Essential (primary) hypertension (08/15/2016), Graves disease, History of stress test (03/09/2023), Hyperlipidemia, Hypocalcemia (08/15/2016), Hypothyroidism, OA (osteoarthritis), Other mechanical strabismus (05/15/2014), Personal history of other diseases of the circulatory system (10/04/2013), Personal history of other endocrine, nutritional and metabolic disease, Prostate cancer (Multi), Pulmonary nodule, PVD (peripheral vascular disease) (CMS-HCC), and Shortness of breath.    Surgical History  He has a past surgical history that includes Other surgical history (08/09/2018); Total thyroidectomy (01/09/2015); CT angio coronary art with heartflow if score >30% (11/08/2018); MR angio head wo IV contrast (11/17/2012); MR angio neck wo IV contrast (11/17/2012); Appendectomy; Hernia repair (01/15/2025); Trigger finger release; Rotator cuff repair (Left, 01/21/2025); and Shoulder arthroscopy w/ labral repair (Left, 01/21/2025).     Social History  He reports that he quit smoking about 17 years ago. His smoking use included cigarettes and cigars. He has been exposed to tobacco smoke. He has never used smokeless tobacco. He reports current alcohol use. He reports that he does not use drugs.    Family History[1]  Medications Prior to Visit[2]  Allergies  Morphine    Immunization History   Administered Date(s) Administered    Flu vaccine, trivalent, preservative free, HIGH-DOSE, age 65y+ (Fluzone) 12/09/2014, 09/16/2024    Influenza, Seasonal, Quadrivalent, Adjuvanted 11/03/2023    Influenza, seasonal, injectable 12/21/2016, 12/21/2022    Pfizer Purple Cap SARS-CoV-2 03/08/2021, 04/05/2021    Pneumococcal, Unspecified 12/21/2016, 08/08/2018     Objective   Visit Vitals  /76   Pulse 68   Ht 1.753 m (5' 9\")   Wt 105 kg (232 lb)   BMI 34.26 kg/m²   Smoking Status Former   BSA 2.26 m²        BP Readings from Last 3 Encounters:   05/05/25 130/76   03/12/25 146/60   02/24/25 157/70      Wt " Readings from Last 3 Encounters:   05/05/25 105 kg (232 lb)   03/31/25 102 kg (225 lb)   03/12/25 104 kg (230 lb)      Vision  No results found.    Medicare Wellness Information  Patient Self Assessment of Health Status  Patient Self Assessment: Fair    Nutrition and Exercise  Current Diet: Well Balanced Diet  Adequate Fluid Intake: Yes  Caffeine: Yes  Exercise Frequency: Infrequently    Functional Ability/Level of Safety  Cognitive Impairment Observed: No cognitive impairment observed  Cognitive Impairment Reported: No cognitive impairment reported by patient or family    Home Safety Risk Factors: None    Relevant Results  No visits with results within 1 Month(s) from this visit.   Latest known visit with results is:   Orders Only on 03/18/2025   Component Date Value    IRON, TOTAL 03/18/2025 39 (L)     IRON BINDING CAPACITY 03/18/2025 303     % SATURATION 03/18/2025 13 (L)     GLUCOSE 03/18/2025 94     UREA NITROGEN (BUN) 03/18/2025 21     CREATININE 03/18/2025 1.17     EGFR 03/18/2025 65     BUN/CREATININE RATIO 03/18/2025 SEE NOTE:     SODIUM 03/18/2025 137     POTASSIUM 03/18/2025 4.2     CHLORIDE 03/18/2025 106     CARBON DIOXIDE 03/18/2025 18 (L)     CALCIUM 03/18/2025 9.0     PHOSPHATE (AS PHOSPHORUS) 03/18/2025 3.1     ALBUMIN 03/18/2025 4.1     CREATININE, RANDOM URINE 03/18/2025 78     ALBUMIN, URINE 03/18/2025 1.3     ALBUMIN/CREATININE RATIO* 03/18/2025 17     WHITE BLOOD CELL COUNT 03/18/2025 9.1     RED BLOOD CELL COUNT 03/18/2025 4.40     HEMOGLOBIN 03/18/2025 12.7 (L)     HEMATOCRIT 03/18/2025 39.8     MCV 03/18/2025 90.5     MCH 03/18/2025 28.9     MCHC 03/18/2025 31.9 (L)     RDW 03/18/2025 13.5     PLATELET COUNT 03/18/2025 231     MPV 03/18/2025 9.6     FERRITIN 03/18/2025 221     PARATHYROID HORMONE, INT* 03/18/2025 41     VITAMIN D,25-OH,TOTAL,IA 03/18/2025 15 (L)      The 10-year ASCVD risk score (Charles ROSSI, et al., 2019) is: 30%    Values used to calculate the score:      Age: 74 years       Sex: Male      Is Non- : No      Diabetic: No      Tobacco smoker: No      Systolic Blood Pressure: 130 mmHg      Is BP treated: Yes      HDL Cholesterol: 42.8 mg/dL      Total Cholesterol: 191 mg/dL    Physical Exam  CONSTITUTIONAL - well nourished, well developed, looks like stated age, in no acute distress, not ill-appearing, and not tired appearing  SKIN - normal skin color and pigmentation, normal skin turgor without rash, lesions, or nodules visualized  HEAD - no trauma, normocephalic  EYES - pupils are equal and reactive to light, extraocular muscles are intact, and normal external exam  CHEST - clear to auscultation, no wheezing, no crackles and no rales, good effort  CARDIAC - regular rate and regular rhythm, no skipped beats, no murmur  EXTREMITIES - no obvious or evident edema, no obvious or evident deformities  NEUROLOGICAL - normal gait, normal balance, normal motor, no ataxia, alert, oriented and no focal signs  PSYCHIATRIC - alert, pleasant and cordial, age-appropriate    Assessment and Plan  Problem List Items Addressed This Visit       Hyperlipidemia    Please obtain fasting blood work and we will adjust medication accordingly         Relevant Medications    pravastatin (Pravachol) 80 mg tablet    Other Relevant Orders    Lipid Panel    Hypertension    Stable, no changes to medication recommended         Relevant Medications    olmesartan (BENIcar) 20 mg tablet    Hypothyroidism    TSH was done by Dr. Renee in ophthalmology a few months ago, and noted to be euthyroid  We will continue levothyroxine at 112 mcg daily         Relevant Medications    levothyroxine (Synthroid, Levoxyl) 112 mcg tablet    Prostate cancer (Multi)    PSA stable, 5 months ago most recently checked  Continue to follow with oncology team per protocol         Medicare annual wellness visit, subsequent - Primary    Questions answered and reviewed  Colon cancer screening no longer needed  Immunizations:  Consider Shingrix and Prevnar 20         Physical exam, annual    Complete history and physical examination was performed  We will notify of test results once available         Post-nasal drip    We talked about considering a prescription Flonase, but you had suggested you would prefer to use OTC initially  I offered triamcinolone 40 mg injection today which should help with allergies throughout the next 3 to 4 months but that was declined as well          Other Visit Diagnoses         Need for shingles vaccine        Consider obtaining at the local pharmacy secondary to cost      Need for pneumococcal 20-valent conjugate vaccination        Consider obtaining at the local pharmacy secondary to cost      Former smoker        AAA screening highly recommended    Relevant Orders    Vascular US abdominal aorta anuerysm AAA screening      Encounter for abdominal aortic aneurysm (AAA) screening        Relevant Orders    Vascular US abdominal aorta anuerysm AAA screening          Patient Care Team:  Miguel Angel Hendrix DO as PCP - General (Family Medicine)  Miguel Angel Hendrix DO as PCP - [employer] PCP       [1]   Family History  Problem Relation Name Age of Onset    Cancer Mother      Cataracts Father      Cancer Father      Stroke Father      Glaucoma Other grandmother    [2]   Outpatient Medications Prior to Visit   Medication Sig Dispense Refill    aspirin 81 mg EC tablet Take 1 tablet (81 mg) by mouth once daily.      ergocalciferol (Vitamin D-2) 1250 mcg (50,000 units) capsule Take 1 capsule (50,000 Units) by mouth 1 (one) time per week. 4 capsule 11    doxycycline (Vibramycin) 100 mg capsule Take 1 capsule (100 mg) by mouth 2 times a day. Take with at least 8 ounces (large glass) of water, do not lie down for 30 minutes after 20 capsule 0    levothyroxine (Synthroid, Levoxyl) 112 mcg tablet Take 1 tablet (112 mcg) by mouth once daily. 90 tablet 1    methylPREDNISolone (Medrol Dospak) 4 mg tablets Take as  directed on package. 21 tablet 0    olmesartan (BENIcar) 20 mg tablet Take 1 tablet (20 mg) by mouth once daily. 90 tablet 1    pravastatin (Pravachol) 80 mg tablet Take 1 tablet (80 mg) by mouth once daily. as directed 90 tablet 1     No facility-administered medications prior to visit.

## 2025-05-05 NOTE — ASSESSMENT & PLAN NOTE
PSA stable, 5 months ago most recently checked  Continue to follow with oncology team per protocol

## 2025-05-05 NOTE — ASSESSMENT & PLAN NOTE
TSH was done by Dr. Renee in ophthalmology a few months ago, and noted to be euthyroid  We will continue levothyroxine at 112 mcg daily

## 2025-05-05 NOTE — ASSESSMENT & PLAN NOTE
We talked about considering a prescription Flonase, but you had suggested you would prefer to use OTC initially  I offered triamcinolone 40 mg injection today which should help with allergies throughout the next 3 to 4 months but that was declined as well

## 2025-05-05 NOTE — ASSESSMENT & PLAN NOTE
Questions answered and reviewed  Colon cancer screening no longer needed  Immunizations: Consider Shingrix and Prevnar 20

## 2025-05-06 DIAGNOSIS — E78.2 MIXED HYPERLIPIDEMIA: Primary | ICD-10-CM

## 2025-05-06 LAB
CHOLEST SERPL-MCNC: 231 MG/DL
CHOLEST/HDLC SERPL: 5.4 (CALC)
HDLC SERPL-MCNC: 43 MG/DL
LDLC SERPL CALC-MCNC: 158 MG/DL (CALC)
NONHDLC SERPL-MCNC: 188 MG/DL (CALC)
TRIGL SERPL-MCNC: 162 MG/DL

## 2025-05-06 RX ORDER — EZETIMIBE 10 MG/1
10 TABLET ORAL DAILY
Qty: 90 TABLET | Refills: 1 | Status: SHIPPED | OUTPATIENT
Start: 2025-05-06 | End: 2025-11-02

## 2025-05-06 NOTE — RESULT ENCOUNTER NOTE
Your recent cholesterol levels have increased, particularly your LDL (bad cholesterol), which has gone up by over 30 points. To address this, I will be adding Zetia (ezetimibe) 10 mg weekly to your current regimen. You are already on the maximum dose of pravastatin, and the addition of Zetia should help lower your LDL further. Zetia is generally well-tolerated and you are unlikely to experience any side effects. Please start this medication and let me know if you notice any changes or have any concerns.

## 2025-05-28 ENCOUNTER — HOSPITAL ENCOUNTER (OUTPATIENT)
Dept: VASCULAR MEDICINE | Facility: CLINIC | Age: 75
Discharge: HOME | End: 2025-05-28
Payer: MEDICARE

## 2025-05-28 DIAGNOSIS — Z87.891 FORMER SMOKER: ICD-10-CM

## 2025-05-28 DIAGNOSIS — Z13.6 ENCOUNTER FOR ABDOMINAL AORTIC ANEURYSM (AAA) SCREENING: ICD-10-CM

## 2025-05-28 PROCEDURE — 76706 US ABDL AORTA SCREEN AAA: CPT

## 2025-05-28 PROCEDURE — 76706 US ABDL AORTA SCREEN AAA: CPT | Performed by: SURGERY

## 2025-05-29 NOTE — RESULT ENCOUNTER NOTE
Thank you for having the aneurysm screening done, as according to the radiologist there is no evidence of an abdominal aortic aneurysm

## 2025-06-02 ENCOUNTER — APPOINTMENT (OUTPATIENT)
Facility: CLINIC | Age: 75
End: 2025-06-02
Payer: MEDICARE

## 2025-06-13 ENCOUNTER — APPOINTMENT (OUTPATIENT)
Dept: RADIATION ONCOLOGY | Facility: HOSPITAL | Age: 75
End: 2025-06-13
Payer: MEDICARE

## 2025-07-08 DIAGNOSIS — C61 MALIGNANT NEOPLASM OF PROSTATE (MULTI): Primary | ICD-10-CM

## 2025-07-14 ENCOUNTER — TELEPHONE (OUTPATIENT)
Dept: RADIATION ONCOLOGY | Facility: HOSPITAL | Age: 75
End: 2025-07-14
Payer: MEDICARE

## 2025-07-14 PROCEDURE — 36415 COLL VENOUS BLD VENIPUNCTURE: CPT

## 2025-07-14 PROCEDURE — 84153 ASSAY OF PSA TOTAL: CPT

## 2025-07-14 ASSESSMENT — ENCOUNTER SYMPTOMS
ARTHRALGIAS: 0
DIARRHEA: 0
SHORTNESS OF BREATH: 0
WEAKNESS: 0
DIZZINESS: 0
UNEXPECTED WEIGHT CHANGE: 0
CONSTIPATION: 0
HEMATURIA: 0
ALLERGIC/IMMUNOLOGIC NEGATIVE: 1
COUGH: 0
BACK PAIN: 0
PALPITATIONS: 0
DIFFICULTY URINATING: 0
FREQUENCY: 0
BLOOD IN STOOL: 0
FATIGUE: 0
CHEST TIGHTNESS: 0
FEVER: 0
JOINT SWELLING: 0
ABDOMINAL PAIN: 0
PSYCHIATRIC NEGATIVE: 1
RECTAL PAIN: 0
ANAL BLEEDING: 0
DYSURIA: 0

## 2025-07-14 NOTE — PROGRESS NOTES
Cancer synopsis:  Rad/onc:  Dr. Vázquez/ Mimi CAMACHO    74 year-old male with favorable intermediate risk prostate cancer, cT1c cN0 cMx, PSA 3.38 ng/mL, Favio grade group 2 (3+4 = 7), stage IIB.  The patient has a remote history of Graves' disease treated with retro-orbital radiation 2000 cGy in 10 fractions completed in 2014.  The patient had elevated PSA and MRI of the prostate done shows a 1.2 cm focus in the posterior medial left peripheral zone of the apex consistent with a PI-RADS four lesion with abutment of the capsule, no notable  extraprostatic extension, seminal vesicle invasion or pelvic lymphadenopathy.  The patient underwent transrectal ultrasound-guided biopsy of the prostate  which shows [place] grade group 2 (3+4 = 7) prostate adenocarcinoma with  intraductal carcinoma identified.  Pretreatment MALACHI score 11. IPSS 3.  Quality  of life 1 (pleased).     The radiation planning and treatment procedures were explained to the patient  in advance, and all questions were answered. The benefits and goals of  treatment, options and alternatives, limitations, side effects and risks of  radiation were also explained. The patient provided informed consent.     Technical Summary : Region(s) Treated: Prostate  Radiation Dose Prescribed:  144 Gy, minimal peripheral dose.  Radiation Technique/Machine Used: LDR prostate brachytherapy implant using  I-125. Additional radiation technical details available in our radiation  oncology [email or web address] [email or web address] and our treatment planning system.     Treatment Area #1  Location : Prostate  Dates : 5/24/2021  Number of Treatments : 1  Dose : 69222 cGy  Modality : I-125 LDR     Other issues:  HLD, HTN, stage 3a CKD, hypothydism, OA, PVD, COPD    History of presenting illness:    Patient ID: [id number]     Sae Dan is a 74 y.o. male who presents for his FIR prostate cancer GS 3+4=7, IPSA <10 now s/p RT.    RT Site: Prostate  RT Date: 05/24/2021 (PSI)  Hormone therapy: No  Hot Flushes:  Denies  Fatigue: Denies  Bone pain: Denies  MALACHI: n/a virtual  ED: Does report erectile loss with aging and RT however not concerned at this time per patient  ED medications: No  IPSS: n/a virtual  Urinary symptoms: Denies hematuria, dysuria. Admits to mild weak stream however resolved as day progresses and is manageable per patient. Denies incomplete bladder emptying, urine leakage or urgency.  Urinary Medications: No  Rectal bleeding: Denies  Colonoscopy:  03/2020: normal, next in 10yrs  Other systems: Denies SOB, CP or fever.     Review of systems:  Review of Systems   Constitutional:  Negative for fatigue, fever and unexpected weight change.   Respiratory:  Negative for cough, chest tightness and shortness of breath.    Cardiovascular:  Negative for chest pain, palpitations and leg swelling.   Gastrointestinal:  Negative for abdominal pain, anal bleeding, blood in stool, constipation, diarrhea and rectal pain.   Endocrine: Negative for cold intolerance, heat intolerance and polyuria.   Genitourinary:  Negative for decreased urine volume, difficulty urinating, dysuria, frequency, hematuria and urgency.   Musculoskeletal:  Negative for arthralgias, back pain, gait problem and joint swelling.   Skin: Negative.    Allergic/Immunologic: Negative.    Neurological:  Negative for dizziness, syncope and weakness.   Psychiatric/Behavioral: Negative.       Past Medical history  Past Medical History:   Diagnosis Date    Abnormal liver ultrasound     fatty infiltrates    Age-related nuclear cataract, left eye 06/12/2014    Age-related nuclear cataract of left eye    Age-related nuclear cataract, right eye 06/12/2014    Age-related nuclear cataract of right eye    Atherosclerotic heart disease of native coronary artery without angina pectoris 10/14/2013    Coronary artery disease    Carotid stenosis     Cerebral infarction 2011    no residual issues    CKD (chronic kidney disease) stage 3, GFR 30-59 ml/min (Multi)      12/24/24 BUN 35 Creatinine 1.46 GFR 50 - no NSAIDS    COPD (chronic obstructive pulmonary disease) (Multi)     pt denies    Depression     occasional/ SAD    Essential (primary) hypertension 08/15/2016    Benign essential hypertension    Graves disease     History of stress test 03/09/2023    Hyperlipidemia     Hypocalcemia 08/15/2016    Hypocalcemia    Hypothyroidism     OA (osteoarthritis)     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    PVD (peripheral vascular disease)     Shortness of breath     Shortness of breath on exertion - pt denies 12/31/24      Surgical/family history  Family History   Problem Relation Name Age of Onset    Cancer Mother      Cataracts Father      Cancer Father      Stroke Father      Glaucoma Other grandmother       Past Surgical History:   Procedure Laterality Date    APPENDECTOMY      CT ANGIO CORONARY ART WITH HEARTFLOW IF SCORE >30%  11/08/2018    CT HEART CORONARY ANGIOGRAM 11/8/2018 [facility]GAC[facility]    HERNIA REPAIR  01/15/2025    Ventral hernia    MR HEAD ANGIO WO IV CONTRAST  11/17/2012    MR HEAD ANGIO WO IV CONTRAST 11/17/2012 [facility]    MR NECK ANGIO WO IV CONTRAST  11/17/2012    MR NECK ANGIO WO IV CONTRAST 11/17/2012 [facility]    OTHER SURGICAL HISTORY  08/09/2018    Endarterectomy Carotid Artery    ROTATOR CUFF REPAIR Left 01/21/2025    SHOULDER ARTHROSCOPY W/ LABRAL REPAIR Left 01/21/2025    TOTAL THYROIDECTOMY  01/09/2015    Thyroid Surgery Total Thyroidectomy    TRIGGER FINGER RELEASE        Social  History  Tobacco Use: Medium Risk (5/5/2025)    Patient History     Smoking Tobacco Use: Former     Smokeless Tobacco Use: Never     Passive Exposure: Past       Current med list:  Current Outpatient Medications   Medication Instructions    aspirin 81 mg, Daily    ergocalciferol (VITAMIN D-2) 50,000 Units, oral, Once Weekly    ezetimibe (ZETIA) 10 mg, oral, Daily    levothyroxine (SYNTHROID, LEVOXYL) 112 mcg, oral, Daily    olmesartan (BENICAR) 20 mg, oral, Daily    pravastatin (PRAVACHOL) 80 mg, oral, Daily, as directed      Last recorded vital:  N/a virtual    Physical exam  N/a virtual    Pertinent labs:  Prostate Specific AG   Date/Time Value Ref Range Status   11/27/2024 01:04 PM 0.23 <=4.00 ng/mL Final     07/15/2025: 0.23 PSA  11/27/2024: 0.23 PSA    Dx:  Problem List Items Addressed This Visit    None  Visit Diagnoses         Malignant neoplasm of prostate (Multi)    -  Primary    Relevant Orders    Prostate Specific Antigen    Clinic Appointment Request Follow Up; [facility] (virtual); [facility] [facility] (virtual)    Clinic Appointment Request Follow Up; [facility]ON ROBB (virtual); [facility] [facility] (virtual) (Completed)        Psa 0.23. Review of latent SE including rectal bleeding, hematuria, urinary strictures, ED where reviewed as well as how to contact office if s/s present. Does report erectile loss with aging and RT however not concerned at this time per patient. Denies latent SE. NCCN guidelines where reviewed and routine FUV of every 3m for first year and every 6m for four years for a total of five years was discussed. Patient verbalized understanding.      PLAN:  FUV 6m virtually  Labs PSA in 6m  Imaging none  FUV other providers: PCP for routine evals    Please contact office with any concerns:  Chon Aguirreient CNP  [phone]    I performed this visit using realtime telehealth tools, including an audio/video OR telephone connection between patient’s name and location Sae Dan  and Chon Le CNP.    2. POS 10: Telehealth provided in patient's home.  o Patient is located in their home (which is a location other than a hospital or other  facility where the patient receives care in a private residence) when receiving  health services or health related services through telecommunication technology.

## 2025-07-15 ENCOUNTER — LAB (OUTPATIENT)
Dept: LAB | Facility: HOSPITAL | Age: 75
End: 2025-07-15
Payer: MEDICARE

## 2025-07-15 ENCOUNTER — HOSPITAL ENCOUNTER (OUTPATIENT)
Dept: RADIATION ONCOLOGY | Facility: HOSPITAL | Age: 75
Setting detail: RADIATION/ONCOLOGY SERIES
Discharge: HOME | End: 2025-07-15
Payer: MEDICARE

## 2025-07-15 DIAGNOSIS — C61 MALIGNANT NEOPLASM OF PROSTATE (MULTI): Primary | ICD-10-CM

## 2025-07-15 LAB — PSA SERPL-MCNC: 0.23 NG/ML

## 2025-07-15 PROCEDURE — 99213 OFFICE O/P EST LOW 20 MIN: CPT

## 2025-07-15 PROCEDURE — 99213 OFFICE O/P EST LOW 20 MIN: CPT | Mod: 95

## 2025-07-24 ENCOUNTER — APPOINTMENT (OUTPATIENT)
Dept: PRIMARY CARE | Facility: CLINIC | Age: 75
End: 2025-07-24
Payer: MEDICARE

## 2025-07-24 VITALS
SYSTOLIC BLOOD PRESSURE: 132 MMHG | HEART RATE: 73 BPM | DIASTOLIC BLOOD PRESSURE: 72 MMHG | WEIGHT: 226.8 LBS | BODY MASS INDEX: 33.49 KG/M2

## 2025-07-24 DIAGNOSIS — E78.2 MIXED HYPERLIPIDEMIA: Primary | ICD-10-CM

## 2025-07-24 DIAGNOSIS — C61 PROSTATE CANCER (MULTI): ICD-10-CM

## 2025-07-24 DIAGNOSIS — I10 PRIMARY HYPERTENSION: ICD-10-CM

## 2025-07-24 DIAGNOSIS — N18.31 STAGE 3A CHRONIC KIDNEY DISEASE (MULTI): ICD-10-CM

## 2025-07-24 DIAGNOSIS — E55.9 VITAMIN D DEFICIENCY: ICD-10-CM

## 2025-07-24 DIAGNOSIS — R91.1 PULMONARY NODULE: ICD-10-CM

## 2025-07-24 DIAGNOSIS — M15.9 GENERALIZED OSTEOARTHRITIS OF MULTIPLE SITES: ICD-10-CM

## 2025-07-24 DIAGNOSIS — R09.82 POST-NASAL DRIP: ICD-10-CM

## 2025-07-24 DIAGNOSIS — E03.9 ACQUIRED HYPOTHYROIDISM: ICD-10-CM

## 2025-07-24 RX ORDER — OLMESARTAN MEDOXOMIL 20 MG/1
20 TABLET ORAL DAILY
Qty: 90 TABLET | Refills: 1 | Status: SHIPPED | OUTPATIENT
Start: 2025-07-24 | End: 2026-01-20

## 2025-07-24 RX ORDER — LEVOTHYROXINE SODIUM 112 UG/1
112 TABLET ORAL DAILY
Qty: 90 TABLET | Refills: 1 | Status: SHIPPED | OUTPATIENT
Start: 2025-07-24 | End: 2026-01-20

## 2025-07-24 RX ORDER — EZETIMIBE 10 MG/1
10 TABLET ORAL DAILY
Qty: 90 TABLET | Refills: 1 | Status: SHIPPED | OUTPATIENT
Start: 2025-07-24 | End: 2026-01-20

## 2025-07-24 RX ORDER — PRAVASTATIN SODIUM 80 MG/1
80 TABLET ORAL DAILY
Qty: 90 TABLET | Refills: 1 | Status: SHIPPED | OUTPATIENT
Start: 2025-07-24 | End: 2026-01-20

## 2025-07-24 NOTE — PROGRESS NOTES
Chief Complaint  Reason for Visit: Sae Dan is an 74 y.o. male here for a Med Refill.          Reviewed all medications by prescribing practitioner or clinical pharmacist (such as prescriptions, OTCs, herbal therapies and supplements) and documented in the medical record.    History of Present Illness  1. Hypothyroidism       - History of Grave's Disease with thyroidectomy.       - Currently on levothyroxine 112 mcg daily.       - Denies symptoms such as palpitations, confusion, changes in bowel movements, or intolerance to heat or cold.       - Admits of taking his levothyroxine together with other medications in the morning    2. Hypertension       - Taking olmesartan 20 mg daily.       - No symptoms reported, including headaches, dizziness, vision changes, chest pain or pressure, palpitations, shortness of breath, or leg edema.      - BP today 132/72    5. Hyperlipidemia       - Agreed to have fasting blood work done .       - Currently taking pravastatin 80 mg daily, alongside Zetia which was added in May 2025 due to uptrending of LDL.  He is tolerating well, denying any muscle cramps      4. Postnasal Drip       - Attributed to allergies, usually not problematic.       - Plans to use OTC Flonase, which has been effective in the past; not interested in prescription medication at this time.    6. Generalized Osteoarthritis       - Underwent left shoulder repair and completed physical therapy.       - Experiencing some residual decreased range of motion and numbness, but these symptoms are improving with movement.    7. Pulmonary Nodule/COPD       - Previously managed by Dr. Qeuen in pulmonology.       - CT chest follow-up requested for October 2025, but patient declined further workup with pulmonology.    8. Chronic Kidney Disease (CKD) with Low Vitamin D       - Currently under nephrology care, Dr. Wilson.       - Likely secondary to chronic use of NSAIDs, which now have been discontinued     - Recent  blood work shows normal GFR; vitamin D level is very low at 15.       - Started on weekly vitamin D supplementation.    9. Prostate Cancer       - Diagnosis: Intermediate risk prostate cancer, stage IIB (cT1c cN0 cMx), PSA 3.38 ng/mL, Favio grade group 2 (3+4 = 7).       - MRI findings: 1.2 cm focus in the posterior medial left peripheral zone of the apex, consistent with a PI-RADS 4 lesion, abutting the capsule. No extraprostatic extension, seminal vesicle invasion, or pelvic lymphadenopathy noted.       - Pathology: Favio grade group 2 (3+4 = 7) prostate adenocarcinoma with intraductal carcinoma identified.       - Treatment: Underwent radiation therapy. Last PSA, measured was 0.23 ng/mL.        Review of Systems  All pertinent positive symptoms are included in the history of present illness.    All other systems have been reviewed and are negative and noncontributory to this patient's current ailments.    Past Medical History  He has a past medical history of Abnormal liver ultrasound, Age-related nuclear cataract, left eye (06/12/2014), Age-related nuclear cataract, right eye (06/12/2014), Allergic, Anxiety, Arthritis, Atherosclerotic heart disease of native coronary artery without angina pectoris (10/14/2013), Carotid stenosis, Cerebral infarction (2011), CKD (chronic kidney disease) stage 3, GFR 30-59 ml/min (Multi), COPD (chronic obstructive pulmonary disease) (Multi), Depression, Disease of thyroid gland, Essential (primary) hypertension (08/15/2016), Graves disease, History of stress test (03/09/2023), Hyperlipidemia, Hypocalcemia (08/15/2016), Hypothyroidism, OA (osteoarthritis), Other mechanical strabismus (05/15/2014), Personal history of other diseases of the circulatory system (10/04/2013), Personal history of other endocrine, nutritional and metabolic disease, Prostate cancer (Multi), Pulmonary nodule, PVD (peripheral vascular disease), Shortness of breath, Stroke (Multi), and  Varicella.    Surgical History  He has a past surgical history that includes Other surgical history (08/09/2018); Total thyroidectomy (01/09/2015); CT angio coronary art with heartflow if score >30% (11/08/2018); MR angio head wo IV contrast (11/17/2012); MR angio neck wo IV contrast (11/17/2012); Appendectomy; Hernia repair (01/15/2025); Trigger finger release; Rotator cuff repair (Left, 01/21/2025); Shoulder arthroscopy w/ labral repair (Left, 01/21/2025); Prostate surgery; and Tonsillectomy.     Social History  He reports that he quit smoking about 14 years ago. His smoking use included cigarettes and cigars. He started smoking about 66 years ago. He has a 100 pack-year smoking history. He has been exposed to tobacco smoke. He has never used smokeless tobacco. He reports current alcohol use. He reports that he does not use drugs.    Family History[1]  Medications Prior to Visit[2]  Allergies  Morphine    Immunization History   Administered Date(s) Administered    Flu vaccine, trivalent, preservative free, HIGH-DOSE, age 65y+ (Fluzone) 12/09/2014, 09/16/2024    Influenza, Seasonal, Quadrivalent, Adjuvanted 11/03/2023    Influenza, seasonal, injectable 12/21/2016, 12/21/2022    Pfizer Purple Cap SARS-CoV-2 03/08/2021, 04/05/2021    Pneumococcal, Unspecified 12/21/2016, 08/08/2018     Objective   Visit Vitals  /72 (BP Location: Left arm, Patient Position: Sitting, BP Cuff Size: Large adult)   Pulse 73   Wt 103 kg (226 lb 12.8 oz)   BMI 33.49 kg/m²   Smoking Status Former   BSA 2.24 m²        BP Readings from Last 3 Encounters:   07/24/25 132/72   05/05/25 130/76   03/12/25 146/60      Wt Readings from Last 3 Encounters:   07/24/25 103 kg (226 lb 12.8 oz)   05/05/25 105 kg (232 lb)   03/31/25 102 kg (225 lb)      Vision  No results found.    Medicare Wellness Information                      Relevant Results  Lab on 07/15/2025   Component Date Value    Prostate Specific Antige* 07/14/2025 0.23      The ASCVD  Risk score (Charles ROSSI, et al., 2019) failed to calculate for the following reasons:    Risk score cannot be calculated because patient has a medical history suggesting prior/existing ASCVD    Physical Exam  CONSTITUTIONAL - well nourished, well developed, looks like stated age, in no acute distress, not ill-appearing, and not tired appearing  SKIN - normal skin color and pigmentation, normal skin turgor without rash, lesions, or nodules visualized  HEAD - no trauma, normocephalic  EYES - pupils are equal and reactive to light, extraocular muscles are intact, and normal external exam  CHEST - clear to auscultation, no wheezing, no crackles and no rales, good effort  CARDIAC - regular rate and regular rhythm, no skipped beats, no murmur  EXTREMITIES - no obvious or evident edema, no obvious or evident deformities  NEUROLOGICAL - normal gait, normal balance, normal motor, no ataxia, alert, oriented and no focal signs  PSYCHIATRIC - alert, pleasant and cordial, age-appropriate    Assessment and Plan  Diagnoses and all orders for this visit:  Mixed hyperlipidemia  -     ezetimibe (Zetia) 10 mg tablet; Take 1 tablet (10 mg) by mouth once daily.  -     pravastatin (Pravachol) 80 mg tablet; Take 1 tablet (80 mg) by mouth once daily. as directed  -     Lipid panel; Future  -     Comprehensive metabolic panel; Future  Tolerating well, will continue same regimen.  Refill sent to local pharmacy.  Recommend following a low fat diet and exercising regularly    Acquired hypothyroidism  -     levothyroxine (Synthroid, Levoxyl) 112 mcg tablet; Take 1 tablet (112 mcg) by mouth once daily.  Discussed taking levothyroxine first thing in the morning without other medication or food    Primary hypertension  -     olmesartan (BENIcar) 20 mg tablet; Take 1 tablet (20 mg) by mouth once daily.  Stable, no changes    Post-nasal drip  Continue using Flonase as needed    Stage 3a chronic kidney disease (Multi)  Vitamin D deficiency  Kidney  function improved with discontinuing NSAIDs and increasing hydration, Encouraged to continue  Continue taking vitamin D2 50,000 unit weekly.  Continue following with nephrology as recommended    Prostate cancer (Multi)  PSA is within normal limits  Continue monitoring per urology    Pulmonary nodule  Asymptomatic  Reviewed last chest CT from October 2024 : stable pulmonary nodules as described above measuring up to 5 mm,  likely benign. Consider continued annual screening for lung cancer  with low-dose chest CT if the patient meets USPSTF criteria (age  50-80, 20+ pack-year history, current smoker or quit in past 15  years, no health problem that substantially limits life expectancy)    Generalized osteoarthritis of multiple sites  Continue refraining from NSAIDs and use Tylenol as needed    Thank you for letting us be a part of your care team.  Please call the office if you have further questions or concerns regarding your care    Otherwise, please follow-up in 6 months for continued care and refills     Rebeca Carlos MD  PGY3,  Resident  07/24/25           [1]   Family History  Problem Relation Name Age of Onset    Cancer Mother      Cataracts Father      Cancer Father      Stroke Father      Glaucoma Other grandmother     Breast cancer Mother Jacquelin     Glaucoma Father Truman    [2]   Outpatient Medications Prior to Visit   Medication Sig Dispense Refill    aspirin 81 mg EC tablet Take 1 tablet (81 mg) by mouth once daily.      ergocalciferol (Vitamin D-2) 1250 mcg (50,000 units) capsule Take 1 capsule (50,000 Units) by mouth 1 (one) time per week. 4 capsule 11    ezetimibe (Zetia) 10 mg tablet Take 1 tablet (10 mg) by mouth once daily. 90 tablet 1    levothyroxine (Synthroid, Levoxyl) 112 mcg tablet Take 1 tablet (112 mcg) by mouth once daily. 90 tablet 1    olmesartan (BENIcar) 20 mg tablet Take 1 tablet (20 mg) by mouth once daily. 90 tablet 1    pravastatin (Pravachol) 80 mg tablet Take 1 tablet (80 mg) by  mouth once daily. as directed 90 tablet 1     No facility-administered medications prior to visit.

## 2025-07-30 LAB
ALBUMIN SERPL-MCNC: 4.4 G/DL (ref 3.6–5.1)
ALP SERPL-CCNC: 77 U/L (ref 35–144)
ALT SERPL-CCNC: 14 U/L (ref 9–46)
ANION GAP SERPL CALCULATED.4IONS-SCNC: 11 MMOL/L (CALC) (ref 7–17)
AST SERPL-CCNC: 19 U/L (ref 10–35)
BILIRUB SERPL-MCNC: 0.5 MG/DL (ref 0.2–1.2)
BUN SERPL-MCNC: 27 MG/DL (ref 7–25)
CALCIUM SERPL-MCNC: 8.8 MG/DL (ref 8.6–10.3)
CHLORIDE SERPL-SCNC: 109 MMOL/L (ref 98–110)
CHOLEST SERPL-MCNC: 152 MG/DL
CHOLEST/HDLC SERPL: 3.6 (CALC)
CO2 SERPL-SCNC: 20 MMOL/L (ref 20–32)
CREAT SERPL-MCNC: 1.36 MG/DL (ref 0.7–1.28)
EGFRCR SERPLBLD CKD-EPI 2021: 54 ML/MIN/1.73M2
GLUCOSE SERPL-MCNC: 97 MG/DL (ref 65–99)
HDLC SERPL-MCNC: 42 MG/DL
LDLC SERPL CALC-MCNC: 87 MG/DL (CALC)
NONHDLC SERPL-MCNC: 110 MG/DL (CALC)
POTASSIUM SERPL-SCNC: 5 MMOL/L (ref 3.5–5.3)
PROT SERPL-MCNC: 7 G/DL (ref 6.1–8.1)
SODIUM SERPL-SCNC: 140 MMOL/L (ref 135–146)
TRIGL SERPL-MCNC: 135 MG/DL

## 2025-08-15 ENCOUNTER — TELEPHONE (OUTPATIENT)
Dept: NEPHROLOGY | Facility: CLINIC | Age: 75
End: 2025-08-15
Payer: MEDICARE

## 2025-08-18 ENCOUNTER — APPOINTMENT (OUTPATIENT)
Dept: PRIMARY CARE | Facility: CLINIC | Age: 75
End: 2025-08-18
Payer: MEDICARE

## 2025-08-18 VITALS
HEART RATE: 83 BPM | BODY MASS INDEX: 32.34 KG/M2 | OXYGEN SATURATION: 96 % | DIASTOLIC BLOOD PRESSURE: 70 MMHG | SYSTOLIC BLOOD PRESSURE: 146 MMHG | WEIGHT: 219 LBS

## 2025-08-18 DIAGNOSIS — N18.31 STAGE 3A CHRONIC KIDNEY DISEASE (MULTI): ICD-10-CM

## 2025-08-18 DIAGNOSIS — R31.0 GROSS HEMATURIA: Primary | ICD-10-CM

## 2025-08-18 DIAGNOSIS — C61 MALIGNANT NEOPLASM OF PROSTATE (MULTI): ICD-10-CM

## 2025-08-18 DIAGNOSIS — C61 PROSTATE CANCER (MULTI): ICD-10-CM

## 2025-08-18 LAB
POC APPEARANCE, URINE: ABNORMAL
POC BILIRUBIN, URINE: NEGATIVE
POC BLOOD, URINE: ABNORMAL
POC COLOR, URINE: ABNORMAL
POC GLUCOSE, URINE: NEGATIVE MG/DL
POC KETONES, URINE: NEGATIVE MG/DL
POC LEUKOCYTES, URINE: NEGATIVE
POC NITRITE,URINE: NEGATIVE
POC PH, URINE: 6.5 PH
POC PROTEIN, URINE: ABNORMAL MG/DL
POC SPECIFIC GRAVITY, URINE: 1.01
POC UROBILINOGEN, URINE: 0.2 EU/DL

## 2025-08-18 PROCEDURE — 81002 URINALYSIS NONAUTO W/O SCOPE: CPT | Performed by: STUDENT IN AN ORGANIZED HEALTH CARE EDUCATION/TRAINING PROGRAM

## 2025-08-18 PROCEDURE — 99214 OFFICE O/P EST MOD 30 MIN: CPT | Performed by: STUDENT IN AN ORGANIZED HEALTH CARE EDUCATION/TRAINING PROGRAM

## 2025-08-18 PROCEDURE — 3077F SYST BP >= 140 MM HG: CPT | Performed by: STUDENT IN AN ORGANIZED HEALTH CARE EDUCATION/TRAINING PROGRAM

## 2025-08-18 PROCEDURE — 1159F MED LIST DOCD IN RCRD: CPT | Performed by: STUDENT IN AN ORGANIZED HEALTH CARE EDUCATION/TRAINING PROGRAM

## 2025-08-18 PROCEDURE — 3078F DIAST BP <80 MM HG: CPT | Performed by: STUDENT IN AN ORGANIZED HEALTH CARE EDUCATION/TRAINING PROGRAM

## 2025-08-18 PROCEDURE — 1160F RVW MEDS BY RX/DR IN RCRD: CPT | Performed by: STUDENT IN AN ORGANIZED HEALTH CARE EDUCATION/TRAINING PROGRAM

## 2025-08-18 ASSESSMENT — PATIENT HEALTH QUESTIONNAIRE - PHQ9
2. FEELING DOWN, DEPRESSED OR HOPELESS: NOT AT ALL
1. LITTLE INTEREST OR PLEASURE IN DOING THINGS: NOT AT ALL
SUM OF ALL RESPONSES TO PHQ9 QUESTIONS 1 AND 2: 0

## 2025-08-19 ENCOUNTER — HOSPITAL ENCOUNTER (EMERGENCY)
Facility: HOSPITAL | Age: 75
Discharge: LEFT BEFORE DISCHARGE | End: 2025-08-19
Payer: MEDICARE

## 2025-08-19 VITALS
DIASTOLIC BLOOD PRESSURE: 61 MMHG | OXYGEN SATURATION: 95 % | WEIGHT: 215 LBS | HEART RATE: 80 BPM | RESPIRATION RATE: 16 BRPM | BODY MASS INDEX: 31.84 KG/M2 | HEIGHT: 69 IN | SYSTOLIC BLOOD PRESSURE: 145 MMHG | TEMPERATURE: 97.5 F

## 2025-08-19 DIAGNOSIS — R31.9 HEMATURIA, UNSPECIFIED TYPE: Primary | ICD-10-CM

## 2025-08-19 LAB
ALBUMIN SERPL BCP-MCNC: 4.3 G/DL (ref 3.4–5)
ALP SERPL-CCNC: 79 U/L (ref 33–136)
ALT SERPL W P-5'-P-CCNC: 15 U/L (ref 10–52)
ANION GAP SERPL CALC-SCNC: 11 MMOL/L (ref 10–20)
ANION GAP SERPL CALCULATED.4IONS-SCNC: 11 MMOL/L (CALC) (ref 7–17)
APPEARANCE UR: ABNORMAL
APTT PPP: 31 SECONDS (ref 26–36)
AST SERPL W P-5'-P-CCNC: 19 U/L (ref 9–39)
BACTERIA #/AREA URNS AUTO: ABNORMAL /HPF
BASOPHILS # BLD AUTO: 0.04 X10*3/UL (ref 0–0.1)
BASOPHILS # BLD AUTO: 31 CELLS/UL (ref 0–200)
BASOPHILS NFR BLD AUTO: 0.5 %
BASOPHILS NFR BLD AUTO: 0.6 %
BILIRUB SERPL-MCNC: 0.4 MG/DL (ref 0–1.2)
BILIRUB UR STRIP.AUTO-MCNC: NEGATIVE MG/DL
BUN SERPL-MCNC: 24 MG/DL (ref 6–23)
BUN SERPL-MCNC: 27 MG/DL (ref 7–25)
BUN/CREAT SERPL: 19 (CALC) (ref 6–22)
CALCIUM SERPL-MCNC: 8.7 MG/DL (ref 8.6–10.3)
CALCIUM SERPL-MCNC: 9.1 MG/DL (ref 8.6–10.3)
CHLORIDE SERPL-SCNC: 108 MMOL/L (ref 98–110)
CHLORIDE SERPL-SCNC: 111 MMOL/L (ref 98–107)
CO2 SERPL-SCNC: 20 MMOL/L (ref 20–32)
CO2 SERPL-SCNC: 22 MMOL/L (ref 21–32)
COLOR UR: ABNORMAL
CREAT SERPL-MCNC: 1.28 MG/DL (ref 0.5–1.3)
CREAT SERPL-MCNC: 1.4 MG/DL (ref 0.7–1.28)
EGFRCR SERPLBLD CKD-EPI 2021: 52 ML/MIN/1.73M2
EGFRCR SERPLBLD CKD-EPI 2021: 58 ML/MIN/1.73M*2
EOSINOPHIL # BLD AUTO: 0.18 X10*3/UL (ref 0–0.4)
EOSINOPHIL # BLD AUTO: 217 CELLS/UL (ref 15–500)
EOSINOPHIL NFR BLD AUTO: 2.5 %
EOSINOPHIL NFR BLD AUTO: 3.5 %
ERYTHROCYTE [DISTWIDTH] IN BLOOD BY AUTOMATED COUNT: 13.2 % (ref 11.5–14.5)
ERYTHROCYTE [DISTWIDTH] IN BLOOD BY AUTOMATED COUNT: 13.6 % (ref 11–15)
GLUCOSE SERPL-MCNC: 78 MG/DL (ref 74–99)
GLUCOSE SERPL-MCNC: 97 MG/DL (ref 65–99)
GLUCOSE UR STRIP.AUTO-MCNC: NORMAL MG/DL
HCT VFR BLD AUTO: 38.1 % (ref 38.5–50)
HCT VFR BLD AUTO: 39.1 % (ref 41–52)
HGB BLD-MCNC: 12.2 G/DL (ref 13.2–17.1)
HGB BLD-MCNC: 12.6 G/DL (ref 13.5–17.5)
IMM GRANULOCYTES # BLD AUTO: 0.05 X10*3/UL (ref 0–0.5)
IMM GRANULOCYTES NFR BLD AUTO: 0.7 % (ref 0–0.9)
INR PPP: 1.1 (ref 0.9–1.1)
KETONES UR STRIP.AUTO-MCNC: NEGATIVE MG/DL
LEUKOCYTE ESTERASE UR QL STRIP.AUTO: NEGATIVE
LYMPHOCYTES # BLD AUTO: 1.88 X10*3/UL (ref 0.8–3)
LYMPHOCYTES # BLD AUTO: 1649 CELLS/UL (ref 850–3900)
LYMPHOCYTES NFR BLD AUTO: 26.4 %
LYMPHOCYTES NFR BLD AUTO: 26.6 %
MCH RBC QN AUTO: 29.4 PG (ref 26–34)
MCH RBC QN AUTO: 29.9 PG (ref 27–33)
MCHC RBC AUTO-ENTMCNC: 32 G/DL (ref 32–36)
MCHC RBC AUTO-ENTMCNC: 32.2 G/DL (ref 32–36)
MCV RBC AUTO: 91 FL (ref 80–100)
MCV RBC AUTO: 93.4 FL (ref 80–100)
MONOCYTES # BLD AUTO: 0.81 X10*3/UL (ref 0.05–0.8)
MONOCYTES # BLD AUTO: 608 CELLS/UL (ref 200–950)
MONOCYTES NFR BLD AUTO: 11.4 %
MONOCYTES NFR BLD AUTO: 9.8 %
MUCOUS THREADS #/AREA URNS AUTO: ABNORMAL /LPF
NEUTROPHILS # BLD AUTO: 3695 CELLS/UL (ref 1500–7800)
NEUTROPHILS # BLD AUTO: 4.17 X10*3/UL (ref 1.6–5.5)
NEUTROPHILS NFR BLD AUTO: 58.4 %
NEUTROPHILS NFR BLD AUTO: 59.6 %
NITRITE UR QL STRIP.AUTO: NEGATIVE
NRBC BLD-RTO: 0 /100 WBCS (ref 0–0)
PH UR STRIP.AUTO: 5.5 [PH]
PLATELET # BLD AUTO: 199 THOUSAND/UL (ref 140–400)
PLATELET # BLD AUTO: 223 X10*3/UL (ref 150–450)
PMV BLD REES-ECKER: 10.1 FL (ref 7.5–12.5)
POTASSIUM SERPL-SCNC: 4.7 MMOL/L (ref 3.5–5.3)
POTASSIUM SERPL-SCNC: 4.8 MMOL/L (ref 3.5–5.3)
PROT SERPL-MCNC: 7.9 G/DL (ref 6.4–8.2)
PROT UR STRIP.AUTO-MCNC: ABNORMAL MG/DL
PROTHROMBIN TIME: 12.5 SECONDS (ref 9.8–12.4)
RBC # BLD AUTO: 4.08 MILLION/UL (ref 4.2–5.8)
RBC # BLD AUTO: 4.29 X10*6/UL (ref 4.5–5.9)
RBC # UR STRIP.AUTO: ABNORMAL MG/DL
RBC #/AREA URNS AUTO: >20 /HPF
SODIUM SERPL-SCNC: 139 MMOL/L (ref 135–146)
SODIUM SERPL-SCNC: 139 MMOL/L (ref 136–145)
SP GR UR STRIP.AUTO: 1.01
UROBILINOGEN UR STRIP.AUTO-MCNC: NORMAL MG/DL
WBC # BLD AUTO: 6.2 THOUSAND/UL (ref 3.8–10.8)
WBC # BLD AUTO: 7.1 X10*3/UL (ref 4.4–11.3)
WBC #/AREA URNS AUTO: ABNORMAL /HPF

## 2025-08-19 PROCEDURE — 85730 THROMBOPLASTIN TIME PARTIAL: CPT

## 2025-08-19 PROCEDURE — 81003 URINALYSIS AUTO W/O SCOPE: CPT

## 2025-08-19 PROCEDURE — 80053 COMPREHEN METABOLIC PANEL: CPT

## 2025-08-19 PROCEDURE — 36415 COLL VENOUS BLD VENIPUNCTURE: CPT

## 2025-08-19 PROCEDURE — 99283 EMERGENCY DEPT VISIT LOW MDM: CPT

## 2025-08-19 PROCEDURE — 85025 COMPLETE CBC W/AUTO DIFF WBC: CPT

## 2025-08-19 ASSESSMENT — PAIN SCALES - GENERAL: PAINLEVEL_OUTOF10: 0 - NO PAIN

## 2025-08-19 ASSESSMENT — PAIN - FUNCTIONAL ASSESSMENT: PAIN_FUNCTIONAL_ASSESSMENT: 0-10

## 2025-08-20 LAB — HOLD SPECIMEN: NORMAL

## 2025-08-21 LAB
BACTERIA UR CULT: NORMAL
WBC #/AREA URNS HPF: NORMAL /HPF

## 2025-08-26 ENCOUNTER — APPOINTMENT (OUTPATIENT)
Dept: UROLOGY | Facility: CLINIC | Age: 75
End: 2025-08-26
Payer: MEDICARE

## 2025-08-26 DIAGNOSIS — R31.9 HEMATURIA, UNSPECIFIED TYPE: ICD-10-CM

## 2025-08-26 DIAGNOSIS — C61 PROSTATE CANCER (MULTI): ICD-10-CM

## 2025-08-26 DIAGNOSIS — R31.0 GROSS HEMATURIA: Primary | ICD-10-CM

## 2025-08-26 LAB
POC APPEARANCE, URINE: CLEAR
POC BILIRUBIN, URINE: NEGATIVE
POC BLOOD, URINE: ABNORMAL
POC COLOR, URINE: YELLOW
POC GLUCOSE, URINE: NEGATIVE MG/DL
POC KETONES, URINE: NEGATIVE MG/DL
POC LEUKOCYTES, URINE: NEGATIVE
POC NITRITE,URINE: NEGATIVE
POC PH, URINE: 5.5 PH
POC PROTEIN, URINE: NEGATIVE MG/DL
POC SPECIFIC GRAVITY, URINE: 1.02
POC UROBILINOGEN, URINE: 0.2 EU/DL

## 2025-08-26 PROCEDURE — 99203 OFFICE O/P NEW LOW 30 MIN: CPT | Performed by: NURSE PRACTITIONER

## 2025-08-26 PROCEDURE — 81003 URINALYSIS AUTO W/O SCOPE: CPT | Performed by: NURSE PRACTITIONER

## 2025-08-26 PROCEDURE — 88112 CYTOPATH CELL ENHANCE TECH: CPT

## 2025-08-27 LAB
APPEARANCE UR: CLEAR
BACTERIA #/AREA URNS HPF: ABNORMAL /HPF
BILIRUB UR QL STRIP: NEGATIVE
COLOR UR: YELLOW
GLUCOSE UR QL STRIP: NEGATIVE
HGB UR QL STRIP: NEGATIVE
HYALINE CASTS #/AREA URNS LPF: ABNORMAL /LPF
KETONES UR QL STRIP: NEGATIVE
LEUKOCYTE ESTERASE UR QL STRIP: ABNORMAL
NITRITE UR QL STRIP: NEGATIVE
PH UR STRIP: 5.5 [PH] (ref 5–8)
PROT UR QL STRIP: NEGATIVE
RBC #/AREA URNS HPF: ABNORMAL /HPF
SERVICE CMNT-IMP: ABNORMAL
SP GR UR STRIP: 1.02 (ref 1–1.03)
SQUAMOUS #/AREA URNS HPF: ABNORMAL /HPF
WBC #/AREA URNS HPF: ABNORMAL /HPF

## 2025-08-28 ENCOUNTER — APPOINTMENT (OUTPATIENT)
Dept: UROLOGY | Facility: CLINIC | Age: 75
End: 2025-08-28
Payer: MEDICARE

## 2025-08-28 LAB
LABORATORY COMMENT REPORT: NORMAL
LABORATORY COMMENT REPORT: NORMAL
PATH REPORT.FINAL DX SPEC: NORMAL
PATH REPORT.GROSS SPEC: NORMAL
PATH REPORT.RELEVANT HX SPEC: NORMAL
PATH REPORT.TOTAL CANCER: NORMAL

## 2025-09-05 LAB
BACTERIA #/AREA URNS HPF: NORMAL /HPF
BACTERIA UR CULT: NORMAL
HYALINE CASTS #/AREA URNS LPF: NORMAL /LPF
RBC #/AREA URNS HPF: NORMAL /HPF
SERVICE CMNT-IMP: NORMAL
SQUAMOUS #/AREA URNS HPF: NORMAL /HPF
WBC #/AREA URNS HPF: NORMAL /HPF

## 2025-09-19 ENCOUNTER — APPOINTMENT (OUTPATIENT)
Dept: UROLOGY | Facility: CLINIC | Age: 75
End: 2025-09-19
Payer: MEDICARE

## 2026-01-09 ENCOUNTER — APPOINTMENT (OUTPATIENT)
Dept: VASCULAR SURGERY | Facility: HOSPITAL | Age: 76
End: 2026-01-09
Payer: MEDICARE

## 2026-03-02 ENCOUNTER — APPOINTMENT (OUTPATIENT)
Dept: NEPHROLOGY | Facility: CLINIC | Age: 76
End: 2026-03-02
Payer: MEDICARE

## (undated) DEVICE — CANNULA, CLEAR, THREADED, 8.5 X 55MM

## (undated) DEVICE — REAMER, ACORN, 8MM

## (undated) DEVICE — GLOVE, SURGICAL, PROTEXIS PI BLUE W/NEUTHERA, 8.0, PF, LF

## (undated) DEVICE — BLADE, SHAVER 4.5 INCISOR + PLATINUM

## (undated) DEVICE — GLOVE, SURGICAL, PROTEXIS PI ORTHO, 7.0, PF, LF

## (undated) DEVICE — SUTURE, MONOCRYL, 4-0, 18 IN, PS2, UNDYED

## (undated) DEVICE — BINDER, ABDOMINAL, MEDIUM/LARGE, 12 X 45-62 IN

## (undated) DEVICE — DRIVER, NEEDLE, MEGA SUTURE CUT, DAVINCI XI

## (undated) DEVICE — APPLICATOR, CHLORAPREP, W/ORANGE TINT, 26ML

## (undated) DEVICE — ELECTRODE, ELECTROSURGICAL, BLADE, EXTENDED

## (undated) DEVICE — ADHESIVE, SKIN, DERMABOND ADVANCED, 15CM, PEN-STYLE

## (undated) DEVICE — UC T-MAX

## (undated) DEVICE — SCISSORS, MONOPOLAR, CURVED, 8MM

## (undated) DEVICE — COVER HANDLE LIGHT, STERIS, BLUE, STERILE

## (undated) DEVICE — SOLUTION, IRRIGATION, USP, SODIUM CHLORIDE 0.9%, 3000 ML

## (undated) DEVICE — SEAL, UNIVERSAL, 5-12MM

## (undated) DEVICE — GLOVE, PROTEXIS PI CLASSIC, SZ-7.5, PF, LF

## (undated) DEVICE — SUTURE, V-LOC PBT, GS-21, 12 IN, BLUE, NONABS

## (undated) DEVICE — SUTURE, VICRYL PLUS 3-0, SH, 27IN

## (undated) DEVICE — ELECTRODE, SUCTION, VAPR TRIPOLAR 90

## (undated) DEVICE — STRIP, SKIN CLOSURE, STERI STRIP, REINFORCED, 0.5 X 4 IN

## (undated) DEVICE — SUTURE, STRATAFIX, SPIRAL PDS PLUS, 0, 12IN, 30CM, CT-1, VIOLET

## (undated) DEVICE — MASK, FACE, TENET, FOAM POSITIONING, DISPOSABLE

## (undated) DEVICE — TROCAR SYSTEM, BALLOON, KII GELPORT, 12 X 100MM

## (undated) DEVICE — Device

## (undated) DEVICE — KIT, BEACH CHAIR TRIMANO

## (undated) DEVICE — PAD, GROUNDING, ELECTROSURGICAL, W/9 FT CABLE, POLYHESIVE II, ADULT, LF

## (undated) DEVICE — SUTURE, VICRYL, 0, 27 IN, UR-6, VIOLET

## (undated) DEVICE — DRAPE, SHEET, U, W/ADHESIVE STRIP, IMPERVIOUS, 60 X 70 IN, DISPOSABLE, LF, STERILE

## (undated) DEVICE — DRAPE, SHEET, U, STERI DRAPE, 47 X 51 IN, DISPOSABLE, STERILE

## (undated) DEVICE — KIT, TENODESIS, DISP

## (undated) DEVICE — FORCEPS, BIPOLAR FENESTRATED XI

## (undated) DEVICE — SUTURE SYSTEM, EXPRESSEW III NEEDLES

## (undated) DEVICE — SUTURE, STRATAFIX, 3-0 SPIRAL PDS PLUS, 15CM SH VIOLET

## (undated) DEVICE — OBTURATOR, BLADELESS , SU

## (undated) DEVICE — DRAPE, COLUMN, DAVINCI XI

## (undated) DEVICE — CARE KIT, LAPAROSCOPIC, ADVANCED

## (undated) DEVICE — TUBING, OUTFLOW, DRAIN PIPE, W/ONE WAY VALVE (FMS VUE)

## (undated) DEVICE — GOWN, ASTOUND, XL

## (undated) DEVICE — DRAPE, ARM XI

## (undated) DEVICE — DRAPE, SHEET, 17 X 23 IN

## (undated) DEVICE — CANNULA, CLEAR, THREADED, 8.5 X 75MM

## (undated) DEVICE — TUBE SET, PNEUMOLAR HEATED, SMOKE EVACU, HIGH-FLOW

## (undated) DEVICE — PROTECTOR, NERVE, ULNAR, PINK

## (undated) DEVICE — LUBRICANT, ELECTROLUBE, F/ELECTRODE TIPS

## (undated) DEVICE — DRESSING, GAUZE, SPONGE, VERSALON, ALL PURPOSE, 4 X 4 IN, SOFT

## (undated) DEVICE — COVER, TIP HOT SHEARS ENDOWRIST